# Patient Record
Sex: MALE | Race: WHITE | NOT HISPANIC OR LATINO | URBAN - METROPOLITAN AREA
[De-identification: names, ages, dates, MRNs, and addresses within clinical notes are randomized per-mention and may not be internally consistent; named-entity substitution may affect disease eponyms.]

---

## 2017-03-09 ENCOUNTER — OFFICE (AMBULATORY)
Dept: URBAN - METROPOLITAN AREA CLINIC 64 | Facility: CLINIC | Age: 62
End: 2017-03-09

## 2017-03-09 ENCOUNTER — ON CAMPUS - OUTPATIENT (AMBULATORY)
Dept: URBAN - METROPOLITAN AREA HOSPITAL 2 | Facility: HOSPITAL | Age: 62
End: 2017-03-09

## 2017-03-09 VITALS
OXYGEN SATURATION: 94 % | SYSTOLIC BLOOD PRESSURE: 108 MMHG | SYSTOLIC BLOOD PRESSURE: 115 MMHG | SYSTOLIC BLOOD PRESSURE: 131 MMHG | SYSTOLIC BLOOD PRESSURE: 113 MMHG | HEART RATE: 58 BPM | HEART RATE: 56 BPM | RESPIRATION RATE: 15 BRPM | HEART RATE: 64 BPM | DIASTOLIC BLOOD PRESSURE: 80 MMHG | TEMPERATURE: 97.2 F | SYSTOLIC BLOOD PRESSURE: 110 MMHG | DIASTOLIC BLOOD PRESSURE: 89 MMHG | DIASTOLIC BLOOD PRESSURE: 72 MMHG | DIASTOLIC BLOOD PRESSURE: 68 MMHG | RESPIRATION RATE: 16 BRPM | WEIGHT: 190 LBS | HEART RATE: 52 BPM | HEART RATE: 51 BPM | OXYGEN SATURATION: 96 % | HEART RATE: 63 BPM | OXYGEN SATURATION: 98 % | SYSTOLIC BLOOD PRESSURE: 150 MMHG | DIASTOLIC BLOOD PRESSURE: 88 MMHG | DIASTOLIC BLOOD PRESSURE: 84 MMHG | DIASTOLIC BLOOD PRESSURE: 53 MMHG | SYSTOLIC BLOOD PRESSURE: 140 MMHG | RESPIRATION RATE: 18 BRPM | OXYGEN SATURATION: 99 %

## 2017-03-09 DIAGNOSIS — Z86.010 PERSONAL HISTORY OF COLONIC POLYPS: ICD-10-CM

## 2017-03-09 DIAGNOSIS — K62.1 RECTAL POLYP: ICD-10-CM

## 2017-03-09 DIAGNOSIS — K57.30 DIVERTICULOSIS OF LARGE INTESTINE WITHOUT PERFORATION OR ABS: ICD-10-CM

## 2017-03-09 DIAGNOSIS — D12.3 BENIGN NEOPLASM OF TRANSVERSE COLON: ICD-10-CM

## 2017-03-09 DIAGNOSIS — K62.89 OTHER SPECIFIED DISEASES OF ANUS AND RECTUM: ICD-10-CM

## 2017-03-09 DIAGNOSIS — K64.8 OTHER HEMORRHOIDS: ICD-10-CM

## 2017-03-09 LAB
GI HISTOLOGY: A. UNSPECIFIED: (no result)
GI HISTOLOGY: B. UNSPECIFIED: (no result)
GI HISTOLOGY: PDF REPORT: (no result)

## 2017-03-09 PROCEDURE — 88305 TISSUE EXAM BY PATHOLOGIST: CPT | Performed by: INTERNAL MEDICINE

## 2017-03-09 PROCEDURE — 45385 COLONOSCOPY W/LESION REMOVAL: CPT | Mod: 33 | Performed by: INTERNAL MEDICINE

## 2017-03-09 RX ADMIN — PROPOFOL: 10 INJECTION, EMULSION INTRAVENOUS at 09:15

## 2017-11-02 ENCOUNTER — HOSPITAL ENCOUNTER (OUTPATIENT)
Dept: FAMILY MEDICINE CLINIC | Facility: CLINIC | Age: 62
Setting detail: SPECIMEN
Discharge: HOME | End: 2017-11-02
Attending: HOSPITALIST | Admitting: HOSPITALIST

## 2017-11-02 LAB
ALBUMIN SERPL-MCNC: 3.9 G/DL (ref 3.5–4.8)
ALBUMIN/GLOB SERPL: 1.4 {RATIO} (ref 1–1.7)
ALP SERPL-CCNC: 58 IU/L (ref 32–91)
ALT SERPL-CCNC: 27 IU/L (ref 17–63)
ANION GAP SERPL CALC-SCNC: 13.1 MMOL/L (ref 10–20)
AST SERPL-CCNC: 26 IU/L (ref 15–41)
BASOPHILS # BLD AUTO: 0 10*3/UL (ref 0–0.2)
BASOPHILS NFR BLD AUTO: 1 % (ref 0–2)
BILIRUB SERPL-MCNC: 0.8 MG/DL (ref 0.3–1.2)
BILIRUB UR QL STRIP: NEGATIVE MG/DL
BUN SERPL-MCNC: 13 MG/DL (ref 8–20)
BUN/CREAT SERPL: 10 (ref 6.2–20.3)
CALCIUM SERPL-MCNC: 9.2 MG/DL (ref 8.9–10.3)
CASTS URNS QL MICRO: NORMAL /[LPF]
CHLORIDE SERPL-SCNC: 102 MMOL/L (ref 101–111)
CHOLEST SERPL-MCNC: 201 MG/DL
CHOLEST/HDLC SERPL: 4.1 {RATIO}
COLOR UR: YELLOW
CONV BACTERIA IN URINE MICRO: NEGATIVE
CONV CLARITY OF URINE: CLEAR
CONV CO2: 26 MMOL/L (ref 22–32)
CONV HYALINE CASTS IN URINE MICRO: 0 /[LPF] (ref 0–5)
CONV LDL CHOLESTEROL DIRECT: 129 MG/DL (ref 0–100)
CONV PROTEIN IN URINE BY AUTOMATED TEST STRIP: NEGATIVE MG/DL
CONV SMALL ROUND CELLS: NORMAL /[HPF]
CONV TOTAL PROTEIN: 6.6 G/DL (ref 6.1–7.9)
CONV UROBILINOGEN IN URINE BY AUTOMATED TEST STRIP: 0.2 MG/DL
CREAT UR-MCNC: 1.3 MG/DL (ref 0.7–1.2)
CULTURE INDICATED?: NORMAL
DIFFERENTIAL METHOD BLD: (no result)
EOSINOPHIL # BLD AUTO: 0.1 10*3/UL (ref 0–0.3)
EOSINOPHIL # BLD AUTO: 2 % (ref 0–3)
ERYTHROCYTE [DISTWIDTH] IN BLOOD BY AUTOMATED COUNT: 13.5 % (ref 11.5–14.5)
FOLATE SERPL-MCNC: 12.9 NG/ML (ref 5.9–24.8)
GLOBULIN UR ELPH-MCNC: 2.7 G/DL (ref 2.5–3.8)
GLUCOSE SERPL-MCNC: 99 MG/DL (ref 65–99)
GLUCOSE UR QL: NEGATIVE MG/DL
HCT VFR BLD AUTO: 45.9 % (ref 40–54)
HDLC SERPL-MCNC: 49 MG/DL
HGB BLD-MCNC: 15.6 G/DL (ref 14–18)
HGB UR QL STRIP: NEGATIVE
KETONES UR QL STRIP: NEGATIVE MG/DL
LDLC/HDLC SERPL: 2.6 {RATIO}
LEUKOCYTE ESTERASE UR QL STRIP: NEGATIVE
LIPID INTERPRETATION: ABNORMAL
LYMPHOCYTES # BLD AUTO: 2 10*3/UL (ref 0.8–4.8)
LYMPHOCYTES NFR BLD AUTO: 37 % (ref 18–42)
MCH RBC QN AUTO: 30.2 PG (ref 26–32)
MCHC RBC AUTO-ENTMCNC: 34 G/DL (ref 32–36)
MCV RBC AUTO: 88.6 FL (ref 80–94)
MONOCYTES # BLD AUTO: 0.4 10*3/UL (ref 0.1–1.3)
MONOCYTES NFR BLD AUTO: 8 % (ref 2–11)
NEUTROPHILS # BLD AUTO: 2.9 10*3/UL (ref 2.3–8.6)
NEUTROPHILS NFR BLD AUTO: 52 % (ref 50–75)
NITRITE UR QL STRIP: NEGATIVE
NRBC BLD AUTO-RTO: 0 /100{WBCS}
NRBC/RBC NFR BLD MANUAL: 0 10*3/UL
PH UR STRIP.AUTO: 6.5 [PH] (ref 4.5–8)
PLATELET # BLD AUTO: 180 10*3/UL (ref 150–450)
PMV BLD AUTO: 8.1 FL (ref 7.4–10.4)
POTASSIUM SERPL-SCNC: 4.1 MMOL/L (ref 3.6–5.1)
PSA SERPL-MCNC: 3.38 NG/ML (ref 0–4)
RBC # BLD AUTO: 5.18 10*6/UL (ref 4.6–6)
RBC #/AREA URNS HPF: 0 /[HPF] (ref 0–3)
SODIUM SERPL-SCNC: 137 MMOL/L (ref 136–144)
SP GR UR: 1.02 (ref 1–1.03)
SPERM URNS QL MICRO: NORMAL /[HPF]
SQUAMOUS SPT QL MICRO: 0 /[HPF] (ref 0–5)
TRIGL SERPL-MCNC: 98 MG/DL
TSH SERPL-ACNC: 0.61 UIU/ML (ref 0.34–5.6)
UNIDENT CRYS URNS QL MICRO: NORMAL /[HPF]
VIT B12 SERPL-MCNC: 444 PG/ML (ref 180–914)
VLDLC SERPL CALC-MCNC: 22.6 MG/DL
WBC # BLD AUTO: 5.6 10*3/UL (ref 4.5–11.5)
WBC #/AREA URNS HPF: 1 /[HPF] (ref 0–5)
YEAST SPEC QL WET PREP: NORMAL /[HPF]

## 2017-11-11 ENCOUNTER — HOSPITAL ENCOUNTER (OUTPATIENT)
Dept: URGENT CARE | Facility: CLINIC | Age: 62
Discharge: HOME | End: 2017-11-11
Attending: FAMILY MEDICINE | Admitting: FAMILY MEDICINE

## 2018-01-05 ENCOUNTER — HOSPITAL ENCOUNTER (OUTPATIENT)
Dept: URGENT CARE | Facility: CLINIC | Age: 63
Setting detail: SPECIMEN
Discharge: HOME | End: 2018-01-05
Attending: FAMILY MEDICINE | Admitting: FAMILY MEDICINE

## 2018-01-05 LAB
C TRACH RRNA SPEC QL PROBE: NORMAL
N GONORRHOEA RRNA SPEC QL PROBE: NORMAL
SPECIMEN SOURCE: NORMAL

## 2018-01-22 ENCOUNTER — HOSPITAL ENCOUNTER (OUTPATIENT)
Dept: LAB | Facility: HOSPITAL | Age: 63
Discharge: HOME | End: 2018-01-22
Attending: OTOLARYNGOLOGY | Admitting: OTOLARYNGOLOGY

## 2018-01-22 LAB
IMMUNOCAP RESULT: NORMAL
IMMUNOCAP SPEC TYPE: NORMAL
IMMUNOCAP SPEC TYPE: NORMAL
Lab: NORMAL
Lab: NORMAL

## 2018-01-25 ENCOUNTER — HOSPITAL ENCOUNTER (OUTPATIENT)
Dept: FAMILY MEDICINE CLINIC | Facility: CLINIC | Age: 63
Setting detail: SPECIMEN
Discharge: HOME | End: 2018-01-25
Attending: HOSPITALIST | Admitting: HOSPITALIST

## 2018-02-02 ENCOUNTER — HOSPITAL ENCOUNTER (OUTPATIENT)
Dept: PREADMISSION TESTING | Facility: HOSPITAL | Age: 63
Discharge: HOME | End: 2018-02-02
Attending: UROLOGY | Admitting: UROLOGY

## 2018-02-02 LAB
ANION GAP SERPL CALC-SCNC: 11.3 MMOL/L (ref 10–20)
BASOPHILS # BLD AUTO: 0 10*3/UL (ref 0–0.2)
BASOPHILS NFR BLD AUTO: 0 % (ref 0–2)
BUN SERPL-MCNC: 14 MG/DL (ref 8–20)
BUN/CREAT SERPL: 11.7 (ref 6.2–20.3)
CALCIUM SERPL-MCNC: 9.2 MG/DL (ref 8.9–10.3)
CHLORIDE SERPL-SCNC: 102 MMOL/L (ref 101–111)
CONV CO2: 29 MMOL/L (ref 22–32)
CREAT UR-MCNC: 1.2 MG/DL (ref 0.7–1.2)
DIFFERENTIAL METHOD BLD: (no result)
EOSINOPHIL # BLD AUTO: 0.1 10*3/UL (ref 0–0.3)
EOSINOPHIL # BLD AUTO: 2 % (ref 0–3)
ERYTHROCYTE [DISTWIDTH] IN BLOOD BY AUTOMATED COUNT: 14.2 % (ref 11.5–14.5)
GLUCOSE SERPL-MCNC: 101 MG/DL (ref 65–99)
HCT VFR BLD AUTO: 47.7 % (ref 40–54)
HGB BLD-MCNC: 16 G/DL (ref 14–18)
LYMPHOCYTES # BLD AUTO: 1.6 10*3/UL (ref 0.8–4.8)
LYMPHOCYTES NFR BLD AUTO: 31 % (ref 18–42)
MCH RBC QN AUTO: 29.7 PG (ref 26–32)
MCHC RBC AUTO-ENTMCNC: 33.6 G/DL (ref 32–36)
MCV RBC AUTO: 88.4 FL (ref 80–94)
MONOCYTES # BLD AUTO: 0.3 10*3/UL (ref 0.1–1.3)
MONOCYTES NFR BLD AUTO: 7 % (ref 2–11)
NEUTROPHILS # BLD AUTO: 3 10*3/UL (ref 2.3–8.6)
NEUTROPHILS NFR BLD AUTO: 60 % (ref 50–75)
NRBC BLD AUTO-RTO: 0 /100{WBCS}
NRBC/RBC NFR BLD MANUAL: 0 10*3/UL
PLATELET # BLD AUTO: 206 10*3/UL (ref 150–450)
PMV BLD AUTO: 7.5 FL (ref 7.4–10.4)
POTASSIUM SERPL-SCNC: ABNORMAL MMOL/L (ref 3.6–5.1)
RBC # BLD AUTO: 5.4 10*6/UL (ref 4.6–6)
SODIUM SERPL-SCNC: 138 MMOL/L (ref 136–144)
WBC # BLD AUTO: 5 10*3/UL (ref 4.5–11.5)

## 2018-02-26 ENCOUNTER — HOSPITAL ENCOUNTER (OUTPATIENT)
Dept: GENERAL RADIOLOGY | Facility: HOSPITAL | Age: 63
Discharge: HOME | End: 2018-02-26
Attending: UROLOGY | Admitting: UROLOGY

## 2018-03-12 ENCOUNTER — HOSPITAL ENCOUNTER (OUTPATIENT)
Dept: ULTRASOUND IMAGING | Facility: HOSPITAL | Age: 63
Discharge: HOME | End: 2018-03-12
Attending: UROLOGY | Admitting: UROLOGY

## 2018-05-24 ENCOUNTER — HOSPITAL ENCOUNTER (OUTPATIENT)
Dept: URGENT CARE | Facility: CLINIC | Age: 63
Setting detail: SPECIMEN
Discharge: HOME | End: 2018-05-24
Attending: EMERGENCY MEDICINE | Admitting: EMERGENCY MEDICINE

## 2018-05-24 LAB
C TRACH RRNA SPEC QL PROBE: NORMAL
N GONORRHOEA RRNA SPEC QL PROBE: NORMAL
SPECIMEN SOURCE: NORMAL

## 2018-05-25 LAB
HSV1 DNA SPEC QL NAA+PROBE: NEGATIVE
SPECIMEN SOURCE: NORMAL

## 2018-07-19 ENCOUNTER — CONVERSION ENCOUNTER (OUTPATIENT)
Dept: FAMILY MEDICINE CLINIC | Facility: CLINIC | Age: 63
End: 2018-07-19

## 2018-10-08 ENCOUNTER — CONVERSION ENCOUNTER (OUTPATIENT)
Dept: FAMILY MEDICINE CLINIC | Facility: CLINIC | Age: 63
End: 2018-10-08

## 2018-10-11 ENCOUNTER — HOSPITAL ENCOUNTER (OUTPATIENT)
Dept: FAMILY MEDICINE CLINIC | Facility: CLINIC | Age: 63
Setting detail: SPECIMEN
Discharge: HOME | End: 2018-10-11
Attending: INTERNAL MEDICINE | Admitting: INTERNAL MEDICINE

## 2018-10-12 LAB — D DIMER PPP FEU-MCNC: 0.48 MG/L FEU (ref 0.17–0.59)

## 2018-12-10 ENCOUNTER — CONVERSION ENCOUNTER (OUTPATIENT)
Dept: FAMILY MEDICINE CLINIC | Facility: CLINIC | Age: 63
End: 2018-12-10

## 2019-06-03 VITALS
DIASTOLIC BLOOD PRESSURE: 80 MMHG | SYSTOLIC BLOOD PRESSURE: 146 MMHG | DIASTOLIC BLOOD PRESSURE: 88 MMHG | SYSTOLIC BLOOD PRESSURE: 128 MMHG

## 2019-06-03 VITALS — SYSTOLIC BLOOD PRESSURE: 116 MMHG | DIASTOLIC BLOOD PRESSURE: 82 MMHG

## 2019-06-10 ENCOUNTER — CONVERSION ENCOUNTER (OUTPATIENT)
Dept: FAMILY MEDICINE CLINIC | Facility: CLINIC | Age: 64
End: 2019-06-10

## 2019-06-13 VITALS — DIASTOLIC BLOOD PRESSURE: 82 MMHG | SYSTOLIC BLOOD PRESSURE: 144 MMHG

## 2019-06-16 ENCOUNTER — LAB REQUISITION (OUTPATIENT)
Dept: LAB | Facility: HOSPITAL | Age: 64
End: 2019-06-16

## 2019-06-16 DIAGNOSIS — Z86.19 PERSONAL HISTORY OF OTHER INFECTIOUS AND PARASITIC DISEASES: ICD-10-CM

## 2019-06-16 LAB — MACROSCOPIC EXAM: NORMAL

## 2019-06-16 PROCEDURE — 87169 MACROSCOPIC EXAM PARASITE: CPT | Performed by: FAMILY MEDICINE

## 2019-06-19 ENCOUNTER — TELEPHONE (OUTPATIENT)
Dept: URGENT CARE | Facility: CLINIC | Age: 64
End: 2019-06-19

## 2019-06-19 NOTE — TELEPHONE ENCOUNTER
Please let the patient know that the specimen submitted was NOT identified as a parasite.  No treatment for now.  If symptoms persist, develop, worsen, etc then additional evaluation needed.

## 2019-08-16 PROBLEM — K21.9 GASTROESOPHAGEAL REFLUX DISEASE: Status: ACTIVE | Noted: 2019-01-14

## 2019-08-16 PROBLEM — G43.909 MIGRAINE HEADACHE: Status: ACTIVE | Noted: 2019-08-16

## 2019-08-16 PROBLEM — R00.1 BRADYCARDIA: Status: ACTIVE | Noted: 2019-08-16

## 2019-08-16 PROBLEM — N40.0 ENLARGED PROSTATE: Status: ACTIVE | Noted: 2018-01-02

## 2019-08-16 PROBLEM — M54.50 LOW BACK PAIN: Status: ACTIVE | Noted: 2017-10-17

## 2019-08-16 PROBLEM — I49.9 CARDIAC ARRHYTHMIA: Status: ACTIVE | Noted: 2019-08-16

## 2019-08-16 PROBLEM — E55.9 VITAMIN D DEFICIENCY: Status: ACTIVE | Noted: 2017-11-03

## 2019-08-16 PROBLEM — N20.0 KIDNEY STONES: Status: ACTIVE | Noted: 2019-08-16

## 2019-08-16 PROBLEM — E78.5 HYPERLIPIDEMIA: Status: ACTIVE | Noted: 2019-08-16

## 2019-08-29 ENCOUNTER — OFFICE VISIT (OUTPATIENT)
Dept: FAMILY MEDICINE CLINIC | Facility: CLINIC | Age: 64
End: 2019-08-29

## 2019-08-29 VITALS
HEART RATE: 57 BPM | BODY MASS INDEX: 26.93 KG/M2 | TEMPERATURE: 97.7 F | SYSTOLIC BLOOD PRESSURE: 126 MMHG | HEIGHT: 73 IN | DIASTOLIC BLOOD PRESSURE: 72 MMHG | OXYGEN SATURATION: 97 % | RESPIRATION RATE: 16 BRPM | WEIGHT: 203.2 LBS

## 2019-08-29 DIAGNOSIS — E55.9 VITAMIN D DEFICIENCY: Primary | ICD-10-CM

## 2019-08-29 DIAGNOSIS — J40 BRONCHITIS: ICD-10-CM

## 2019-08-29 PROBLEM — J18.9 LEFT LOWER LOBE PNEUMONIA: Status: ACTIVE | Noted: 2019-08-29

## 2019-08-29 PROCEDURE — 99213 OFFICE O/P EST LOW 20 MIN: CPT | Performed by: INTERNAL MEDICINE

## 2019-08-29 RX ORDER — LEVOFLOXACIN 500 MG/1
500 TABLET, FILM COATED ORAL DAILY
Qty: 10 TABLET | Refills: 0 | Status: SHIPPED | OUTPATIENT
Start: 2019-08-29 | End: 2019-11-14

## 2019-08-29 RX ORDER — SODIUM PHOSPHATE,MONO-DIBASIC 19G-7G/118
1 ENEMA (ML) RECTAL
COMMUNITY
End: 2022-04-25

## 2019-09-05 ENCOUNTER — TELEPHONE (OUTPATIENT)
Dept: FAMILY MEDICINE CLINIC | Facility: CLINIC | Age: 64
End: 2019-09-05

## 2019-09-05 DIAGNOSIS — J18.9 PNEUMONIA OF LEFT LOWER LOBE DUE TO INFECTIOUS ORGANISM: Primary | ICD-10-CM

## 2019-09-05 NOTE — TELEPHONE ENCOUNTER
Please let him know to go to [facility] and get an chest xray- I sent to order down already

## 2019-09-05 NOTE — TELEPHONE ENCOUNTER
Patient saw you on 8/29 and you diagnosed him with walking pneumonia.  He said he is not much better and it is still hanging on.  What do you want him to do?

## 2019-09-06 ENCOUNTER — HOSPITAL ENCOUNTER (OUTPATIENT)
Dept: GENERAL RADIOLOGY | Facility: HOSPITAL | Age: 64
Discharge: HOME | End: 2019-09-06
Admitting: INTERNAL MEDICINE

## 2019-09-06 DIAGNOSIS — J18.9 PNEUMONIA OF LEFT LOWER LOBE DUE TO INFECTIOUS ORGANISM: ICD-10-CM

## 2019-09-06 PROCEDURE — 71046 X-RAY EXAM CHEST 2 VIEWS: CPT

## 2019-09-11 ENCOUNTER — TELEPHONE (OUTPATIENT)
Dept: FAMILY MEDICINE CLINIC | Facility: CLINIC | Age: 64
End: 2019-09-11

## 2019-09-12 ENCOUNTER — TELEPHONE (OUTPATIENT)
Dept: FAMILY MEDICINE CLINIC | Facility: CLINIC | Age: 64
End: 2019-09-12

## 2019-09-12 NOTE — TELEPHONE ENCOUNTER
ROGELIO CARSON, PATIENT CALLED TO THANK YOU FOR GIVING HIM RESULTS. HE APPRECIATES ALL YOUR HELP.

## 2019-12-03 ENCOUNTER — OFFICE VISIT (OUTPATIENT)
Dept: FAMILY MEDICINE CLINIC | Facility: CLINIC | Age: 64
End: 2019-12-03

## 2019-12-03 VITALS
SYSTOLIC BLOOD PRESSURE: 137 MMHG | HEIGHT: 73 IN | WEIGHT: 208 LBS | TEMPERATURE: 98.2 F | RESPIRATION RATE: 16 BRPM | OXYGEN SATURATION: 98 % | DIASTOLIC BLOOD PRESSURE: 75 MMHG | BODY MASS INDEX: 27.57 KG/M2 | HEART RATE: 53 BPM

## 2019-12-03 DIAGNOSIS — Z02.89 ENCOUNTER FOR PHYSICAL EXAMINATION RELATED TO EMPLOYMENT: Primary | ICD-10-CM

## 2019-12-03 PROCEDURE — FAA1PHY: Performed by: FAMILY MEDICINE

## 2019-12-03 NOTE — PROGRESS NOTES
Subjective   Nehemias Strong Jr. is a 64 y.o. male.     Patient is here for a 1st class flight examination.  He is employed by [employer].  Working primarly with simulators. Son flys an airbus         The following portions of the patient's history were reviewed and updated as appropriate: current medications, past family history, past medical history, past social history, past surgical history and problem list.    History reviewed. No pertinent family history.    Social History     Tobacco Use   • Smoking status: Never Smoker   • Smokeless tobacco: Never Used   Substance Use Topics   • Alcohol use: No     Frequency: Never   • Drug use: Not on file       Past Surgical History:   Procedure Laterality Date   • COLONOSCOPY     • DENTAL PROCEDURE     • HERNIA REPAIR         Patient Active Problem List   Diagnosis   • Sinus bradycardia   • Vitamin D deficiency   • Migraine headache   • Low back pain   • Kidney stones   • Idiopathic cyst of iris, ciliary body, or anterior chamber   • Hyperlipidemia   • Gastroesophageal reflux disease   • Enlarged prostate   • Allergic state   • Benign lipomatous neoplasm of skin and subcutaneous tissue   • Bradycardia   • Cardiac arrhythmia   • Left lower lobe pneumonia (CMS/HCC)   • Encounter for physical examination related to employment       Current Outpatient Medications on File Prior to Visit   Medication Sig Dispense Refill   • aspirin (aspirin EC) 81 MG EC tablet Take 81 mg by mouth Daily.     • aspirin 81 MG chewable tablet Chew.     • azithromycin (ZITHROMAX) 250 MG tablet Take 2 tablets the first day, then 1 tablet daily for 4 days. 6 tablet 0   • benzonatate (TESSALON) 200 MG capsule Take 1 capsule by mouth 3 (Three) Times a Day As Needed for Cough. 30 capsule 0   • ENGERIX-B 20 MCG/ML injection ADM 1 ML IM UTD  0   • glucosamine-chondroitin 500-400 MG capsule capsule Take 1 capsule by mouth 3 (Three) Times a Day With Meals.     • HYDROcodone-homatropine (HYCODAN) 5-1.5 MG/5ML  "syrup Take 5 mL by mouth Every 8 (Eight) Hours As Needed for Cough. 210 mL 0   • Multiple Vitamin (MULTIVITAMIN) capsule MULTIVITAMINS CAPS     • Multiple Vitamin (MULTIVITAMIN) capsule Take 1 capsule by mouth Daily.     • pantoprazole (PROTONIX) 40 MG EC tablet take 1 tablet by mouth once daily     • pantoprazole (PROTONIX) 40 MG EC tablet   0   • Triamcinolone Acetonide (NASACORT) 55 MCG/ACT nasal inhaler   0     No current facility-administered medications on file prior to visit.        No Known Allergies    Review of Systems   All other systems reviewed and are negative.      Objective   Visit Vitals  /75 (BP Location: Left arm, Patient Position: Sitting, Cuff Size: Large Adult)   Pulse 53   Temp 98.2 °F (36.8 °C)   Resp 16   Ht 185.4 cm (73\")   Wt 94.3 kg (208 lb)   SpO2 98%   BMI 27.44 kg/m²     Physical Exam   Constitutional: He is oriented to person, place, and time. He appears well-developed and well-nourished. No distress.   HENT:   Head: Normocephalic and atraumatic.   Right Ear: External ear normal.   Left Ear: External ear normal.   Mouth/Throat: Oropharynx is clear and moist. No oropharyngeal exudate.   Eyes: Conjunctivae and EOM are normal. Pupils are equal, round, and reactive to light. Right eye exhibits no discharge. Left eye exhibits no discharge.   Neck: Normal range of motion. Neck supple. No thyromegaly present.   Cardiovascular: Normal rate, regular rhythm, normal heart sounds and intact distal pulses. Exam reveals no gallop and no friction rub.   No murmur heard.  Pulmonary/Chest: Effort normal and breath sounds normal. No respiratory distress. He has no wheezes. He exhibits no tenderness.   Abdominal: Soft. Bowel sounds are normal. He exhibits no distension. There is no tenderness. There is no rebound and no guarding. No hernia.   Musculoskeletal: Normal range of motion. He exhibits no edema, tenderness or deformity.   Lymphadenopathy:     He has no cervical adenopathy.        Right " cervical: No superficial cervical adenopathy present.       Left cervical: No superficial cervical adenopathy present.   Neurological: He is alert and oriented to person, place, and time. He displays normal reflexes. No cranial nerve deficit or sensory deficit. He exhibits normal muscle tone. Coordination normal.   Skin: Skin is warm and dry. No rash noted. He is not diaphoretic. No erythema.   Psychiatric: He has a normal mood and affect. His behavior is normal. Judgment and thought content normal.         Assessment/Plan .  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 UA done today was normal.  Vision 20/20 bilaterally-corrected.

## 2019-12-03 NOTE — ASSESSMENT & PLAN NOTE
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 UA done today was normal.  Vision 20/20 bilaterally-corrected.

## 2020-01-21 ENCOUNTER — OFFICE VISIT (OUTPATIENT)
Dept: FAMILY MEDICINE CLINIC | Facility: CLINIC | Age: 65
End: 2020-01-21

## 2020-01-21 VITALS
TEMPERATURE: 98 F | WEIGHT: 211 LBS | RESPIRATION RATE: 16 BRPM | HEART RATE: 56 BPM | DIASTOLIC BLOOD PRESSURE: 76 MMHG | BODY MASS INDEX: 27.96 KG/M2 | SYSTOLIC BLOOD PRESSURE: 118 MMHG | HEIGHT: 73 IN

## 2020-01-21 DIAGNOSIS — J18.9 PNEUMONIA OF LEFT LOWER LOBE DUE TO INFECTIOUS ORGANISM: Primary | ICD-10-CM

## 2020-01-21 PROBLEM — Z11.8 SPECIAL SCREENING EXAMINATION FOR OTHER SPECIFIED CHLAMYDIAL DISEASES: Status: ACTIVE | Noted: 2018-01-05

## 2020-01-21 PROBLEM — E66.3 OVERWEIGHT (BMI 25.0-29.9): Status: ACTIVE | Noted: 2020-01-21

## 2020-01-21 PROBLEM — R31.9 HEMATURIA: Status: ACTIVE | Noted: 2018-01-02

## 2020-01-21 PROBLEM — B37.0 THRUSH, ORAL: Status: ACTIVE | Noted: 2017-10-17

## 2020-01-21 PROBLEM — R05.9 COUGH: Status: ACTIVE | Noted: 2018-01-05

## 2020-01-21 PROBLEM — K62.3 RECTAL PROLAPSE: Status: ACTIVE | Noted: 2018-05-01

## 2020-01-21 PROBLEM — S81.809A UNSPECIFIED OPEN WOUND, UNSPECIFIED LOWER LEG, INITIAL ENCOUNTER: Status: ACTIVE | Noted: 2018-07-15

## 2020-01-21 PROBLEM — R63.6 UNDERWEIGHT: Status: ACTIVE | Noted: 2020-01-21

## 2020-01-21 PROBLEM — N48.9 PENILE LESION: Status: ACTIVE | Noted: 2018-05-24

## 2020-01-21 PROBLEM — M72.2 PLANTAR FASCIITIS: Status: ACTIVE | Noted: 2020-01-21

## 2020-01-21 PROBLEM — J02.9 ACUTE PHARYNGITIS: Status: ACTIVE | Noted: 2017-06-08

## 2020-01-21 PROBLEM — R11.2 NAUSEA AND VOMITING: Status: ACTIVE | Noted: 2017-10-21

## 2020-01-21 PROBLEM — M79.609 PAIN IN EXTREMITY: Status: ACTIVE | Noted: 2018-10-11

## 2020-01-21 PROBLEM — Z12.5 SCREENING FOR PROSTATE CANCER: Status: ACTIVE | Noted: 2017-09-29

## 2020-01-21 PROBLEM — S61.032A: Status: ACTIVE | Noted: 2018-05-08

## 2020-01-21 PROBLEM — L56.8 PHOTODERMATITIS: Status: ACTIVE | Noted: 2018-08-12

## 2020-01-21 PROBLEM — R13.10 ODYNOPHAGIA: Status: ACTIVE | Noted: 2018-05-24

## 2020-01-21 PROBLEM — Z23 NEED FOR OTHER PROPHYLACTIC VACCINATION AND INOCULATION AGAINST SINGLE DISEASES: Status: ACTIVE | Noted: 2018-05-01

## 2020-01-21 PROBLEM — Z72.51 HIGH RISK SEXUAL BEHAVIOR: Status: ACTIVE | Noted: 2018-05-24

## 2020-01-21 PROBLEM — Z80.1 FAMILY HISTORY OF LUNG CANCER: Status: ACTIVE | Noted: 2020-01-21

## 2020-01-21 PROCEDURE — 99213 OFFICE O/P EST LOW 20 MIN: CPT | Performed by: FAMILY MEDICINE

## 2020-01-21 RX ORDER — FAMOTIDINE 40 MG/1
40 TABLET, FILM COATED ORAL
COMMUNITY
Start: 2020-01-13 | End: 2021-10-11

## 2020-01-21 RX ORDER — DIPHENHYDRAMINE HCL 25 MG/1
2 TABLET, CHEWABLE ORAL DAILY
COMMUNITY
Start: 2020-01-13 | End: 2022-09-30 | Stop reason: HOSPADM

## 2020-01-21 RX ORDER — AZITHROMYCIN 500 MG/1
500 TABLET, FILM COATED ORAL DAILY
Qty: 3 TABLET | Refills: 0 | Status: SHIPPED | OUTPATIENT
Start: 2020-01-21 | End: 2020-03-20

## 2020-01-21 RX ORDER — IBUPROFEN 800 MG/1
800 TABLET ORAL EVERY 8 HOURS PRN
Qty: 30 TABLET | Refills: 1 | Status: SHIPPED | OUTPATIENT
Start: 2020-01-21 | End: 2020-03-20

## 2020-01-21 RX ORDER — GUAIFENESIN, PSEUDOEPHEDRINE HYDROCHLORIDE 600; 60 MG/1; MG/1
1 TABLET, EXTENDED RELEASE ORAL EVERY 12 HOURS
Qty: 20 TABLET | Refills: 0 | Status: SHIPPED | OUTPATIENT
Start: 2020-01-21 | End: 2020-03-20

## 2020-01-21 RX ORDER — BENZONATATE 100 MG/1
200 CAPSULE ORAL 3 TIMES DAILY PRN
Qty: 40 CAPSULE | Refills: 1 | Status: SHIPPED | OUTPATIENT
Start: 2020-01-21 | End: 2020-03-20

## 2020-01-21 NOTE — PROGRESS NOTES
Chief Complaint   Patient presents with   • Cough     HPI  Nehemias Strong Jr. is a 64 y.o. male that presents for cough.    Cough and congestion began 2 weeks ago. Was seen at urgent care on 1/9/20 for sinus pain, congestion, SOB. He was and given abx (Augmentin), steroid, and Mucinex DM. Medications are helping. No fever. Just concerned about lingering cough.     Review of Systems   Constitutional: Negative for chills and fever.   HENT: Positive for congestion. Negative for ear pain and sore throat.    Eyes: Negative for visual disturbance.   Respiratory: Positive for cough. Negative for shortness of breath.    Cardiovascular: Negative for chest pain.   Gastrointestinal: Negative for abdominal pain.   Skin: Negative for rash.     The following portions of the patient's history were reviewed and updated as appropriate: problem list, past medical history, past surgical history, allergies, current medications    Problem List Tab  Patient History Tab  Immunizations Tab  Medications Tab  Chart Review Tab  Care Everywhere Tab  Synopsis Tab    PE  Vitals:    01/21/20 1023   BP: 118/76   Pulse: 56   Resp: 16   Temp: 98 °F (36.7 °C)     Body mass index is 27.84 kg/m².  General: Well nourished, NAD  Head: AT/NC  Eyes: EOMI, anicteric sclera  ENT: MMM w/o erythema  Neck: Supple, no thyromegaly or LAD  Resp: LLL crackles on exam today. SCR, BS equal  CV: RRR w/o m/r/g; 2+ pulses  GI: Soft, NT/ND, +BS  MSK: FROM, no deformity, no edema  Skin: Warm, dry, intact  Neuro: Alert and oriented. No focal deficits  Psych: Appropriate mood and affect    Imaging  No Images in the past 120 days found..    Assessment/Plan   Nehemias Strong Jr. is a 64 y.o. male that presents for   Chief Complaint   Patient presents with   • Cough     Nehemias was seen today for cough.    Diagnoses and all orders for this visit:    Pneumonia of left lower lobe due to infectious organism (CMS/HCC): I am concerned that patient's illness may represent  left lower lobe pneumonia.  He was given Augmentin at urgent care and just finished 10-day course a day or 2 ago.  Given symptoms of persistent congestion and cough with left lower lobes on exam, I will complete course of community-acquired pneumonia with azithromycin as below.  We will also give symptomatic treatment with medications below.  -     pseudoephedrine-guaifenesin (MUCINEX D)  MG per 12 hr tablet; Take 1 tablet by mouth Every 12 (Twelve) Hours.  -     ibuprofen (ADVIL,MOTRIN) 800 MG tablet; Take 1 tablet by mouth Every 8 (Eight) Hours As Needed for Mild Pain  or Moderate Pain .  -     azithromycin (ZITHROMAX) 500 MG tablet; Take 1 tablet by mouth Daily.  -     benzonatate (TESSALON) 100 MG capsule; Take 2 capsules by mouth 3 (Three) Times a Day As Needed for Cough.  -Counseled regarding complete resolution of cough over the next few weeks.

## 2020-02-24 ENCOUNTER — OFFICE VISIT (OUTPATIENT)
Dept: FAMILY MEDICINE CLINIC | Facility: CLINIC | Age: 65
End: 2020-02-24

## 2020-02-24 ENCOUNTER — HOSPITAL ENCOUNTER (OUTPATIENT)
Dept: CT IMAGING | Facility: HOSPITAL | Age: 65
Discharge: HOME | End: 2020-02-24
Admitting: NURSE PRACTITIONER

## 2020-02-24 VITALS
WEIGHT: 213 LBS | TEMPERATURE: 97.8 F | HEIGHT: 73 IN | HEART RATE: 59 BPM | RESPIRATION RATE: 8 BRPM | OXYGEN SATURATION: 98 % | DIASTOLIC BLOOD PRESSURE: 80 MMHG | BODY MASS INDEX: 28.23 KG/M2 | SYSTOLIC BLOOD PRESSURE: 108 MMHG

## 2020-02-24 DIAGNOSIS — R04.2 HEMOPTYSIS: Primary | ICD-10-CM

## 2020-02-24 DIAGNOSIS — R04.2 HEMOPTYSIS: ICD-10-CM

## 2020-02-24 DIAGNOSIS — J18.9 PNEUMONIA OF LEFT LOWER LOBE DUE TO INFECTIOUS ORGANISM: ICD-10-CM

## 2020-02-24 DIAGNOSIS — R09.82 PND (POST-NASAL DRIP): ICD-10-CM

## 2020-02-24 PROBLEM — B37.0 THRUSH, ORAL: Status: RESOLVED | Noted: 2017-10-17 | Resolved: 2020-02-24

## 2020-02-24 PROBLEM — Z11.8 SPECIAL SCREENING EXAMINATION FOR OTHER SPECIFIED CHLAMYDIAL DISEASES: Status: RESOLVED | Noted: 2018-01-05 | Resolved: 2020-02-24

## 2020-02-24 PROBLEM — N48.9 PENILE LESION: Status: RESOLVED | Noted: 2018-05-24 | Resolved: 2020-02-24

## 2020-02-24 PROBLEM — Z02.89 ENCOUNTER FOR PHYSICAL EXAMINATION RELATED TO EMPLOYMENT: Status: RESOLVED | Noted: 2019-12-03 | Resolved: 2020-02-24

## 2020-02-24 PROBLEM — R63.6 UNDERWEIGHT: Status: RESOLVED | Noted: 2020-01-21 | Resolved: 2020-02-24

## 2020-02-24 PROBLEM — M79.609 PAIN IN EXTREMITY: Status: RESOLVED | Noted: 2018-10-11 | Resolved: 2020-02-24

## 2020-02-24 PROBLEM — Z72.51 HIGH RISK SEXUAL BEHAVIOR: Status: RESOLVED | Noted: 2018-05-24 | Resolved: 2020-02-24

## 2020-02-24 PROBLEM — M54.50 LOW BACK PAIN: Status: RESOLVED | Noted: 2017-10-17 | Resolved: 2020-02-24

## 2020-02-24 PROBLEM — R05.9 COUGH: Status: RESOLVED | Noted: 2018-01-05 | Resolved: 2020-02-24

## 2020-02-24 PROBLEM — J02.9 ACUTE PHARYNGITIS: Status: RESOLVED | Noted: 2017-06-08 | Resolved: 2020-02-24

## 2020-02-24 PROBLEM — S61.032A: Status: RESOLVED | Noted: 2018-05-08 | Resolved: 2020-02-24

## 2020-02-24 PROBLEM — Z12.5 SCREENING FOR PROSTATE CANCER: Status: RESOLVED | Noted: 2017-09-29 | Resolved: 2020-02-24

## 2020-02-24 PROBLEM — R31.9 HEMATURIA: Status: RESOLVED | Noted: 2018-01-02 | Resolved: 2020-02-24

## 2020-02-24 PROBLEM — L56.8 PHOTODERMATITIS: Status: RESOLVED | Noted: 2018-08-12 | Resolved: 2020-02-24

## 2020-02-24 PROBLEM — R11.2 NAUSEA AND VOMITING: Status: RESOLVED | Noted: 2017-10-21 | Resolved: 2020-02-24

## 2020-02-24 PROBLEM — E66.3 OVERWEIGHT (BMI 25.0-29.9): Status: RESOLVED | Noted: 2020-01-21 | Resolved: 2020-02-24

## 2020-02-24 LAB — CREAT BLDA-MCNC: 1.3 MG/DL (ref 0.6–1.3)

## 2020-02-24 PROCEDURE — 99214 OFFICE O/P EST MOD 30 MIN: CPT | Performed by: NURSE PRACTITIONER

## 2020-02-24 PROCEDURE — 0 IOPAMIDOL PER 1 ML: Performed by: NURSE PRACTITIONER

## 2020-02-24 PROCEDURE — 82565 ASSAY OF CREATININE: CPT

## 2020-02-24 PROCEDURE — 71260 CT THORAX DX C+: CPT

## 2020-02-24 RX ADMIN — IOPAMIDOL 100 ML: 755 INJECTION, SOLUTION INTRAVENOUS at 10:00

## 2020-02-24 NOTE — PROGRESS NOTES
"Subjective   Nehemias Strong Jr. is a 64 y.o. male.     Chief Complaint   Patient presents with   • Follow-up     Pneumonia       /80 (BP Location: Left arm, Patient Position: Sitting, Cuff Size: Adult)   Pulse 59   Temp 97.8 °F (36.6 °C) (Oral)   Resp 8   Ht 185.4 cm (73\")   Wt 96.6 kg (213 lb)   SpO2 98%   BMI 28.10 kg/m²     BP Readings from Last 3 Encounters:   02/24/20 108/80   01/21/20 118/76   01/09/20 131/82       Wt Readings from Last 3 Encounters:   02/24/20 96.6 kg (213 lb)   01/21/20 95.7 kg (211 lb)   01/09/20 97.1 kg (214 lb)       Pt comes in today with c/o raspy cough, sore throat, hemoptysis. Bloody sputum one time on 2/18. Hasn't happened since.   Was seen around 1/9 and given augmentin for URI. Was seen on 1/21 and was given z-pack for poss LLL PNA.   Cough is improved, but still feels like he is constantly clearing throat and has a raspy cough.   Has taken mucinex in the past.     Had 2nd shingrix injection on Feb 15.    Pt also mentions that he was in [place] in Nov. Pt is a  for [employer].        The following portions of the patient's history were reviewed and updated as appropriate: allergies, current medications, past family history, past medical history, past social history, past surgical history and problem list.    Review of Systems   Constitutional: Negative for chills and fever.   HENT: Positive for congestion, postnasal drip and sore throat. Negative for ear pain, rhinorrhea, sinus pressure, sneezing and swollen glands.    Respiratory: Positive for cough and shortness of breath. Negative for chest tightness and wheezing.    Gastrointestinal: Negative for nausea and vomiting.   Neurological: Negative for headache.       Objective   Physical Exam   Constitutional: He is oriented to person, place, and time. He appears well-developed and well-nourished.   HENT:   Head: Normocephalic.   Right Ear: External ear normal.   Left Ear: External ear normal.   +PND    Eyes: Pupils are " equal, round, and reactive to light.   Cardiovascular: Normal rate and regular rhythm.   Pulmonary/Chest: Effort normal and breath sounds normal. No respiratory distress. He has no wheezes. He has no rhonchi.   Neurological: He is alert and oriented to person, place, and time.         Diagnoses and all orders for this visit:    1. Hemoptysis (Primary)  -     Cancel: CT Chest With & Without Contrast; Future  -     CT Chest With & Without Contrast; Future    2. Pneumonia of left lower lobe due to infectious organism (CMS/HCC)    3. PND (post-nasal drip)    Will get STAT CT chest  Start otc meds for PND  During this office visit, we discussed the pertinent aspects of the visit and treatment recommendations. Pt verbalizes understanding. Follow up was discussed. Patient was given the opportunity to ask questions and discuss other concerns.       Return if symptoms worsen or fail to improve.  Answers for HPI/ROS submitted by the patient on 2/22/2020   What is the primary reason for your visit?: Other  Please describe your symptoms.: Tuesday morning I coughed up some blood, some fresh looking and some dark and clotted in nature.  I did get my second Shingrix booster on Saturday February 15th.  I didn't know if there may have been a connection but thought it prudent to be seen by a medical professional to rule out any pulmonary issues.  Have you had these symptoms before?: Yes  How long have you been having these symptoms?: past few days  Please list any medications you are currently taking for this condition.: none

## 2020-03-13 ENCOUNTER — OFFICE (AMBULATORY)
Dept: URBAN - METROPOLITAN AREA PATHOLOGY 4 | Facility: PATHOLOGY | Age: 65
End: 2020-03-13

## 2020-03-13 ENCOUNTER — ON CAMPUS - OUTPATIENT (AMBULATORY)
Dept: URBAN - METROPOLITAN AREA HOSPITAL 2 | Facility: HOSPITAL | Age: 65
End: 2020-03-13

## 2020-03-13 VITALS
RESPIRATION RATE: 15 BRPM | RESPIRATION RATE: 17 BRPM | HEART RATE: 69 BPM | HEART RATE: 73 BPM | DIASTOLIC BLOOD PRESSURE: 97 MMHG | TEMPERATURE: 98.5 F | DIASTOLIC BLOOD PRESSURE: 83 MMHG | HEART RATE: 71 BPM | HEART RATE: 60 BPM | SYSTOLIC BLOOD PRESSURE: 123 MMHG | HEART RATE: 62 BPM | SYSTOLIC BLOOD PRESSURE: 121 MMHG | OXYGEN SATURATION: 94 % | OXYGEN SATURATION: 98 % | SYSTOLIC BLOOD PRESSURE: 141 MMHG | SYSTOLIC BLOOD PRESSURE: 131 MMHG | SYSTOLIC BLOOD PRESSURE: 116 MMHG | OXYGEN SATURATION: 97 % | DIASTOLIC BLOOD PRESSURE: 82 MMHG | SYSTOLIC BLOOD PRESSURE: 143 MMHG | RESPIRATION RATE: 18 BRPM | HEART RATE: 65 BPM | DIASTOLIC BLOOD PRESSURE: 77 MMHG | WEIGHT: 211 LBS | DIASTOLIC BLOOD PRESSURE: 76 MMHG | OXYGEN SATURATION: 96 % | HEART RATE: 70 BPM | DIASTOLIC BLOOD PRESSURE: 73 MMHG | RESPIRATION RATE: 16 BRPM

## 2020-03-13 DIAGNOSIS — K57.30 DIVERTICULOSIS OF LARGE INTESTINE WITHOUT PERFORATION OR ABS: ICD-10-CM

## 2020-03-13 DIAGNOSIS — Z86.010 PERSONAL HISTORY OF COLONIC POLYPS: ICD-10-CM

## 2020-03-13 DIAGNOSIS — K64.8 OTHER HEMORRHOIDS: ICD-10-CM

## 2020-03-13 DIAGNOSIS — D12.2 BENIGN NEOPLASM OF ASCENDING COLON: ICD-10-CM

## 2020-03-13 DIAGNOSIS — K62.1 RECTAL POLYP: ICD-10-CM

## 2020-03-13 PROBLEM — K63.5 POLYP OF COLON: Status: ACTIVE | Noted: 2020-03-13

## 2020-03-13 PROCEDURE — 45380 COLONOSCOPY AND BIOPSY: CPT | Mod: 33 | Performed by: INTERNAL MEDICINE

## 2020-03-13 PROCEDURE — 88305 TISSUE EXAM BY PATHOLOGIST: CPT | Mod: 33 | Performed by: INTERNAL MEDICINE

## 2020-03-20 RX ORDER — BENZONATATE 100 MG/1
200 CAPSULE ORAL 3 TIMES DAILY PRN
Qty: 40 CAPSULE | Refills: 1 | Status: SHIPPED | OUTPATIENT
Start: 2020-03-20 | End: 2020-10-23

## 2020-03-20 RX ORDER — GUAIFENESIN, PSEUDOEPHEDRINE HYDROCHLORIDE 600; 60 MG/1; MG/1
1 TABLET, EXTENDED RELEASE ORAL EVERY 12 HOURS
Qty: 20 TABLET | Refills: 0 | Status: SHIPPED | OUTPATIENT
Start: 2020-03-20 | End: 2020-04-21

## 2020-03-20 RX ORDER — IBUPROFEN 800 MG/1
800 TABLET ORAL EVERY 8 HOURS PRN
Qty: 30 TABLET | Refills: 1 | Status: SHIPPED | OUTPATIENT
Start: 2020-03-20 | End: 2020-04-21

## 2020-03-20 RX ORDER — AZITHROMYCIN 500 MG/1
500 TABLET, FILM COATED ORAL DAILY
Qty: 3 TABLET | Refills: 0 | Status: SHIPPED | OUTPATIENT
Start: 2020-03-20 | End: 2020-04-21

## 2020-04-21 ENCOUNTER — OFFICE VISIT (OUTPATIENT)
Dept: FAMILY MEDICINE CLINIC | Facility: CLINIC | Age: 65
End: 2020-04-21

## 2020-04-21 VITALS
TEMPERATURE: 97.7 F | WEIGHT: 210 LBS | HEIGHT: 73 IN | RESPIRATION RATE: 16 BRPM | BODY MASS INDEX: 27.83 KG/M2 | DIASTOLIC BLOOD PRESSURE: 98 MMHG | SYSTOLIC BLOOD PRESSURE: 144 MMHG | HEART RATE: 63 BPM | OXYGEN SATURATION: 98 %

## 2020-04-21 DIAGNOSIS — E78.5 HYPERLIPIDEMIA, UNSPECIFIED HYPERLIPIDEMIA TYPE: ICD-10-CM

## 2020-04-21 DIAGNOSIS — Z00.00 ANNUAL VISIT FOR GENERAL ADULT MEDICAL EXAMINATION WITHOUT ABNORMAL FINDINGS: Primary | ICD-10-CM

## 2020-04-21 DIAGNOSIS — E04.1 THYROID NODULE: ICD-10-CM

## 2020-04-21 DIAGNOSIS — R00.1 BRADYCARDIA: ICD-10-CM

## 2020-04-21 DIAGNOSIS — K21.9 GASTROESOPHAGEAL REFLUX DISEASE, ESOPHAGITIS PRESENCE NOT SPECIFIED: ICD-10-CM

## 2020-04-21 DIAGNOSIS — I10 ESSENTIAL HYPERTENSION: ICD-10-CM

## 2020-04-21 DIAGNOSIS — Z12.5 SCREENING PSA (PROSTATE SPECIFIC ANTIGEN): ICD-10-CM

## 2020-04-21 LAB
25(OH)D3 SERPL-MCNC: 26.6 NG/ML (ref 30–100)
ALBUMIN SERPL-MCNC: 4.5 G/DL (ref 3.5–5.2)
ALBUMIN/GLOB SERPL: 1.7 G/DL
ALP SERPL-CCNC: 65 U/L (ref 39–117)
ALT SERPL W P-5'-P-CCNC: 32 U/L (ref 1–41)
ANION GAP SERPL CALCULATED.3IONS-SCNC: 9.8 MMOL/L (ref 5–15)
AST SERPL-CCNC: 25 U/L (ref 1–40)
BASOPHILS # BLD AUTO: 0.04 10*3/MM3 (ref 0–0.2)
BASOPHILS NFR BLD AUTO: 0.8 % (ref 0–1.5)
BILIRUB SERPL-MCNC: 0.7 MG/DL (ref 0.2–1.2)
BUN BLD-MCNC: 16 MG/DL (ref 8–23)
BUN/CREAT SERPL: 12.6 (ref 7–25)
CALCIUM SPEC-SCNC: 9.5 MG/DL (ref 8.6–10.5)
CHLORIDE SERPL-SCNC: 99 MMOL/L (ref 98–107)
CHOLEST SERPL-MCNC: 197 MG/DL (ref 0–200)
CO2 SERPL-SCNC: 28.2 MMOL/L (ref 22–29)
CREAT BLD-MCNC: 1.27 MG/DL (ref 0.76–1.27)
DEPRECATED RDW RBC AUTO: 40.1 FL (ref 37–54)
EOSINOPHIL # BLD AUTO: 0.08 10*3/MM3 (ref 0–0.4)
EOSINOPHIL NFR BLD AUTO: 1.6 % (ref 0.3–6.2)
ERYTHROCYTE [DISTWIDTH] IN BLOOD BY AUTOMATED COUNT: 12.6 % (ref 12.3–15.4)
GFR SERPL CREATININE-BSD FRML MDRD: 57 ML/MIN/1.73
GLOBULIN UR ELPH-MCNC: 2.7 GM/DL
GLUCOSE BLD-MCNC: 105 MG/DL (ref 65–99)
HCT VFR BLD AUTO: 47.5 % (ref 37.5–51)
HDLC SERPL-MCNC: 47 MG/DL (ref 40–60)
HGB BLD-MCNC: 16.1 G/DL (ref 13–17.7)
IMM GRANULOCYTES # BLD AUTO: 0.01 10*3/MM3 (ref 0–0.05)
IMM GRANULOCYTES NFR BLD AUTO: 0.2 % (ref 0–0.5)
LDLC SERPL CALC-MCNC: 128 MG/DL (ref 0–100)
LDLC/HDLC SERPL: 2.72 {RATIO}
LYMPHOCYTES # BLD AUTO: 2.03 10*3/MM3 (ref 0.7–3.1)
LYMPHOCYTES NFR BLD AUTO: 40.2 % (ref 19.6–45.3)
MCH RBC QN AUTO: 29.8 PG (ref 26.6–33)
MCHC RBC AUTO-ENTMCNC: 33.9 G/DL (ref 31.5–35.7)
MCV RBC AUTO: 88 FL (ref 79–97)
MONOCYTES # BLD AUTO: 0.42 10*3/MM3 (ref 0.1–0.9)
MONOCYTES NFR BLD AUTO: 8.3 % (ref 5–12)
NEUTROPHILS # BLD AUTO: 2.47 10*3/MM3 (ref 1.7–7)
NEUTROPHILS NFR BLD AUTO: 48.9 % (ref 42.7–76)
NRBC BLD AUTO-RTO: 0 /100 WBC (ref 0–0.2)
PLATELET # BLD AUTO: 185 10*3/MM3 (ref 140–450)
PMV BLD AUTO: 9.7 FL (ref 6–12)
POTASSIUM BLD-SCNC: 4.2 MMOL/L (ref 3.5–5.2)
PROT SERPL-MCNC: 7.2 G/DL (ref 6–8.5)
PSA SERPL-MCNC: 0.78 NG/ML (ref 0–4)
RBC # BLD AUTO: 5.4 10*6/MM3 (ref 4.14–5.8)
SODIUM BLD-SCNC: 137 MMOL/L (ref 136–145)
T4 FREE SERPL-MCNC: 1 NG/DL (ref 0.93–1.7)
TRIGL SERPL-MCNC: 111 MG/DL (ref 0–150)
TSH SERPL DL<=0.05 MIU/L-ACNC: 1.17 UIU/ML (ref 0.27–4.2)
VIT B12 BLD-MCNC: 634 PG/ML (ref 211–946)
VLDLC SERPL-MCNC: 22.2 MG/DL (ref 5–40)
WBC NRBC COR # BLD: 5.05 10*3/MM3 (ref 3.4–10.8)

## 2020-04-21 PROCEDURE — 82607 VITAMIN B-12: CPT | Performed by: FAMILY MEDICINE

## 2020-04-21 PROCEDURE — 85025 COMPLETE CBC W/AUTO DIFF WBC: CPT | Performed by: FAMILY MEDICINE

## 2020-04-21 PROCEDURE — 99396 PREV VISIT EST AGE 40-64: CPT | Performed by: FAMILY MEDICINE

## 2020-04-21 PROCEDURE — 80053 COMPREHEN METABOLIC PANEL: CPT | Performed by: FAMILY MEDICINE

## 2020-04-21 PROCEDURE — 80061 LIPID PANEL: CPT | Performed by: FAMILY MEDICINE

## 2020-04-21 PROCEDURE — 84443 ASSAY THYROID STIM HORMONE: CPT | Performed by: FAMILY MEDICINE

## 2020-04-21 PROCEDURE — 84439 ASSAY OF FREE THYROXINE: CPT | Performed by: FAMILY MEDICINE

## 2020-04-21 PROCEDURE — 36415 COLL VENOUS BLD VENIPUNCTURE: CPT | Performed by: FAMILY MEDICINE

## 2020-04-21 PROCEDURE — 83036 HEMOGLOBIN GLYCOSYLATED A1C: CPT | Performed by: FAMILY MEDICINE

## 2020-04-21 PROCEDURE — 99213 OFFICE O/P EST LOW 20 MIN: CPT | Performed by: FAMILY MEDICINE

## 2020-04-21 PROCEDURE — G0103 PSA SCREENING: HCPCS | Performed by: FAMILY MEDICINE

## 2020-04-21 PROCEDURE — 82306 VITAMIN D 25 HYDROXY: CPT | Performed by: FAMILY MEDICINE

## 2020-04-21 RX ORDER — LISINOPRIL 10 MG/1
10 TABLET ORAL DAILY
Qty: 90 TABLET | Refills: 1 | Status: SHIPPED | OUTPATIENT
Start: 2020-04-21 | End: 2020-09-28

## 2020-04-21 NOTE — PROGRESS NOTES
Chief Complaint   Patient presents with   • Annual Exam     HPI  Nehemias Strong Jr. is a 64 y.o. male that presents for annual exam.    GERD: taking Pepcid 40mg daily. NO symptoms of reflux/heartburn, chronic cough when taking medicine. No dysphagia    Thyroid nodule: Incidental finding and work-up of hemoptysis-as seen on 2/24 CT chest. R-sided, 1.7cm in size.  Asymptomatic, no complaints    HLD: not maintained on any medication    BP: no hx HTN but /98 today. Not taking any medications    Arrhythmia: hx of sinus bradycardia. Follows w/ TANJA Smith at [place]. 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    Review of Systems   Constitutional: Negative for chills, fever and unexpected weight loss.   HENT: Negative for congestion, rhinorrhea and sore throat.    Eyes: Negative for visual disturbance.   Respiratory: Negative for cough and shortness of breath.    Cardiovascular: Negative for chest pain and palpitations.   Gastrointestinal: Negative for abdominal pain, constipation, diarrhea, nausea and vomiting.   Genitourinary: Negative for difficulty urinating and dysuria.   Musculoskeletal: Negative for arthralgias and joint swelling.   Skin: Negative for rash and skin lesions.   Neurological: Negative for weakness and headache.   Psychiatric/Behavioral: Negative for depressed mood. The patient is not nervous/anxious.      The following portions of the patient's history were reviewed and updated as appropriate: problem list, past medical history, past surgical history, allergies, current medications, past social history and past family history.    Problem List Tab  Patient History Tab  Immunizations Tab  Medications Tab  Chart Review Tab  Care Everywhere Tab  Synopsis Tab    PE  Vitals:    04/21/20 0901   BP: 144/98   Pulse: 63   Resp: 16   Temp: 97.7 °F (36.5 °C)   SpO2: 98%     Body mass index is 27.71 kg/m².  General: Well nourished, NAD  Head: AT/NC  Eyes: EOMI, anicteric sclera  ENT: MMM w/o  erythema  Neck: Supple, no thyromegaly or LAD.  No thyroid nodule appreciated.  No carotid bruit  Resp: CTAB, SCR, BS equal  CV: RRR w/o m/r/g; 2+ pulses  GI: Soft, NT/ND, +BS  MSK: FROM, no deformity, no edema  Skin: Warm, dry, intact  Neuro: Alert and oriented. No focal deficits  Psych: Appropriate mood and affect    Imaging  Ct Chest With Contrast    Result Date: 2/24/2020   1. Probable bibasilar subsegmental atelectasis. 2. Additional findings as given above.    Electronically Signed By-Beau Jacobo On:2/24/2020 10:12 AM This report was finalized on [id number] by  Beau Jacobo, .      Assessment/Plan   Nehemias Strong Jr. is a 64 y.o. male that presents for   Chief Complaint   Patient presents with   • Annual Exam     Nehemias was seen today for annual exam.    Diagnoses and all orders for this visit:    Annual visit for general adult medical examination without abnormal findings  -     CBC & Differential  -     Comprehensive Metabolic Panel  -     Hemoglobin A1c  -     Lipid Panel  -     PSA Screen  -     T4, Free  -     TSH  -     Vitamin D 25 Hydroxy  -     Vitamin B12  -     CBC Auto Differential  - Counseled regarding diet, exercise, weight loss, and preventative health maintenance items/immunizations below    Gastroesophageal reflux disease, esophagitis presence not specified   -Continue home Pepcid 40 mg daily    Thyroid nodule: Incidental finding on 2/24/2020 CT chest.  Poorly defined on that study and will obtain thyroid ultrasound to better characterize.  -     T4, Free  -     TSH  -     US Thyroid; Future    Hyperlipidemia, unspecified hyperlipidemia type  -     Lipid Panel    Bradycardia: Benign, no lightheadedness or dizziness.  Patient required to follow for FAA certification   -Monitor   -Continue EP [id number]-[id number]. Schwartz    Essential hypertension: 144/98 today.  152/98 on repeat.  He does have some intermittently elevated blood pressures on past visits.  We will go ahead and initiate  lisinopril today.  -     lisinopril (PRINIVIL,ZESTRIL) 10 MG tablet; Take 1 tablet by mouth Daily.    Screening PSA (prostate specific antigen)  -     PSA Screen      Preventative:  Colonoscopy: 3/2020, due 3/3025  PSA: Ordered today  Shingles: Completed- 12/2019 and 2/2020  Tdap: 2016  Influenza: 2019    Follow-up in 6 months for hypertension and thyroid nodule

## 2020-04-22 LAB — HBA1C MFR BLD: 5.1 % (ref 3.5–5.6)

## 2020-05-15 ENCOUNTER — HOSPITAL ENCOUNTER (OUTPATIENT)
Dept: ULTRASOUND IMAGING | Facility: HOSPITAL | Age: 65
Discharge: HOME | End: 2020-05-15
Admitting: FAMILY MEDICINE

## 2020-05-15 DIAGNOSIS — E04.1 THYROID NODULE: ICD-10-CM

## 2020-05-15 DIAGNOSIS — E04.1 THYROID NODULE: Primary | ICD-10-CM

## 2020-05-15 PROCEDURE — 76536 US EXAM OF HEAD AND NECK: CPT

## 2020-06-09 ENCOUNTER — OFFICE VISIT (OUTPATIENT)
Dept: FAMILY MEDICINE CLINIC | Facility: CLINIC | Age: 65
End: 2020-06-09

## 2020-06-09 VITALS
SYSTOLIC BLOOD PRESSURE: 140 MMHG | TEMPERATURE: 97.4 F | DIASTOLIC BLOOD PRESSURE: 78 MMHG | BODY MASS INDEX: 27.83 KG/M2 | WEIGHT: 210 LBS | RESPIRATION RATE: 15 BRPM | HEART RATE: 64 BPM | OXYGEN SATURATION: 98 % | HEIGHT: 73 IN

## 2020-06-09 DIAGNOSIS — E04.1 THYROID NODULE: ICD-10-CM

## 2020-06-09 DIAGNOSIS — Z02.89 ENCOUNTER FOR FEDERAL AVIATION ADMINISTRATION (FAA) EXAMINATION: Primary | ICD-10-CM

## 2020-06-09 PROCEDURE — FAA1PHY: Performed by: FAMILY MEDICINE

## 2020-06-09 NOTE — ASSESSMENT & PLAN NOTE
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 UA done today was normal, EKG done today and read by myself shows sinus christiano with nonspecific changes. Vision 20/20 r/l corrected.

## 2020-06-09 NOTE — PROGRESS NOTES
Subjective   Nehemias Strong Jr. is a 64 y.o. male.     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 Answers for HPI/ROS submitted by the patient on 6/2/2020   What is the primary reason for your visit?: Physical      Family History   Problem Relation Age of Onset   • Lung cancer Mother         large cell   • No Known Problems Father    • Birth defects Brother         heart defect   • No Known Problems Maternal Grandmother    • No Known Problems Maternal Grandfather    • No Known Problems Paternal Grandmother    • No Known Problems Paternal Grandfather        Social History     Tobacco Use   • Smoking status: Never Smoker   • Smokeless tobacco: Never Used   Substance Use Topics   • Alcohol use: No     Frequency: Never   • Drug use: Never       Past Surgical History:   Procedure Laterality Date   • COLONOSCOPY     • DENTAL PROCEDURE     • HERNIA REPAIR Right     inguinal w/ Lilia       Patient Active Problem List   Diagnosis   • Vitamin D deficiency   • Migraine headache   • Kidney stones   • Idiopathic cyst of iris, ciliary body, or anterior chamber   • Hyperlipidemia   • Gastroesophageal reflux disease   • Enlarged prostate   • Benign lipomatous neoplasm of skin and subcutaneous tissue   • Bradycardia   • Cardiac arrhythmia   • Left lower lobe pneumonia   • Benign neoplasm of skin of eyelid   • Family history of lung cancer   • Need for other prophylactic vaccination and inoculation against single diseases   • Odynophagia   • Plantar fasciitis   • Rectal prolapse   • Thyroid nodule   • Encounter for Federal [employer] Administration (FAA) examination       Current Outpatient Medications on File Prior to Visit   Medication  "Sig Dispense Refill   • aspirin 81 MG chewable tablet Chew.     • famotidine (PEPCID) 40 MG tablet Take 40 mg by mouth every night at bedtime.     • lisinopril (PRINIVIL,ZESTRIL) 10 MG tablet Take 1 tablet by mouth Daily. 90 tablet 1   • Multiple Vitamin (MULTIVITAMIN) capsule Take 1 capsule by mouth Daily.     • benzonatate (TESSALON) 100 MG capsule Take 2 capsules by mouth 3 (Three) Times a Day As Needed for Cough. 40 capsule 1   • Fexofenadine HCl (ALLEGRA PO) Take  by mouth.     • glucosamine-chondroitin 500-400 MG capsule capsule Take 1 capsule by mouth 3 (Three) Times a Day With Meals.     • RA NASAL ALLERGY 55 MCG/ACT nasal inhaler 2 sprays Daily.       No current facility-administered medications on file prior to visit.        No Known Allergies    Review of Systems   All other systems reviewed and are negative.      Objective   Visit Vitals  /78 (BP Location: Right arm, Patient Position: Sitting, Cuff Size: Large Adult)   Pulse 64   Temp 97.4 °F (36.3 °C)   Resp 15   Ht 185.4 cm (73\")   Wt 95.3 kg (210 lb)   SpO2 98%   BMI 27.71 kg/m²     Physical Exam   Constitutional: He is oriented to person, place, and time. He appears well-developed and well-nourished. No distress.   HENT:   Head: Normocephalic and atraumatic.   Right Ear: External ear normal.   Left Ear: External ear normal.   Mouth/Throat: Oropharynx is clear and moist. No oropharyngeal exudate.   Eyes: Pupils are equal, round, and reactive to light. Conjunctivae, EOM and lids are normal. Lids are everted and swept, no foreign bodies found. Right eye exhibits no discharge and no exudate. Left eye exhibits no discharge and no exudate. Right conjunctiva is not injected. Left conjunctiva is not injected.   Fundoscopic exam:       The right eye shows no AV nicking, no exudate, no hemorrhage and no papilledema.        The left eye shows no AV nicking, no exudate, no hemorrhage and no papilledema.   Neck: Normal range of motion. Neck supple. No " thyromegaly present.   Cardiovascular: Normal rate, regular rhythm, normal heart sounds and intact distal pulses. Exam reveals no gallop and no friction rub.   No murmur heard.  Pulmonary/Chest: Effort normal and breath sounds normal. No respiratory distress. He has no wheezes. He exhibits no tenderness.   Abdominal: Soft. Bowel sounds are normal. He exhibits no distension. There is no tenderness. There is no rebound and no guarding. No hernia.   Musculoskeletal: Normal range of motion. He exhibits no edema, tenderness or deformity.   Lymphadenopathy:     He has no cervical adenopathy.        Right cervical: No superficial cervical adenopathy present.       Left cervical: No superficial cervical adenopathy present.   Neurological: He is alert and oriented to person, place, and time. He displays normal reflexes. No cranial nerve deficit or sensory deficit. He exhibits normal muscle tone. Coordination normal.   Skin: Skin is warm and dry. No rash noted. He is not diaphoretic. No erythema.   Psychiatric: He has a normal mood and affect. His behavior is normal. Judgment and thought content normal.         Assessment/Plan .  Problem List Items Addressed This Visit        Medium    Thyroid nodule    Current Assessment & Plan     Thyroid biopsy done and was benign.             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 UA done today was normal, EKG done today and read by myself shows sinus christiano with nonspecific changes. Vision 20/20 r/l corrected.

## 2020-06-24 ENCOUNTER — OFFICE VISIT (OUTPATIENT)
Dept: FAMILY MEDICINE CLINIC | Facility: CLINIC | Age: 65
End: 2020-06-24

## 2020-06-24 VITALS
DIASTOLIC BLOOD PRESSURE: 82 MMHG | BODY MASS INDEX: 27.84 KG/M2 | WEIGHT: 211 LBS | HEART RATE: 52 BPM | RESPIRATION RATE: 18 BRPM | SYSTOLIC BLOOD PRESSURE: 126 MMHG | OXYGEN SATURATION: 98 % | TEMPERATURE: 97.1 F

## 2020-06-24 DIAGNOSIS — R20.2 PARESTHESIA OF LEFT UPPER EXTREMITY: Primary | ICD-10-CM

## 2020-06-24 PROCEDURE — 99213 OFFICE O/P EST LOW 20 MIN: CPT | Performed by: NURSE PRACTITIONER

## 2020-06-24 NOTE — PROGRESS NOTES
Subjective   Nehemias Strong Jr. is a 64 y.o. male.     Chief Complaint   Patient presents with   • Numbness       /82 (BP Location: Right arm, Patient Position: Sitting, Cuff Size: Adult)   Pulse 52   Temp 97.1 °F (36.2 °C) (Temporal)   Resp 18   Wt 95.7 kg (211 lb)   SpO2 98%   BMI 27.84 kg/m²     BP Readings from Last 3 Encounters:   06/24/20 126/82   06/09/20 140/78   04/21/20 144/98       Wt Readings from Last 3 Encounters:   06/24/20 95.7 kg (211 lb)   06/09/20 95.3 kg (210 lb)   04/21/20 95.3 kg (210 lb)       Pt comes in today with c/o numbness and tingling in left fingers that has been going on for about 3 weeks. No injuries.   No neck pain or injuries.  Does have some lipomas in left arm and was wondering if those are compressing a nerve.   Numbness and tingling seems to wax and wane.   Nothing seems to make worse or better.     Sees Dr. Mcdermott routinely and saw him last week and didn't see anything abnormal.        The following portions of the patient's history were reviewed and updated as appropriate: allergies, current medications, past family history, past medical history, past social history, past surgical history and problem list.    Review of Systems   Musculoskeletal: Negative for arthralgias and joint swelling.   Neurological: Positive for numbness. Negative for weakness.       Objective   Physical Exam   Constitutional: He is oriented to person, place, and time. He appears well-developed and well-nourished.   Eyes: Pupils are equal, round, and reactive to light.   Cardiovascular: Normal rate and regular rhythm.   Pulmonary/Chest: Effort normal and breath sounds normal.   Musculoskeletal:        Left hand: Normal. Normal sensation noted. Normal strength noted.   Neurological: He is alert and oriented to person, place, and time.         Diagnoses and all orders for this visit:    1. Paresthesia of left upper extremity (Primary)  -     EMG 1 Limb; Future    EMG  Restart IBU rx  During  this office visit, we discussed the pertinent aspects of the visit and treatment recommendations. Pt verbalizes understanding. Follow up was discussed. Patient was given the opportunity to ask questions and discuss other concerns.       Return if symptoms worsen or fail to improve.

## 2020-07-15 ENCOUNTER — PROCEDURE VISIT (OUTPATIENT)
Dept: NEUROLOGY | Facility: CLINIC | Age: 65
End: 2020-07-15

## 2020-07-15 VITALS — TEMPERATURE: 97.1 F

## 2020-07-15 DIAGNOSIS — R20.2 PARESTHESIA OF LEFT UPPER EXTREMITY: ICD-10-CM

## 2020-07-15 PROCEDURE — 95908 NRV CNDJ TST 3-4 STUDIES: CPT | Performed by: PSYCHIATRY & NEUROLOGY

## 2020-07-15 PROCEDURE — 95886 MUSC TEST DONE W/N TEST COMP: CPT | Performed by: PSYCHIATRY & NEUROLOGY

## 2020-07-15 NOTE — PROGRESS NOTES
EMG and Nerve Conduction Studies    The complete report includes the data sheets.     Referring Doctor: Luna Dasilva APRN    History: LUE/Paresthesia of left upper extremity    Results:    1.  The left median sensory and motor nerve conduction studies were normal.    2.  The left ulnar sensory and motor nerve conduction studies were normal    3.  EMG of the muscles examined in the left C5-T1 myotomes were normal    Impression:    This is a normal study of the left upper extremity.  There is no evidence of focal median or ulnar neuropathy or neurogenic changes in the muscles examined in the C5-T1 myotomes.    Joseph Seipel, M.D.

## 2020-07-27 ENCOUNTER — TELEPHONE (OUTPATIENT)
Dept: FAMILY MEDICINE CLINIC | Facility: CLINIC | Age: 65
End: 2020-07-27

## 2020-07-27 NOTE — TELEPHONE ENCOUNTER
PATIENT STATES THAT HE GOT A NOTIFICATION TO GET HIS HEP C CHECKED. PATIENT REQUESTED TO GET A PHONE CALL ABOUT THIS MATTER.    BEST CALL BACK   [phone]

## 2020-08-31 ENCOUNTER — PREP FOR SURGERY (OUTPATIENT)
Dept: OTHER | Facility: HOSPITAL | Age: 65
End: 2020-08-31

## 2020-08-31 ENCOUNTER — OFFICE VISIT (OUTPATIENT)
Dept: SURGERY | Facility: CLINIC | Age: 65
End: 2020-08-31

## 2020-08-31 VITALS
HEIGHT: 73 IN | SYSTOLIC BLOOD PRESSURE: 132 MMHG | HEART RATE: 75 BPM | OXYGEN SATURATION: 98 % | TEMPERATURE: 97.7 F | WEIGHT: 212 LBS | DIASTOLIC BLOOD PRESSURE: 82 MMHG | BODY MASS INDEX: 28.1 KG/M2

## 2020-08-31 DIAGNOSIS — D17.30 BENIGN LIPOMATOUS NEOPLASM OF SKIN AND SUBCUTANEOUS TISSUE: Primary | ICD-10-CM

## 2020-08-31 PROCEDURE — 99203 OFFICE O/P NEW LOW 30 MIN: CPT | Performed by: SURGERY

## 2020-08-31 NOTE — PROGRESS NOTES
"Subjective   Nehemias Strong Jr. is a 64 y.o. male.   Chief Complaint   Patient presents with   • Mass     multiple lipomas     /82   Pulse 75   Temp 97.7 °F (36.5 °C) (Temporal)   Ht 185.4 cm (73\")   Wt 96.2 kg (212 lb)   SpO2 98%   BMI 27.97 kg/m²     HISTORY OF PRESENT ILLNESS:  Patient is a very pleasant 64-year-old gentleman who is known to me from a remote repair of a right inguinal hernia in about 2005.  He is done well from this.  We will pull.  He has had them removed when they become symptomatic.  Currently he has 5 specific lipomas that are painful one on the extensor surface of his right upper arm, 2 on his abdomen one in the periumbilical region and one in the left upper quadrant, and one each on bilateral upper outer thighs.  These have all gotten bigger and become more symptomatic and he desires excision.      The following portions of the patient's history were reviewed and updated as appropriate: allergies, current medications, past family history, past medical history, past social history, past surgical history and problem list.    Review of Systems   Constitutional: Negative for activity change and chills.   HENT: Negative for sore throat and voice change.    Eyes: Negative for blurred vision.   Respiratory: Negative for chest tightness.    Cardiovascular: Negative for chest pain.   Gastrointestinal: Negative for abdominal pain.   Endocrine: Negative for cold intolerance.   Genitourinary: Negative for urgency.   Musculoskeletal: Negative for arthralgias.   Skin: Negative for color change.   Neurological: Negative for syncope and confusion.   Hematological: Does not bruise/bleed easily.   Psychiatric/Behavioral: Negative for hallucinations.       Objective   Physical Exam   Constitutional: He is oriented to person, place, and time. He appears well-developed and well-nourished.   HENT:   Head: Normocephalic and atraumatic.   Eyes: EOM are normal.   Neck: Normal range of motion. "   Cardiovascular: Normal rate.   Pulmonary/Chest: Effort normal.   Abdominal: Soft.   Genitourinary:   Genitourinary Comments: Deferred   Musculoskeletal: Normal range of motion.   8 cm lipoma right upper arm, extensor surface  5 cm periumbilical lipoma  5 similar left upper quadrant lipoma  6 cm lipoma right lateral thigh  5 cm lipoma left lateral thigh     Neurological: He is alert and oriented to person, place, and time.   Skin: Skin is warm and dry.   Psychiatric: He has a normal mood and affect.         Assessment/Plan   Nehemias was seen today for mass.    Diagnoses and all orders for this visit:    Benign lipomatous neoplasm of skin and subcutaneous tissue    Patient has multiple lipomas only the 5 biggest are symptomatic and tender.  Therefore we will proceed with excision of these under general anesthetic.    Enriqueta Rodrigues MD  8/31/2020  11:15 AM

## 2020-08-31 NOTE — H&P
Subjective   Nehemias Strong Jr. is a 64 y.o. male.   No chief complaint on file.    There were no vitals taken for this visit.    HISTORY OF PRESENT ILLNESS:  Patient is a very pleasant 64-year-old gentleman who is known to me from a remote repair of a right inguinal hernia in about 2005.  He is done well from this.  We will pull.  He has had them removed when they become symptomatic.  Currently he has 5 specific lipomas that are painful one on the extensor surface of his right upper arm, 2 on his abdomen one in the periumbilical region and one in the left upper quadrant, and one each on bilateral upper outer thighs.  These have all gotten bigger and become more symptomatic and he desires excision.      The following portions of the patient's history were reviewed and updated as appropriate: allergies, current medications, past family history, past medical history, past social history, past surgical history and problem list.    Review of Systems   Constitutional: Negative for activity change and chills.   HENT: Negative for sore throat and voice change.    Eyes: Negative for blurred vision.   Respiratory: Negative for chest tightness.    Cardiovascular: Negative for chest pain.   Gastrointestinal: Negative for abdominal pain.   Endocrine: Negative for cold intolerance.   Genitourinary: Negative for urgency.   Musculoskeletal: Negative for arthralgias.   Skin: Negative for color change.   Neurological: Negative for syncope and confusion.   Hematological: Does not bruise/bleed easily.   Psychiatric/Behavioral: Negative for hallucinations.       Objective   Physical Exam   Constitutional: He is oriented to person, place, and time. He appears well-developed and well-nourished.   HENT:   Head: Normocephalic and atraumatic.   Eyes: EOM are normal.   Neck: Normal range of motion.   Cardiovascular: Normal rate.   Pulmonary/Chest: Effort normal.   Abdominal: Soft.   Genitourinary:   Genitourinary Comments: Deferred    Musculoskeletal: Normal range of motion.   8 cm lipoma right upper arm, extensor surface  5 cm periumbilical lipoma  5 similar left upper quadrant lipoma  6 cm lipoma right lateral thigh  5 cm lipoma left lateral thigh     Neurological: He is alert and oriented to person, place, and time.   Skin: Skin is warm and dry.   Psychiatric: He has a normal mood and affect.         Assessment/Plan   There are no diagnoses linked to this encounter.Patient has multiple lipomas only the 5 biggest are symptomatic and tender.  Therefore we will proceed with excision of these under general anesthetic.    Enriqueta Rodrigues MD  8/31/2020  11:15 AM

## 2020-09-09 ENCOUNTER — HOSPITAL ENCOUNTER (OUTPATIENT)
Dept: CARDIOLOGY | Facility: HOSPITAL | Age: 65
Discharge: HOME | End: 2020-09-09

## 2020-09-09 ENCOUNTER — LAB (OUTPATIENT)
Dept: LAB | Facility: HOSPITAL | Age: 65
End: 2020-09-09

## 2020-09-09 DIAGNOSIS — D17.30 BENIGN LIPOMATOUS NEOPLASM OF SKIN AND SUBCUTANEOUS TISSUE: ICD-10-CM

## 2020-09-09 LAB
ANION GAP SERPL CALCULATED.3IONS-SCNC: 10.5 MMOL/L (ref 5–15)
BUN SERPL-MCNC: 18 MG/DL (ref 8–23)
BUN/CREAT SERPL: 14.1 (ref 7–25)
CALCIUM SPEC-SCNC: 9.8 MG/DL (ref 8.6–10.5)
CHLORIDE SERPL-SCNC: 101 MMOL/L (ref 98–107)
CO2 SERPL-SCNC: 24.5 MMOL/L (ref 22–29)
CREAT SERPL-MCNC: 1.28 MG/DL (ref 0.76–1.27)
DEPRECATED RDW RBC AUTO: 40.9 FL (ref 37–54)
ERYTHROCYTE [DISTWIDTH] IN BLOOD BY AUTOMATED COUNT: 12.7 % (ref 12.3–15.4)
GFR SERPL CREATININE-BSD FRML MDRD: 57 ML/MIN/1.73
GLUCOSE SERPL-MCNC: 87 MG/DL (ref 65–99)
HCT VFR BLD AUTO: 50.3 % (ref 37.5–51)
HGB BLD-MCNC: 16.9 G/DL (ref 13–17.7)
MCH RBC QN AUTO: 29.5 PG (ref 26.6–33)
MCHC RBC AUTO-ENTMCNC: 33.6 G/DL (ref 31.5–35.7)
MCV RBC AUTO: 87.8 FL (ref 79–97)
PLATELET # BLD AUTO: 209 10*3/MM3 (ref 140–450)
PMV BLD AUTO: 9.7 FL (ref 6–12)
POTASSIUM SERPL-SCNC: 4.6 MMOL/L (ref 3.5–5.2)
RBC # BLD AUTO: 5.73 10*6/MM3 (ref 4.14–5.8)
SODIUM SERPL-SCNC: 136 MMOL/L (ref 136–145)
WBC # BLD AUTO: 5.36 10*3/MM3 (ref 3.4–10.8)

## 2020-09-09 PROCEDURE — 85027 COMPLETE CBC AUTOMATED: CPT

## 2020-09-09 PROCEDURE — 80048 BASIC METABOLIC PNL TOTAL CA: CPT

## 2020-09-09 PROCEDURE — 93005 ELECTROCARDIOGRAM TRACING: CPT | Performed by: SURGERY

## 2020-09-11 PROCEDURE — 93010 ELECTROCARDIOGRAM REPORT: CPT | Performed by: INTERNAL MEDICINE

## 2020-09-12 ENCOUNTER — LAB (OUTPATIENT)
Dept: LAB | Facility: HOSPITAL | Age: 65
End: 2020-09-12

## 2020-09-12 PROCEDURE — U0004 COV-19 TEST NON-CDC HGH THRU: HCPCS

## 2020-09-12 PROCEDURE — C9803 HOPD COVID-19 SPEC COLLECT: HCPCS

## 2020-09-13 LAB — SARS-COV-2 RNA NOSE QL NAA+PROBE: NOT DETECTED

## 2020-09-27 DIAGNOSIS — I10 ESSENTIAL HYPERTENSION: ICD-10-CM

## 2020-09-28 RX ORDER — LISINOPRIL 10 MG/1
10 TABLET ORAL DAILY
Qty: 90 TABLET | Refills: 1 | Status: SHIPPED | OUTPATIENT
Start: 2020-09-28 | End: 2021-03-29

## 2020-10-23 ENCOUNTER — OFFICE VISIT (OUTPATIENT)
Dept: FAMILY MEDICINE CLINIC | Facility: CLINIC | Age: 65
End: 2020-10-23

## 2020-10-23 VITALS
WEIGHT: 214 LBS | BODY MASS INDEX: 28.36 KG/M2 | HEIGHT: 73 IN | TEMPERATURE: 97.5 F | SYSTOLIC BLOOD PRESSURE: 130 MMHG | HEART RATE: 160 BPM | RESPIRATION RATE: 16 BRPM | OXYGEN SATURATION: 97 % | DIASTOLIC BLOOD PRESSURE: 82 MMHG

## 2020-10-23 DIAGNOSIS — J06.9 VIRAL URI: ICD-10-CM

## 2020-10-23 DIAGNOSIS — R00.1 BRADYCARDIA: ICD-10-CM

## 2020-10-23 DIAGNOSIS — E04.1 THYROID NODULE: Primary | ICD-10-CM

## 2020-10-23 DIAGNOSIS — R20.0 NUMBNESS: ICD-10-CM

## 2020-10-23 DIAGNOSIS — I10 ESSENTIAL HYPERTENSION: ICD-10-CM

## 2020-10-23 PROCEDURE — 99214 OFFICE O/P EST MOD 30 MIN: CPT | Performed by: FAMILY MEDICINE

## 2020-10-23 RX ORDER — AZITHROMYCIN 250 MG/1
TABLET, FILM COATED ORAL
Qty: 6 TABLET | Refills: 0 | Status: SHIPPED | OUTPATIENT
Start: 2020-10-23 | End: 2021-01-25

## 2020-10-23 NOTE — PROGRESS NOTES
Chief Complaint   Patient presents with   • Hypertension     HPI  Nehemias Strong Jr. is a 64 y.o. male that presents for f/u of multiple medical problems    HTN: 130/82 today. Taking BP at home- generally 110-120 systolic. Maintained on lisinopril 10. No LH/dizziness, CP or SOB    Thyroid nodule: last US 5/2020 w/ 2.4cm nodule. He is following w/ ENT- Dr PRATHER. Plan for repeat US next     LUE numbness: saw Brown in June 2020. EMG normal w/ Seipel. Has performed PT w/ good results. Now essentially back to normal- no further issues.    Bradycardia: follows w/ CHRISTIANO- Luis. No LH/dizziness, fatigue, or other symptoms. Has to get regular holters for FAA. 5/2020 Holter reviewed    Review of Systems   Constitutional: Negative for chills, fever and unexpected weight loss.   HENT: Negative for congestion, rhinorrhea and sore throat.    Eyes: Negative for visual disturbance.   Respiratory: Negative for cough and shortness of breath.    Cardiovascular: Negative for chest pain and palpitations.   Gastrointestinal: Negative for abdominal pain, constipation, diarrhea, nausea and vomiting.   Genitourinary: Negative for difficulty urinating and dysuria.   Musculoskeletal: Negative for arthralgias and joint swelling.   Skin: Negative for rash and skin lesions.   Neurological: Negative for weakness and headache.   Psychiatric/Behavioral: Negative for depressed mood. The patient is not nervous/anxious.      The following portions of the patient's history were reviewed and updated as appropriate: problem list, past medical history, past surgical history, allergies, current medications, past social history and past family history.    Problem List Tab  Patient History Tab  Immunizations Tab  Medications Tab  Chart Review Tab  Care Everywhere Tab  Synopsis Tab    PE  Vitals:    10/23/20 0937   BP: 130/82   Pulse: (!) 160   Resp: 16   Temp: 97.5 °F (36.4 °C)   SpO2: 97%   **HR 60- entered in error  Body mass index is 28.23  kg/m².  General: Well nourished, NAD  Head: AT/NC  Eyes: EOMI, anicteric sclera  Resp: CTAB, SCR, BS equal  CV: RRR w/o m/r/g; 2+ pulses  GI: Soft, NT/ND, +BS  MSK: FROM, no deformity, no edema  Skin: Warm, dry, intact  Neuro: Alert and oriented. No focal deficits  Psych: Appropriate mood and affect    Imaging  No Images in the past 120 days found..    Assessment/Plan   Nehemias Strong Jr. is a 64 y.o. male that presents for   Chief Complaint   Patient presents with   • Hypertension     Diagnoses and all orders for this visit:    1. Thyroid nodule (Primary): May 2020 ultrasound reviewed   -Continue ENT bidxic-eh-Ur. K   -Plan for repeat ultrasound next month per ENT    2. Essential hypertension: 130/82 today   -Continue home lisinopril 10 mg daily   -FAA form completed today    3. Numbness: Now essentially resolved following PT   -Monitor    4. Bradycardia: Heart rate 60 today.  May 2020 Holter reviewed   -Continue cards [id number]   -Monitor    5. Viral URI  -     azithromycin (Zithromax Z-Derrick) 250 MG tablet; Take 2 tablets on first day, then 1 tablet for 4 days  Dispense: 6 tablet; Refill: 0    Follow-up in 6 months for annual physical

## 2020-12-07 ENCOUNTER — OFFICE VISIT (OUTPATIENT)
Dept: FAMILY MEDICINE CLINIC | Facility: CLINIC | Age: 65
End: 2020-12-07

## 2020-12-07 VITALS
BODY MASS INDEX: 28.81 KG/M2 | SYSTOLIC BLOOD PRESSURE: 124 MMHG | WEIGHT: 217.4 LBS | OXYGEN SATURATION: 99 % | TEMPERATURE: 96.6 F | HEART RATE: 72 BPM | HEIGHT: 73 IN | RESPIRATION RATE: 18 BRPM | DIASTOLIC BLOOD PRESSURE: 80 MMHG

## 2020-12-07 DIAGNOSIS — Z02.89 ENCOUNTER FOR FEDERAL AVIATION ADMINISTRATION (FAA) EXAMINATION: Primary | ICD-10-CM

## 2020-12-07 PROCEDURE — FAA1PHY: Performed by: FAMILY MEDICINE

## 2020-12-07 NOTE — PROGRESS NOTES
Subjective   Nehemias Strong Jr. is a 65 y.o. male.     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    Allergies:  No Known Allergies    Social History:  Social History     Socioeconomic History   • Marital status:      Spouse name: Not on file   • Number of children: Not on file   • Years of education: Not on file   • Highest education level: Not on file   Tobacco Use   • Smoking status: Never Smoker   • Smokeless tobacco: Never Used   Substance and Sexual Activity   • Alcohol use: No     Frequency: Never   • Drug use: Never   • Sexual activity: Defer   Social History Narrative    Lives w/ wife. Flies for [employer]       Family History:  Family History   Problem Relation Age of Onset   • Lung cancer Mother         large cell   • No Known Problems Father    • Birth defects Brother         heart defect   • No Known Problems Maternal Grandmother    • No Known Problems Maternal Grandfather    • No Known Problems Paternal Grandmother    • No Known Problems Paternal Grandfather        Past Medical History :  Patient Active Problem List   Diagnosis   • Vitamin D deficiency   • Migraine headache   • Kidney stones   • Idiopathic cyst of iris, ciliary body, or anterior chamber   • Hyperlipidemia   • Gastroesophageal reflux disease   • Enlarged prostate   • Benign lipomatous neoplasm of skin and subcutaneous tissue   • Bradycardia   • Cardiac arrhythmia   • Left lower lobe pneumonia   • Benign neoplasm of skin of eyelid   • Family history of lung cancer   • Need for other prophylactic vaccination and inoculation against single diseases   • Odynophagia   • Plantar fasciitis   • Rectal prolapse   • Thyroid nodule   • Encounter for Federal Aviation Administration (FAA)  "examination   • Hypertension       Medication List:  Outpatient Encounter Medications as of 12/7/2020   Medication Sig Dispense Refill   • aspirin 81 MG chewable tablet Chew 81 mg Daily. LD 8/27     • azithromycin (Zithromax Z-Derrick) 250 MG tablet Take 2 tablets on first day, then 1 tablet for 4 days 6 tablet 0   • famotidine (PEPCID) 40 MG tablet Take 40 mg by mouth every night at bedtime.     • glucosamine-chondroitin 500-400 MG capsule capsule Take 1 capsule by mouth 3 (Three) Times a Day With Meals.     • lisinopril (PRINIVIL,ZESTRIL) 10 MG tablet TAKE 1 TABLET BY MOUTH DAILY 90 tablet 1   • Multiple Vitamin (MULTIVITAMIN) capsule Take 1 capsule by mouth Daily. LD 8/26     • RA NASAL ALLERGY 55 MCG/ACT nasal inhaler 2 sprays Daily.       No facility-administered encounter medications on file as of 12/7/2020.        Past Surgical History:  Past Surgical History:   Procedure Laterality Date   • COLONOSCOPY     • DENTAL PROCEDURE     • HERNIA REPAIR Right     inguinal shaina/ Lilia       Review of Systems:  Review of Systems   Constitutional: Negative.    HENT: Negative.    Eyes: Negative.    Respiratory: Negative.    Cardiovascular: Negative.    Gastrointestinal: Negative.    Endocrine: Negative.    Genitourinary: Negative.    Skin: Negative.    Allergic/Immunologic: Negative.    Neurological: Negative.    Hematological: Negative.    Psychiatric/Behavioral: Negative.        I have reviewed and confirmed the accuracy of the HPI and ROS as documented by the MA/LPN/RN Adeel Sweet MD    Vital Signs:  Visit Vitals  /80 (BP Location: Right arm, Patient Position: Sitting, Cuff Size: Adult)   Pulse 72   Temp 96.6 °F (35.9 °C)   Resp 18   Ht 185.4 cm (73\")   Wt 98.6 kg (217 lb 6.4 oz)   SpO2 99%   BMI 28.68 kg/m²       Physical Exam  Vitals signs and nursing note reviewed.   Constitutional:       General: He is not in acute distress.     Appearance: He is well-developed. He is not diaphoretic.   HENT:      Head: " Normocephalic and atraumatic.      Right Ear: External ear normal.      Left Ear: External ear normal.      Mouth/Throat:      Pharynx: No oropharyngeal exudate.   Eyes:      General: Lids are normal. Lids are everted, no foreign bodies appreciated.         Right eye: No discharge.         Left eye: No discharge.      Conjunctiva/sclera: Conjunctivae normal.      Right eye: Right conjunctiva is not injected. No exudate.     Left eye: Left conjunctiva is not injected. No exudate.     Pupils: Pupils are equal, round, and reactive to light.      Funduscopic exam:     Right eye: No hemorrhage, exudate, AV nicking or papilledema.         Left eye: No hemorrhage, exudate, AV nicking or papilledema.      Comments: Visual field normal to 90 degrees bilat   Neck:      Musculoskeletal: Normal range of motion and neck supple.      Thyroid: No thyromegaly.   Cardiovascular:      Rate and Rhythm: Normal rate and regular rhythm.      Heart sounds: Normal heart sounds. No murmur. No friction rub. No gallop.    Pulmonary:      Effort: Pulmonary effort is normal. No respiratory distress.      Breath sounds: Normal breath sounds. No wheezing.   Chest:      Chest wall: No tenderness.   Abdominal:      General: Bowel sounds are normal. There is no distension.      Palpations: Abdomen is soft.      Tenderness: There is no abdominal tenderness. There is no guarding or rebound.      Hernia: No hernia is present.   Genitourinary:     Comments: Penis normal  testicle descended bilataral without abnorm  Musculoskeletal: Normal range of motion.         General: No tenderness or deformity.   Lymphadenopathy:      Cervical: No cervical adenopathy.      Right cervical: No superficial cervical adenopathy.     Left cervical: No superficial cervical adenopathy.   Skin:     General: Skin is warm and dry.      Findings: No erythema or rash.   Neurological:      Mental Status: He is alert and oriented to person, place, and time.      Cranial Nerves:  No cranial nerve deficit.      Sensory: No sensory deficit.      Motor: No abnormal muscle tone.      Coordination: Coordination normal.      Gait: Gait normal.      Deep Tendon Reflexes: Reflexes normal.   Psychiatric:         Behavior: Behavior normal.         Thought Content: Thought content normal.         Judgment: Judgment normal.         Assessment and Plan:  Problem List Items Addressed This Visit        Unprioritized    Encounter for Federal Aviation Administration (FAA) examination - Primary    Current Assessment & Plan     [place] flight of flight exam was benign.  He does have hyper tension tachycardia worksheet was completed by Dr. Jona Cherry               An After Visit Summary and PPPS were given to the patient.       I wore protective equipment throughout this patient encounter to include mask, eye protection and face shield. Hand hygiene was performed before donning protective equipment and after removal when leaving the room.  Answers for HPI/ROS submitted by the patient on 12/5/2020   What is the primary reason for your visit?: Other  Please describe your symptoms.: None.  Required FAA Flight Physical for work.  Have you had these symptoms before?: No  How long have you been having these symptoms?: 1-4 days  Please list any medications you are currently taking for this condition.: None.  Please describe any probable cause for these symptoms. : None.

## 2020-12-13 NOTE — ASSESSMENT & PLAN NOTE
[place] flight of flight exam was benign.  He does have hyper tension tachycardia worksheet was completed by Dr. Jona Cherry

## 2020-12-21 ENCOUNTER — PREP FOR SURGERY (OUTPATIENT)
Dept: OTHER | Facility: HOSPITAL | Age: 65
End: 2020-12-21

## 2020-12-21 ENCOUNTER — OFFICE VISIT (OUTPATIENT)
Dept: SURGERY | Facility: CLINIC | Age: 65
End: 2020-12-21

## 2020-12-21 VITALS
DIASTOLIC BLOOD PRESSURE: 95 MMHG | WEIGHT: 218 LBS | BODY MASS INDEX: 28.89 KG/M2 | OXYGEN SATURATION: 98 % | TEMPERATURE: 97.5 F | HEIGHT: 73 IN | HEART RATE: 60 BPM | SYSTOLIC BLOOD PRESSURE: 145 MMHG

## 2020-12-21 DIAGNOSIS — D17.1 LIPOMA OF TORSO: Primary | ICD-10-CM

## 2020-12-21 DIAGNOSIS — D17.9 LIPOMA: Primary | ICD-10-CM

## 2020-12-21 PROCEDURE — 99213 OFFICE O/P EST LOW 20 MIN: CPT | Performed by: SURGERY

## 2020-12-21 RX ORDER — SODIUM CHLORIDE 9 MG/ML
100 INJECTION, SOLUTION INTRAVENOUS CONTINUOUS
Status: CANCELLED | OUTPATIENT
Start: 2020-12-21

## 2020-12-21 NOTE — PROGRESS NOTES
Subjective   Nehemias Strong Jr. is a 65 y.o. male.     History of present illness  Mr. Buchanan is a pleasant 65-year-old seen in the office today at Dr. Ponce's exit for rescheduling of lipoma excision.  He has innumerable lipomas.  He has 2 on the anterior abdominal wall that measure 5 cm plus that are tender.  One is above the umbilicus 1 is in the right lower quadrant.  He has 2 on the left forearm extensor surface that bother him he has 1 on each hip up high that bother him and one in the right upper arm lateral aspect that again is quite large about 5 cm.  We will get him an operating room time to have these removed under general anesthesia.    Past Medical History:   Diagnosis Date   • Bradycardia 8/16/2019    Followed by cardiologist with holter monitor every 6 months.   • Cardiac arrhythmia 8/16/2019   • Gastroesophageal reflux disease 1/14/2019   • Hyperlipidemia 8/16/2019   • Kidney stone     follows w/ Thaddeus   • Onychomycosis     follow w/ podiatry- Dr Stock   • Rectal prolapse 5/1/2018   • Sleep apnea     Cpap       Past Surgical History:   Procedure Laterality Date   • COLONOSCOPY     • DENTAL PROCEDURE     • HERNIA REPAIR Right     inguinal w/ Lilia       Outpatient Encounter Medications as of 12/21/2020   Medication Sig Dispense Refill   • aspirin 81 MG chewable tablet Chew 81 mg Daily. LD 8/27     • famotidine (PEPCID) 40 MG tablet Take 40 mg by mouth every night at bedtime.     • glucosamine-chondroitin 500-400 MG capsule capsule Take 1 capsule by mouth 3 (Three) Times a Day With Meals.     • lisinopril (PRINIVIL,ZESTRIL) 10 MG tablet TAKE 1 TABLET BY MOUTH DAILY 90 tablet 1   • Multiple Vitamin (MULTIVITAMIN) capsule Take 1 capsule by mouth Daily. LD 8/26     • RA NASAL ALLERGY 55 MCG/ACT nasal inhaler 2 sprays Daily.     • azithromycin (Zithromax Z-Derrick) 250 MG tablet Take 2 tablets on first day, then 1 tablet for 4 days 6 tablet 0     No facility-administered encounter medications on  file as of 12/21/2020.        No Known Allergies    Family History   Problem Relation Age of Onset   • Lung cancer Mother         large cell   • No Known Problems Father    • Birth defects Brother         heart defect   • No Known Problems Maternal Grandmother    • No Known Problems Maternal Grandfather    • No Known Problems Paternal Grandmother    • No Known Problems Paternal Grandfather        Social History     Socioeconomic History   • Marital status:      Spouse name: Not on file   • Number of children: Not on file   • Years of education: Not on file   • Highest education level: Not on file   Tobacco Use   • Smoking status: Never Smoker   • Smokeless tobacco: Never Used   Substance and Sexual Activity   • Alcohol use: No     Frequency: Never   • Drug use: Never   • Sexual activity: Defer   Social History Narrative    Lives w/ wife. Flies for [employer]       The following portions of the patient's history were reviewed and updated as appropriate: allergies, current medications, past family history, past medical history, past social history, past surgical history and problem list.    Objective     Complete review of systems is done and unremarkable with exception the chief complaint    Physical exam shows pleasant 65-year-old male.  HEENT is negative.  Heart regular.  Lungs clear.  Abdomen soft nontender with the exception of the 2 areas of lipoma.  No other palpable masses.  Extremities show equal range of motion in the upper and lower extremities.  He has symmetrical strength and usage.  Neuro shows no obvious focal deficit.  For more information on the location of the lipoma see above.    Impression: Multiple symptomatic lipomas    Plan: Surgical excision in the operating room under general anesthetic.  Assessment/Plan   There are no diagnoses linked to this encounter.               Williams Stafford,   12/21/2020  14:16 EST

## 2020-12-29 ENCOUNTER — TELEPHONE (OUTPATIENT)
Dept: FAMILY MEDICINE CLINIC | Facility: CLINIC | Age: 65
End: 2020-12-29

## 2020-12-29 DIAGNOSIS — Z20.822 CLOSE EXPOSURE TO COVID-19 VIRUS: Primary | ICD-10-CM

## 2020-12-29 NOTE — TELEPHONE ENCOUNTER
Patient was exposed to Covid at work on Sunday 12/27.  His daughter is getting  this coming Saturday.  He has no symptoms besides his normal allergy symptoms of runny nose and headache.  Patient is wanting an order for a Covid test be put in at [facility] on Thursday, which would be day #5, so he can get results back on Friday before the wedding plans.  Notify patient after order has been put in.

## 2020-12-31 ENCOUNTER — LAB (OUTPATIENT)
Dept: LAB | Facility: HOSPITAL | Age: 65
End: 2020-12-31

## 2020-12-31 DIAGNOSIS — D17.9 LIPOMA: ICD-10-CM

## 2020-12-31 DIAGNOSIS — Z20.822 CLOSE EXPOSURE TO COVID-19 VIRUS: ICD-10-CM

## 2020-12-31 LAB
ALBUMIN SERPL-MCNC: 4.3 G/DL (ref 3.5–5.2)
ALBUMIN/GLOB SERPL: 1.5 G/DL
ALP SERPL-CCNC: 68 U/L (ref 39–117)
ALT SERPL W P-5'-P-CCNC: 44 U/L (ref 1–41)
ANION GAP SERPL CALCULATED.3IONS-SCNC: 8.6 MMOL/L (ref 5–15)
AST SERPL-CCNC: 28 U/L (ref 1–40)
BASOPHILS # BLD AUTO: 0.04 10*3/MM3 (ref 0–0.2)
BASOPHILS NFR BLD AUTO: 0.7 % (ref 0–1.5)
BILIRUB SERPL-MCNC: 0.6 MG/DL (ref 0–1.2)
BUN SERPL-MCNC: 20 MG/DL (ref 8–23)
BUN/CREAT SERPL: 15.2 (ref 7–25)
CALCIUM SPEC-SCNC: 9.2 MG/DL (ref 8.6–10.5)
CHLORIDE SERPL-SCNC: 102 MMOL/L (ref 98–107)
CO2 SERPL-SCNC: 29.4 MMOL/L (ref 22–29)
CREAT SERPL-MCNC: 1.32 MG/DL (ref 0.76–1.27)
DEPRECATED RDW RBC AUTO: 41.4 FL (ref 37–54)
EOSINOPHIL # BLD AUTO: 0.09 10*3/MM3 (ref 0–0.4)
EOSINOPHIL NFR BLD AUTO: 1.5 % (ref 0.3–6.2)
ERYTHROCYTE [DISTWIDTH] IN BLOOD BY AUTOMATED COUNT: 12.8 % (ref 12.3–15.4)
GFR SERPL CREATININE-BSD FRML MDRD: 55 ML/MIN/1.73
GLOBULIN UR ELPH-MCNC: 2.8 GM/DL
GLUCOSE SERPL-MCNC: 99 MG/DL (ref 65–99)
HCT VFR BLD AUTO: 46.9 % (ref 37.5–51)
HGB BLD-MCNC: 16 G/DL (ref 13–17.7)
IMM GRANULOCYTES # BLD AUTO: 0.01 10*3/MM3 (ref 0–0.05)
IMM GRANULOCYTES NFR BLD AUTO: 0.2 % (ref 0–0.5)
LYMPHOCYTES # BLD AUTO: 2.48 10*3/MM3 (ref 0.7–3.1)
LYMPHOCYTES NFR BLD AUTO: 40.7 % (ref 19.6–45.3)
MCH RBC QN AUTO: 30.1 PG (ref 26.6–33)
MCHC RBC AUTO-ENTMCNC: 34.1 G/DL (ref 31.5–35.7)
MCV RBC AUTO: 88.2 FL (ref 79–97)
MONOCYTES # BLD AUTO: 0.5 10*3/MM3 (ref 0.1–0.9)
MONOCYTES NFR BLD AUTO: 8.2 % (ref 5–12)
NEUTROPHILS NFR BLD AUTO: 2.97 10*3/MM3 (ref 1.7–7)
NEUTROPHILS NFR BLD AUTO: 48.7 % (ref 42.7–76)
NRBC BLD AUTO-RTO: 0 /100 WBC (ref 0–0.2)
PLATELET # BLD AUTO: 204 10*3/MM3 (ref 140–450)
PMV BLD AUTO: 9.5 FL (ref 6–12)
POTASSIUM SERPL-SCNC: 4 MMOL/L (ref 3.5–5.2)
PROT SERPL-MCNC: 7.1 G/DL (ref 6–8.5)
RBC # BLD AUTO: 5.32 10*6/MM3 (ref 4.14–5.8)
SARS-COV-2 ORF1AB RESP QL NAA+PROBE: NOT DETECTED
SODIUM SERPL-SCNC: 140 MMOL/L (ref 136–145)
WBC # BLD AUTO: 6.09 10*3/MM3 (ref 3.4–10.8)

## 2020-12-31 PROCEDURE — 36415 COLL VENOUS BLD VENIPUNCTURE: CPT

## 2020-12-31 PROCEDURE — U0004 COV-19 TEST NON-CDC HGH THRU: HCPCS

## 2020-12-31 PROCEDURE — 80053 COMPREHEN METABOLIC PANEL: CPT

## 2020-12-31 PROCEDURE — C9803 HOPD COVID-19 SPEC COLLECT: HCPCS

## 2020-12-31 PROCEDURE — 85025 COMPLETE CBC W/AUTO DIFF WBC: CPT

## 2021-01-05 DIAGNOSIS — J06.9 VIRAL URI: ICD-10-CM

## 2021-01-05 RX ORDER — AZITHROMYCIN 250 MG/1
TABLET, FILM COATED ORAL
Qty: 6 TABLET | Refills: 0 | OUTPATIENT
Start: 2021-01-05

## 2021-01-09 ENCOUNTER — HOSPITAL ENCOUNTER (OUTPATIENT)
Dept: CARDIOLOGY | Facility: HOSPITAL | Age: 66
Discharge: HOME | End: 2021-01-09

## 2021-01-09 ENCOUNTER — LAB (OUTPATIENT)
Dept: LAB | Facility: HOSPITAL | Age: 66
End: 2021-01-09

## 2021-01-09 LAB
ANION GAP SERPL CALCULATED.3IONS-SCNC: 10.8 MMOL/L (ref 5–15)
BUN SERPL-MCNC: 20 MG/DL (ref 8–23)
BUN/CREAT SERPL: 14.7 (ref 7–25)
CALCIUM SPEC-SCNC: 9.1 MG/DL (ref 8.6–10.5)
CHLORIDE SERPL-SCNC: 104 MMOL/L (ref 98–107)
CO2 SERPL-SCNC: 26.2 MMOL/L (ref 22–29)
CREAT SERPL-MCNC: 1.36 MG/DL (ref 0.76–1.27)
DEPRECATED RDW RBC AUTO: 39.8 FL (ref 37–54)
ERYTHROCYTE [DISTWIDTH] IN BLOOD BY AUTOMATED COUNT: 12.5 % (ref 12.3–15.4)
GFR SERPL CREATININE-BSD FRML MDRD: 53 ML/MIN/1.73
GLUCOSE SERPL-MCNC: 133 MG/DL (ref 65–99)
HCT VFR BLD AUTO: 46.3 % (ref 37.5–51)
HGB BLD-MCNC: 15.7 G/DL (ref 13–17.7)
MCH RBC QN AUTO: 29.8 PG (ref 26.6–33)
MCHC RBC AUTO-ENTMCNC: 33.9 G/DL (ref 31.5–35.7)
MCV RBC AUTO: 87.9 FL (ref 79–97)
PLATELET # BLD AUTO: 207 10*3/MM3 (ref 140–450)
PMV BLD AUTO: 9.7 FL (ref 6–12)
POTASSIUM SERPL-SCNC: 4 MMOL/L (ref 3.5–5.2)
RBC # BLD AUTO: 5.27 10*6/MM3 (ref 4.14–5.8)
SARS-COV-2 ORF1AB RESP QL NAA+PROBE: NOT DETECTED
SODIUM SERPL-SCNC: 141 MMOL/L (ref 136–145)
WBC # BLD AUTO: 5.29 10*3/MM3 (ref 3.4–10.8)

## 2021-01-09 PROCEDURE — 93010 ELECTROCARDIOGRAM REPORT: CPT | Performed by: INTERNAL MEDICINE

## 2021-01-09 PROCEDURE — 80048 BASIC METABOLIC PNL TOTAL CA: CPT

## 2021-01-09 PROCEDURE — 85027 COMPLETE CBC AUTOMATED: CPT

## 2021-01-09 PROCEDURE — C9803 HOPD COVID-19 SPEC COLLECT: HCPCS

## 2021-01-09 PROCEDURE — U0004 COV-19 TEST NON-CDC HGH THRU: HCPCS

## 2021-01-09 PROCEDURE — 93005 ELECTROCARDIOGRAM TRACING: CPT | Performed by: SURGERY

## 2021-01-10 LAB — QT INTERVAL: 418 MS

## 2021-01-12 ENCOUNTER — ANESTHESIA (OUTPATIENT)
Dept: PERIOP | Facility: HOSPITAL | Age: 66
End: 2021-01-12

## 2021-01-12 ENCOUNTER — ANESTHESIA EVENT (OUTPATIENT)
Dept: PERIOP | Facility: HOSPITAL | Age: 66
End: 2021-01-12

## 2021-01-12 ENCOUNTER — HOSPITAL ENCOUNTER (OUTPATIENT)
Facility: HOSPITAL | Age: 66
Setting detail: HOSPITAL OUTPATIENT SURGERY
Discharge: HOME | End: 2021-01-12
Attending: SURGERY | Admitting: SURGERY

## 2021-01-12 VITALS
DIASTOLIC BLOOD PRESSURE: 84 MMHG | OXYGEN SATURATION: 95 % | SYSTOLIC BLOOD PRESSURE: 168 MMHG | RESPIRATION RATE: 17 BRPM | BODY MASS INDEX: 28.84 KG/M2 | WEIGHT: 217.59 LBS | TEMPERATURE: 97.1 F | HEART RATE: 63 BPM | HEIGHT: 73 IN

## 2021-01-12 DIAGNOSIS — D17.1 LIPOMA OF TORSO: Primary | ICD-10-CM

## 2021-01-12 DIAGNOSIS — D17.9 LIPOMA: ICD-10-CM

## 2021-01-12 PROCEDURE — 25010000002 MIDAZOLAM PER 1 MG: Performed by: STUDENT IN AN ORGANIZED HEALTH CARE EDUCATION/TRAINING PROGRAM

## 2021-01-12 PROCEDURE — 88304 TISSUE EXAM BY PATHOLOGIST: CPT | Performed by: SURGERY

## 2021-01-12 PROCEDURE — 11401 EXC TR-EXT B9+MARG 0.6-1 CM: CPT | Performed by: SURGERY

## 2021-01-12 PROCEDURE — 11404 EXC TR-EXT B9+MARG 3.1-4 CM: CPT | Performed by: SURGERY

## 2021-01-12 PROCEDURE — 25010000002 ONDANSETRON PER 1 MG: Performed by: ANESTHESIOLOGY

## 2021-01-12 PROCEDURE — 25010000002 FENTANYL CITRATE (PF) 100 MCG/2ML SOLUTION: Performed by: STUDENT IN AN ORGANIZED HEALTH CARE EDUCATION/TRAINING PROGRAM

## 2021-01-12 PROCEDURE — 11403 EXC TR-EXT B9+MARG 2.1-3CM: CPT | Performed by: SURGERY

## 2021-01-12 PROCEDURE — 11402 EXC TR-EXT B9+MARG 1.1-2 CM: CPT | Performed by: SURGERY

## 2021-01-12 PROCEDURE — 25010000002 PROPOFOL 10 MG/ML EMULSION: Performed by: STUDENT IN AN ORGANIZED HEALTH CARE EDUCATION/TRAINING PROGRAM

## 2021-01-12 PROCEDURE — 11406 EXC TR-EXT B9+MARG >4.0 CM: CPT | Performed by: SURGERY

## 2021-01-12 PROCEDURE — 25010000002 MIDAZOLAM PER 1 MG: Performed by: ANESTHESIOLOGY

## 2021-01-12 RX ORDER — KETOROLAC TROMETHAMINE 15 MG/ML
15 INJECTION, SOLUTION INTRAMUSCULAR; INTRAVENOUS EVERY 6 HOURS PRN
Status: DISCONTINUED | OUTPATIENT
Start: 2021-01-12 | End: 2021-01-12 | Stop reason: HOSPADM

## 2021-01-12 RX ORDER — ACETAMINOPHEN 325 MG/1
650 TABLET ORAL ONCE AS NEEDED
Status: DISCONTINUED | OUTPATIENT
Start: 2021-01-12 | End: 2021-01-12 | Stop reason: HOSPADM

## 2021-01-12 RX ORDER — NALOXONE HCL 0.4 MG/ML
0.4 VIAL (ML) INJECTION AS NEEDED
Status: DISCONTINUED | OUTPATIENT
Start: 2021-01-12 | End: 2021-01-12 | Stop reason: HOSPADM

## 2021-01-12 RX ORDER — FENTANYL CITRATE 50 UG/ML
INJECTION, SOLUTION INTRAMUSCULAR; INTRAVENOUS AS NEEDED
Status: DISCONTINUED | OUTPATIENT
Start: 2021-01-12 | End: 2021-01-12 | Stop reason: SURG

## 2021-01-12 RX ORDER — LORAZEPAM 2 MG/ML
0.5 INJECTION INTRAMUSCULAR
Status: DISCONTINUED | OUTPATIENT
Start: 2021-01-12 | End: 2021-01-12 | Stop reason: HOSPADM

## 2021-01-12 RX ORDER — PROMETHAZINE HYDROCHLORIDE 25 MG/1
25 TABLET ORAL ONCE AS NEEDED
Status: DISCONTINUED | OUTPATIENT
Start: 2021-01-12 | End: 2021-01-12 | Stop reason: HOSPADM

## 2021-01-12 RX ORDER — MORPHINE SULFATE 4 MG/ML
2 INJECTION, SOLUTION INTRAMUSCULAR; INTRAVENOUS
Status: DISCONTINUED | OUTPATIENT
Start: 2021-01-12 | End: 2021-01-12 | Stop reason: HOSPADM

## 2021-01-12 RX ORDER — PROPOFOL 10 MG/ML
VIAL (ML) INTRAVENOUS AS NEEDED
Status: DISCONTINUED | OUTPATIENT
Start: 2021-01-12 | End: 2021-01-12 | Stop reason: SURG

## 2021-01-12 RX ORDER — ONDANSETRON 2 MG/ML
4 INJECTION INTRAMUSCULAR; INTRAVENOUS ONCE AS NEEDED
Status: COMPLETED | OUTPATIENT
Start: 2021-01-12 | End: 2021-01-12

## 2021-01-12 RX ORDER — LIDOCAINE HYDROCHLORIDE 20 MG/ML
INJECTION, SOLUTION EPIDURAL; INFILTRATION; INTRACAUDAL; PERINEURAL AS NEEDED
Status: DISCONTINUED | OUTPATIENT
Start: 2021-01-12 | End: 2021-01-12 | Stop reason: SURG

## 2021-01-12 RX ORDER — MIDAZOLAM HYDROCHLORIDE 1 MG/ML
INJECTION INTRAMUSCULAR; INTRAVENOUS AS NEEDED
Status: DISCONTINUED | OUTPATIENT
Start: 2021-01-12 | End: 2021-01-12 | Stop reason: SURG

## 2021-01-12 RX ORDER — MEPERIDINE HYDROCHLORIDE 25 MG/ML
12.5 INJECTION INTRAMUSCULAR; INTRAVENOUS; SUBCUTANEOUS
Status: DISCONTINUED | OUTPATIENT
Start: 2021-01-12 | End: 2021-01-12 | Stop reason: HOSPADM

## 2021-01-12 RX ORDER — SULFAMETHOXAZOLE AND TRIMETHOPRIM 800; 160 MG/1; MG/1
1 TABLET ORAL 2 TIMES DAILY
Qty: 14 TABLET | Refills: 0 | Status: SHIPPED | OUTPATIENT
Start: 2021-01-12 | End: 2021-01-25

## 2021-01-12 RX ORDER — HYDROCODONE BITARTRATE AND ACETAMINOPHEN 7.5; 325 MG/1; MG/1
1 TABLET ORAL EVERY 4 HOURS PRN
Qty: 30 TABLET | Refills: 0 | Status: SHIPPED | OUTPATIENT
Start: 2021-01-12 | End: 2021-01-25

## 2021-01-12 RX ORDER — PROMETHAZINE HYDROCHLORIDE 25 MG/1
25 SUPPOSITORY RECTAL ONCE AS NEEDED
Status: DISCONTINUED | OUTPATIENT
Start: 2021-01-12 | End: 2021-01-12 | Stop reason: HOSPADM

## 2021-01-12 RX ORDER — SODIUM CHLORIDE 9 MG/ML
100 INJECTION, SOLUTION INTRAVENOUS CONTINUOUS
Status: DISCONTINUED | OUTPATIENT
Start: 2021-01-12 | End: 2021-01-12 | Stop reason: HOSPADM

## 2021-01-12 RX ORDER — SODIUM CHLORIDE, SODIUM LACTATE, POTASSIUM CHLORIDE, AND CALCIUM CHLORIDE .6; .31; .03; .02 G/100ML; G/100ML; G/100ML; G/100ML
20 INJECTION, SOLUTION INTRAVENOUS CONTINUOUS
Status: DISCONTINUED | OUTPATIENT
Start: 2021-01-12 | End: 2021-01-12 | Stop reason: HOSPADM

## 2021-01-12 RX ORDER — FLUMAZENIL 0.1 MG/ML
0.5 INJECTION INTRAVENOUS AS NEEDED
Status: DISCONTINUED | OUTPATIENT
Start: 2021-01-12 | End: 2021-01-12 | Stop reason: HOSPADM

## 2021-01-12 RX ORDER — HYDROCODONE BITARTRATE AND ACETAMINOPHEN 5; 325 MG/1; MG/1
1 TABLET ORAL ONCE AS NEEDED
Status: DISCONTINUED | OUTPATIENT
Start: 2021-01-12 | End: 2021-01-12 | Stop reason: HOSPADM

## 2021-01-12 RX ORDER — ACETAMINOPHEN 650 MG/1
650 SUPPOSITORY RECTAL ONCE AS NEEDED
Status: DISCONTINUED | OUTPATIENT
Start: 2021-01-12 | End: 2021-01-12 | Stop reason: HOSPADM

## 2021-01-12 RX ADMIN — CEFAZOLIN SODIUM 2 G: 1 INJECTION, POWDER, FOR SOLUTION INTRAMUSCULAR; INTRAVENOUS at 16:07

## 2021-01-12 RX ADMIN — HYDROCODONE BITARTRATE AND ACETAMINOPHEN 1 TABLET: 5; 325 TABLET ORAL at 18:07

## 2021-01-12 RX ADMIN — ONDANSETRON 4 MG: 2 INJECTION, SOLUTION INTRAMUSCULAR; INTRAVENOUS at 17:47

## 2021-01-12 RX ADMIN — FENTANYL CITRATE 100 MCG: 50 INJECTION, SOLUTION INTRAMUSCULAR; INTRAVENOUS at 16:20

## 2021-01-12 RX ADMIN — PROPOFOL 200 MG: 10 INJECTION, EMULSION INTRAVENOUS at 16:06

## 2021-01-12 RX ADMIN — SODIUM CHLORIDE, SODIUM LACTATE, POTASSIUM CHLORIDE, AND CALCIUM CHLORIDE 20 ML/HR: .6; .31; .03; .02 INJECTION, SOLUTION INTRAVENOUS at 11:03

## 2021-01-12 RX ADMIN — MIDAZOLAM 2 MG: 1 INJECTION INTRAMUSCULAR; INTRAVENOUS at 16:20

## 2021-01-12 RX ADMIN — LIDOCAINE HYDROCHLORIDE 50 MG: 20 INJECTION, SOLUTION EPIDURAL; INFILTRATION; INTRACAUDAL; PERINEURAL at 16:04

## 2021-01-12 NOTE — ANESTHESIA PROCEDURE NOTES
Airway  Urgency: elective    Date/Time: 1/12/2021 4:07 PM  Airway not difficult    General Information and Staff    Patient location during procedure: OR  Anesthesiologist: Paco Elliott MD    Indications and Patient Condition  Indications for airway management: airway protection    Preoxygenated: yes  MILS maintained throughout  Mask difficulty assessment: 1 - vent by mask    Final Airway Details  Final airway type: supraglottic airway      Successful airway: unique  Size 4    Number of attempts at approach: 1  Assessment: lips, teeth, and gum same as pre-op and atraumatic intubation

## 2021-01-12 NOTE — ANESTHESIA PREPROCEDURE EVALUATION
Anesthesia Evaluation     Patient summary reviewed and Nursing notes reviewed   NPO Solid Status: > 8 hours  NPO Liquid Status: > 8 hours           Airway   Mallampati: I  TM distance: >3 FB  Neck ROM: full  No difficulty expected  Dental - normal exam     Pulmonary - normal exam   (+) sleep apnea,   Cardiovascular - normal exam    (+) hypertension, hyperlipidemia,       Neuro/Psych  GI/Hepatic/Renal/Endo    (+)  GERD,  renal disease stones,     Musculoskeletal     Abdominal  - normal exam    Bowel sounds: normal.   Substance History      OB/GYN          Other                        Anesthesia Plan    ASA 2     general     intravenous induction     Anesthetic plan, all risks, benefits, and alternatives have been provided, discussed and informed consent has been obtained with: patient.

## 2021-01-12 NOTE — OP NOTE
MASS SOFT TISSUE EXCISION  Procedure Report    Patient Name:  Nehemias Strong Jr.  YOB: 1955    Date of Surgery:  1/12/2021     Indications: Multiple lipomas about the trunk left leg right leg right arm left arm    Pre-op Diagnosis:   Lipoma [D17.9]       Post-Op Diagnosis Codes:     * Lipoma [D17.9]    Procedure/CPT® Codes:      Procedure(s):  LIPOMA EXCISION ABDOMEN  Excision of lipomas as follows left forearm 2.5 x 1.5 cm, left forearm 2.5 x 1.5 cm, umbilicus 2.5 x 1.5 cm umbilicus 4 x 4.5 cm, left of umbilicus 1.5 x 1.0 cm, left abdomen 3.75 x 3.0 cm, left upper quadrant 5.0 x 3.0 cm, left lateral upper abdomen 7.0 x 7.0 cm, left lower lateral abdominal wall 3.0 x 1.25 cm, left groin 2.5 x 1.5 cm, left trochanter 4.5 x 4.0 cm, left trochanter number two 1.0 x 2.0 cm, right umbilicus 3.0 x 2.25 cm, right lower quadrant 5.0 x 4.0 cm right lateral upper thigh 5.0 x 3.0 cm right posterior upper arm 5.75 x 4.75 cm    A total of 16 lipomas were removed  Staff:  Surgeon(s):  Williams Stafford DO    Assistant: Rosey Sorto APRN    Anesthesia: General    Anesthetist: Dr. Elliott    Estimated Blood Loss: minimal    Implants:    Nothing was implanted during the procedure    Specimen:          Specimens     ID Source Type Tests Collected By Collected At [facility]?      A Forearm, Left Tissue · TISSUE PATHOLOGY EXAM   Williams Stafford, DO 1/12/21 1629      Description: 2.5 x 1.5 lipoma    B Forearm, Left Tissue · TISSUE PATHOLOGY EXAM   Williams Stafford, DO 1/12/21 1630      Description: second left foerarm is 2.5x1.5    C Umbilicus Tissue · TISSUE PATHOLOGY EXAM   Williams Stafford, DO 1/12/21 1631      Description: lipoma above umbilicus=2.5x1.5    D Umbilicus Tissue · TISSUE PATHOLOGY EXAM   Williams Stafford, DO 1/12/21 1636      Description: lipoma t umbilicus=4x4.5    E Umbilicus Tissue · TISSUE PATHOLOGY EXAM   RiveraWilliams sargent FAIZAN, DO 1/12/21 6158      Description: left of umbilicus lipoma=1.5x1    F Abdomen,  Left Tissue · TISSUE PATHOLOGY EXAM   Williams Stafford, DO 1/12/21 1638      Description: left upper quad lipoma=3.75x3    G Abdomen, Left Tissue · TISSUE PATHOLOGY EXAM   Williams Stafford, DO 1/12/21 1640      Description: left upper quad #2 lipoma=5x3    H Abdomen, Left Tissue · TISSUE PATHOLOGY EXAM   Williams Stafford, DO 1/12/21 1641      Description: left lateral upper abdomen lipoma=7x7    I Abdomen, Left Tissue · TISSUE PATHOLOGY EXAM   Williams Stafford, DO 1/12/21 1642      Description: left lower lateral abdoemn lipoma=3x1.25    J Groin, left Tissue · TISSUE PATHOLOGY EXAM   Williams Stafford, DO 1/12/21 1646      Description: lipoma=2.5x1.5    K Leg, Left Tissue · TISSUE PATHOLOGY EXAM   Williams Stafford, DO 1/12/21 1647      Description: left trochanter lipoma=4x4.5    L Leg, Left Tissue · TISSUE PATHOLOGY EXAM   Williams Stafford, DO 1/12/21 1651      Description: left trochanter #2 lipoma=1x2    M Abdomen, Right Tissue · TISSUE PATHOLOGY EXAM   Williams Stafford, DO 1/12/21 1652      Description: right umbilicus lipoma=3x2.25    N Abdomen, Right Tissue · TISSUE PATHOLOGY EXAM   Williams Stafford, DO 1/12/21 1655      Description: right lower quad=5x4    O Leg, Right Tissue · TISSUE PATHOLOGY EXAM   Williams Stafford, DO 1/12/21 1719      Description: right lateral upper thigh lipoma=5x3    P Arm, Right Tissue · TISSUE PATHOLOGY EXAM   Williams Stafford, DO 1/12/21 1721      Description: posterior upper arm lipoma=5.75x4.75              Findings: Multiple subcutaneous lipomas as above    Complications: None    Description of Procedure: Mr. Buchanan is a pleasant 65-year-old seen in the office with complaint of multiple subcutaneous lipomas.  These involve the left forearm the trunk the left lower extremity right lower extremity right arm.  He is wanting them removed.  They are tender.  Some are quite sizable and loading rubs and irritates them.  We have marked the mall in the holding area preoperatively.  Because of the  size and number of these of the skin will be closed using a skin stapler.    Patient was taken operating room placed in the supine position.  General was done by Dr. Elliott.  Timeout done identity verified.  The trunk left thigh left arm/forearm were all prepped and draped after 3-minute dry time.  Overlying each lipoma an incision was made through the skin and then carried deeper with cautery to the lipoma proper and using a combination of blunt and hemostat dissection the lipomas were popped up and the base of them was excised using the cautery.  This was done in all 16 locations in similar fashion as outlined above.  After each lipoma was removed hemostasis was assured with pinpoint cautery and then the skin was reapproximated with a stapler and 1% lidocaine with epinephrine was used for topical pain control.  Total of 80 cc of 1% lidocaine with epi was used.  Each of the lipoma excision site was covered with a sterile dressing.  After all 16 were removed he was awakened and transferred to recovery in satisfactory condition.  The final sponge instrument and needle counts were correct.  He tolerated the procedure well.    Assistant: Rosey Sorto APRN  was responsible for performing the following activities: Retraction, Closing and Placing Dressing and their skilled assistance was necessary for the success of this case.    Williams Stafford,      Date: 1/12/2021  Time: 17:44 EST

## 2021-01-12 NOTE — ANESTHESIA POSTPROCEDURE EVALUATION
Patient: Nehemias Strong Jr.    Procedure Summary     Date: 01/12/21 Room / Location: [facility] OR 06 / [facility] MAIN OR    Anesthesia Start: 1603 Anesthesia Stop: 1738    Procedure: LIPOMA EXCISION ABDOMEN (N/A ) Diagnosis:       Lipoma      (Lipoma [D17.9])    Surgeon: Williams Stafford DO Provider: Paco Elliott MD    Anesthesia Type: general ASA Status: 2          Anesthesia Type: general    Vitals  Vitals Value Taken Time   /79 01/12/21 1739   Temp     Pulse 60 01/12/21 1741   Resp     SpO2 95 % 01/12/21 1741   Vitals shown include unvalidated device data.        Post Anesthesia Care and Evaluation    Patient location during evaluation: bedside  Patient participation: complete - patient participated  Level of consciousness: awake and alert  Pain score: 1  Pain management: adequate  Airway patency: patent  Anesthetic complications: No anesthetic complications  PONV Status: none  Cardiovascular status: acceptable  Respiratory status: acceptable  Hydration status: acceptable  Post Neuraxial Block status: Motor and sensory function returned to baseline

## 2021-01-14 LAB
LAB AP CASE REPORT: NORMAL
PATH REPORT.FINAL DX SPEC: NORMAL
PATH REPORT.GROSS SPEC: NORMAL

## 2021-01-24 RX ORDER — LEVOFLOXACIN 750 MG/1
750 TABLET ORAL DAILY
Qty: 7 TABLET | Refills: 0 | Status: SHIPPED | OUTPATIENT
Start: 2021-01-24 | End: 2021-02-01

## 2021-01-24 RX ORDER — SULFAMETHOXAZOLE AND TRIMETHOPRIM 400; 80 MG/1; MG/1
1 TABLET ORAL EVERY 12 HOURS SCHEDULED
Status: ACTIVE | OUTPATIENT
Start: 2021-01-24 | End: 2021-01-31

## 2021-01-25 ENCOUNTER — OFFICE VISIT (OUTPATIENT)
Dept: SURGERY | Facility: CLINIC | Age: 66
End: 2021-01-25

## 2021-01-25 VITALS
HEART RATE: 58 BPM | WEIGHT: 218 LBS | TEMPERATURE: 98 F | DIASTOLIC BLOOD PRESSURE: 90 MMHG | HEIGHT: 73 IN | BODY MASS INDEX: 28.89 KG/M2 | SYSTOLIC BLOOD PRESSURE: 145 MMHG | OXYGEN SATURATION: 98 %

## 2021-01-25 DIAGNOSIS — D17.1 LIPOMA OF TORSO: Primary | ICD-10-CM

## 2021-01-25 PROCEDURE — 99024 POSTOP FOLLOW-UP VISIT: CPT | Performed by: SURGERY

## 2021-01-25 NOTE — PROGRESS NOTES
SUBJECTIVE:    Mr. Buchanan is a pleasant 65-year-old seen in in follow-up from removal of the multiple lipomas from the arm legs and trunk.  Total of 16 lipomas were removed.    OBJECTIVE:    All incisions appear to be healing up.  He thought that the hip might be infected but it grossly has no drainage some mild erythema.  Do not think it is infected.    ASSESSMENT:    Satisfactory postop progress    PLAN:    We will hold off removing the staples for another week.  We will see him back in the office next Monday.

## 2021-02-01 ENCOUNTER — OFFICE VISIT (OUTPATIENT)
Dept: SURGERY | Facility: CLINIC | Age: 66
End: 2021-02-01

## 2021-02-01 VITALS
BODY MASS INDEX: 29.03 KG/M2 | OXYGEN SATURATION: 96 % | WEIGHT: 219 LBS | HEART RATE: 73 BPM | HEIGHT: 73 IN | SYSTOLIC BLOOD PRESSURE: 135 MMHG | DIASTOLIC BLOOD PRESSURE: 84 MMHG | TEMPERATURE: 97.1 F

## 2021-02-01 DIAGNOSIS — D17.1 LIPOMA OF TORSO: Primary | ICD-10-CM

## 2021-02-01 PROCEDURE — 99024 POSTOP FOLLOW-UP VISIT: CPT | Performed by: SURGERY

## 2021-02-01 NOTE — PROGRESS NOTES
SUBJECTIVE:    Nehemias is seen in the office today follow-up from removal of the multiple lipomas from both arms both hips and anterior abdominal wall.  Total of 16 lipomas were removed.    OBJECTIVE:    All areas were inspected.  Staples are removed.  The areas of the left hip have some separation of the wound edges.  They will heal by second intention.  None are infected.    ASSESSMENT:    Satisfactory postop progress    PLAN:    Recheck in the office as needed

## 2021-03-27 DIAGNOSIS — I10 ESSENTIAL HYPERTENSION: ICD-10-CM

## 2021-03-29 RX ORDER — LISINOPRIL 10 MG/1
10 TABLET ORAL DAILY
Qty: 90 TABLET | Refills: 1 | Status: SHIPPED | OUTPATIENT
Start: 2021-03-29 | End: 2021-09-07 | Stop reason: SDUPTHER

## 2021-04-21 ENCOUNTER — OFFICE VISIT (OUTPATIENT)
Dept: FAMILY MEDICINE CLINIC | Facility: CLINIC | Age: 66
End: 2021-04-21

## 2021-04-21 ENCOUNTER — LAB (OUTPATIENT)
Dept: FAMILY MEDICINE CLINIC | Facility: CLINIC | Age: 66
End: 2021-04-21

## 2021-04-21 VITALS
HEIGHT: 73 IN | TEMPERATURE: 96.9 F | BODY MASS INDEX: 28.92 KG/M2 | HEART RATE: 58 BPM | SYSTOLIC BLOOD PRESSURE: 120 MMHG | DIASTOLIC BLOOD PRESSURE: 80 MMHG | OXYGEN SATURATION: 98 % | WEIGHT: 218.2 LBS

## 2021-04-21 DIAGNOSIS — Z11.59 NEED FOR HEPATITIS C SCREENING TEST: ICD-10-CM

## 2021-04-21 DIAGNOSIS — M67.442 GANGLION CYST OF JOINT OF FINGER OF LEFT HAND: ICD-10-CM

## 2021-04-21 DIAGNOSIS — Z00.00 PREVENTATIVE HEALTH CARE: ICD-10-CM

## 2021-04-21 DIAGNOSIS — Z12.5 SCREENING FOR PROSTATE CANCER: ICD-10-CM

## 2021-04-21 DIAGNOSIS — Z00.00 PREVENTATIVE HEALTH CARE: Primary | ICD-10-CM

## 2021-04-21 LAB
ALBUMIN SERPL-MCNC: 4.3 G/DL (ref 3.5–5.2)
ALBUMIN/GLOB SERPL: 1.7 G/DL
ALP SERPL-CCNC: 72 U/L (ref 39–117)
ALT SERPL W P-5'-P-CCNC: 28 U/L (ref 1–41)
ANION GAP SERPL CALCULATED.3IONS-SCNC: 11.2 MMOL/L (ref 5–15)
AST SERPL-CCNC: 18 U/L (ref 1–40)
BASOPHILS # BLD AUTO: 0.02 10*3/MM3 (ref 0–0.2)
BASOPHILS NFR BLD AUTO: 0.4 % (ref 0–1.5)
BILIRUB SERPL-MCNC: 0.5 MG/DL (ref 0–1.2)
BUN SERPL-MCNC: 14 MG/DL (ref 8–23)
BUN/CREAT SERPL: 11.4 (ref 7–25)
CALCIUM SPEC-SCNC: 9 MG/DL (ref 8.6–10.5)
CHLORIDE SERPL-SCNC: 103 MMOL/L (ref 98–107)
CHOLEST SERPL-MCNC: 188 MG/DL (ref 0–200)
CO2 SERPL-SCNC: 26.8 MMOL/L (ref 22–29)
CREAT SERPL-MCNC: 1.23 MG/DL (ref 0.76–1.27)
DEPRECATED RDW RBC AUTO: 40.1 FL (ref 37–54)
EOSINOPHIL # BLD AUTO: 0.09 10*3/MM3 (ref 0–0.4)
EOSINOPHIL NFR BLD AUTO: 1.8 % (ref 0.3–6.2)
ERYTHROCYTE [DISTWIDTH] IN BLOOD BY AUTOMATED COUNT: 12.6 % (ref 12.3–15.4)
GFR SERPL CREATININE-BSD FRML MDRD: 59 ML/MIN/1.73
GLOBULIN UR ELPH-MCNC: 2.5 GM/DL
GLUCOSE SERPL-MCNC: 84 MG/DL (ref 65–99)
HCT VFR BLD AUTO: 46.4 % (ref 37.5–51)
HCV AB SER DONR QL: NORMAL
HDLC SERPL-MCNC: 44 MG/DL (ref 40–60)
HGB BLD-MCNC: 15.6 G/DL (ref 13–17.7)
IMM GRANULOCYTES # BLD AUTO: 0.01 10*3/MM3 (ref 0–0.05)
IMM GRANULOCYTES NFR BLD AUTO: 0.2 % (ref 0–0.5)
LDLC SERPL CALC-MCNC: 122 MG/DL (ref 0–100)
LDLC/HDLC SERPL: 2.71 {RATIO}
LYMPHOCYTES # BLD AUTO: 1.89 10*3/MM3 (ref 0.7–3.1)
LYMPHOCYTES NFR BLD AUTO: 37.1 % (ref 19.6–45.3)
MCH RBC QN AUTO: 29.7 PG (ref 26.6–33)
MCHC RBC AUTO-ENTMCNC: 33.6 G/DL (ref 31.5–35.7)
MCV RBC AUTO: 88.2 FL (ref 79–97)
MONOCYTES # BLD AUTO: 0.47 10*3/MM3 (ref 0.1–0.9)
MONOCYTES NFR BLD AUTO: 9.2 % (ref 5–12)
NEUTROPHILS NFR BLD AUTO: 2.61 10*3/MM3 (ref 1.7–7)
NEUTROPHILS NFR BLD AUTO: 51.3 % (ref 42.7–76)
NRBC BLD AUTO-RTO: 0.2 /100 WBC (ref 0–0.2)
PLATELET # BLD AUTO: 199 10*3/MM3 (ref 140–450)
PMV BLD AUTO: 9.9 FL (ref 6–12)
POTASSIUM SERPL-SCNC: 4.1 MMOL/L (ref 3.5–5.2)
PROT SERPL-MCNC: 6.8 G/DL (ref 6–8.5)
RBC # BLD AUTO: 5.26 10*6/MM3 (ref 4.14–5.8)
SODIUM SERPL-SCNC: 141 MMOL/L (ref 136–145)
TRIGL SERPL-MCNC: 124 MG/DL (ref 0–150)
TSH SERPL DL<=0.05 MIU/L-ACNC: 1.13 UIU/ML (ref 0.27–4.2)
VLDLC SERPL-MCNC: 22 MG/DL (ref 5–40)
WBC # BLD AUTO: 5.09 10*3/MM3 (ref 3.4–10.8)

## 2021-04-21 PROCEDURE — 80061 LIPID PANEL: CPT | Performed by: NURSE PRACTITIONER

## 2021-04-21 PROCEDURE — 36415 COLL VENOUS BLD VENIPUNCTURE: CPT

## 2021-04-21 PROCEDURE — 85025 COMPLETE CBC W/AUTO DIFF WBC: CPT | Performed by: NURSE PRACTITIONER

## 2021-04-21 PROCEDURE — 84443 ASSAY THYROID STIM HORMONE: CPT | Performed by: NURSE PRACTITIONER

## 2021-04-21 PROCEDURE — 86803 HEPATITIS C AB TEST: CPT | Performed by: NURSE PRACTITIONER

## 2021-04-21 PROCEDURE — 99396 PREV VISIT EST AGE 40-64: CPT | Performed by: NURSE PRACTITIONER

## 2021-04-21 PROCEDURE — 80053 COMPREHEN METABOLIC PANEL: CPT | Performed by: NURSE PRACTITIONER

## 2021-04-21 NOTE — PROGRESS NOTES
"Subjective   Nehemias Strong Jr. is a 65 y.o. male.     Chief Complaint   Patient presents with   • Annual Exam       /80 (BP Location: Left arm, Patient Position: Sitting, Cuff Size: Adult)   Pulse 58   Temp 96.9 °F (36.1 °C) (Skin)   Ht 185.4 cm (73\")   Wt 99 kg (218 lb 3.2 oz)   SpO2 98%   BMI 28.79 kg/m²     BP Readings from Last 3 Encounters:   04/21/21 120/80   02/01/21 135/84   01/25/21 145/90       Wt Readings from Last 3 Encounters:   04/21/21 99 kg (218 lb 3.2 oz)   02/01/21 99.3 kg (219 lb)   01/25/21 98.9 kg (218 lb)       Pt comes in today for routine physical.   Looking to retire from [employer] on July 31.   Left index finger with ganglion cyst at PIP. Starting to cause some discomfort.     Sees Dr. Travis for prostate checks and hx of kidney stones  Sees Dr. Smith (EP) for hx of bradycardia   Sees Dr. PRATHER (ENT) for thyroid nodule  UTD on colonoscopy. Due in 2025   Has received both covid vaccines          Past Surgical History:   Procedure Laterality Date   • COLONOSCOPY     • DENTAL PROCEDURE     • HERNIA REPAIR Right     inguinal w/ Lilia   • MASS EXCISION N/A 1/12/2021    Procedure: lipoma excisions to abdomen, bilateral upper lateral thighs and bialteral arms;  Surgeon: Williams Stafford DO;  Location: [facility] MAIN OR;  Service: General;  Laterality: N/A;       Social History     Socioeconomic History   • Marital status:      Spouse name: Not on file   • Number of children: 7   • Years of education: Not on file   • Highest education level: Not on file   Tobacco Use   • Smoking status: Never Smoker   • Smokeless tobacco: Never Used   Vaping Use   • Vaping Use: Never used   Substance and Sexual Activity   • Alcohol use: No   • Drug use: Never   • Sexual activity: Yes     Partners: Female         The following portions of the patient's history were reviewed and updated as appropriate: allergies, current medications, past family history, past medical history, past social history, " past surgical history and problem list.    Review of Systems   Constitutional: Negative for activity change, unexpected weight gain and unexpected weight loss.   Eyes: Negative for visual disturbance.   Respiratory: Negative for chest tightness and shortness of breath.    Cardiovascular: Negative for chest pain, palpitations and leg swelling.   Gastrointestinal: Negative for abdominal pain, blood in stool, constipation, diarrhea and indigestion.   Endocrine: Negative for polydipsia and polyuria.   Genitourinary: Negative for difficulty urinating.   Skin: Negative for skin lesions.   Neurological: Negative for headache.   Psychiatric/Behavioral: Negative for agitation, sleep disturbance and stress.       Objective   Physical Exam  Constitutional:       Appearance: He is well-developed.   HENT:      Head: Normocephalic.   Eyes:      Conjunctiva/sclera: Conjunctivae normal.      Pupils: Pupils are equal, round, and reactive to light.   Neck:      Thyroid: No thyromegaly.   Cardiovascular:      Rate and Rhythm: Normal rate and regular rhythm.      Heart sounds: No murmur heard.     Pulmonary:      Effort: Pulmonary effort is normal.      Breath sounds: Normal breath sounds.   Abdominal:      General: Bowel sounds are normal.      Palpations: Abdomen is soft.      Tenderness: There is no abdominal tenderness.   Musculoskeletal:         General: Normal range of motion.        Hands:       Cervical back: Normal range of motion and neck supple.   Skin:     General: Skin is warm and dry.      Findings: No lesion.   Neurological:      Mental Status: He is alert and oriented to person, place, and time.   Psychiatric:         Behavior: Behavior normal.         Diagnoses and all orders for this visit:    1. Preventative health care (Primary)  -     Hepatitis C Antibody; Future  -     Comprehensive Metabolic Panel; Future  -     CBC & Differential; Future  -     Lipid Panel; Future  -     TSH; Future  -     Cancel: PSA Screen;  Future    2. Need for hepatitis C screening test  -     Hepatitis C Antibody; Future    3. Screening for prostate cancer  -     Cancel: PSA Screen; Future    4. Ganglion cyst of joint of finger of left hand  -     Ambulatory Referral to Hand Surgery    check labs  [facility]  During this visit for their annual exam, we reviewed their personal history, social history and family history. We went over their medications and all the recommended health maintenance items for their age group. They were given the opportunity to ask questions and discuss other concerns.       Return in about 1 year (around 4/21/2022) for Annual physical.

## 2021-05-10 ENCOUNTER — TELEPHONE (OUTPATIENT)
Dept: FAMILY MEDICINE CLINIC | Facility: CLINIC | Age: 66
End: 2021-05-10

## 2021-05-10 NOTE — TELEPHONE ENCOUNTER
TO Nehemias Charlton Jr. ([facility]) [phone] (M)     PATIENT CALLED WANTING TO SCHEDULE A FAA MEDICAL- FLIGHT PHYSICAL    ADVISED TO SEND T/E

## 2021-05-12 RX ORDER — AZITHROMYCIN 500 MG/1
500 TABLET, FILM COATED ORAL DAILY
Qty: 3 TABLET | Refills: 0 | Status: SHIPPED | OUTPATIENT
Start: 2021-05-12 | End: 2021-06-01

## 2021-06-01 ENCOUNTER — OFFICE VISIT (OUTPATIENT)
Dept: FAMILY MEDICINE CLINIC | Facility: CLINIC | Age: 66
End: 2021-06-01

## 2021-06-01 VITALS
OXYGEN SATURATION: 99 % | BODY MASS INDEX: 28.23 KG/M2 | HEIGHT: 73 IN | DIASTOLIC BLOOD PRESSURE: 67 MMHG | TEMPERATURE: 97.3 F | WEIGHT: 213 LBS | HEART RATE: 63 BPM | RESPIRATION RATE: 20 BRPM | SYSTOLIC BLOOD PRESSURE: 104 MMHG

## 2021-06-01 DIAGNOSIS — Z02.89 ENCOUNTER FOR FEDERAL AVIATION ADMINISTRATION (FAA) EXAMINATION: Primary | ICD-10-CM

## 2021-06-01 PROCEDURE — FAA1PHY: Performed by: FAMILY MEDICINE

## 2021-06-01 PROCEDURE — 93000 ELECTROCARDIOGRAM COMPLETE: CPT | Performed by: FAMILY MEDICINE

## 2021-06-01 NOTE — ASSESSMENT & PLAN NOTE
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  Ua done today was normal. Vision 20/20 corrected-bilaterally. Forms electronically filed to the FAA.  EKG done today and read by myself shows normal sinus rhythm with mild conduction delay-stable from previous.,

## 2021-06-01 NOTE — PROGRESS NOTES
Subjective   Nehemias Strong Jr. is a 65 y.o. male.     Patient is here for a 1st class flight physical examination. He is retiring 7-31-21 from [employer]       The following portions of the patient's history were reviewed and updated as appropriate: current medications, past family history, past medical history, past social history, past surgical history and problem list.    Family History   Problem Relation Age of Onset   • Lung cancer Mother         large cell   • No Known Problems Father    • Birth defects Brother         heart defect at age 6   • No Known Problems Maternal Grandmother    • No Known Problems Maternal Grandfather    • No Known Problems Paternal Grandmother    • No Known Problems Paternal Grandfather    • No Known Problems Sister        Social History     Tobacco Use   • Smoking status: Never Smoker   • Smokeless tobacco: Never Used   Vaping Use   • Vaping Use: Never used   Substance Use Topics   • Alcohol use: No   • Drug use: Never       Past Surgical History:   Procedure Laterality Date   • COLONOSCOPY     • DENTAL PROCEDURE     • HERNIA REPAIR Right     inguinal w/ Lilia   • MASS EXCISION N/A 1/12/2021    Procedure: lipoma excisions to abdomen, bilateral upper lateral thighs and bialteral arms;  Surgeon: Williams Stafford DO;  Location: [facility] OR;  Service: General;  Laterality: N/A;       Patient Active Problem List   Diagnosis   • Vitamin D deficiency   • Migraine headache   • Kidney stones   • Idiopathic cyst of iris, ciliary body, or anterior chamber   • Hyperlipidemia   • Gastroesophageal reflux disease   • Enlarged prostate   • Benign lipomatous neoplasm of skin and subcutaneous tissue   • Bradycardia   • Cardiac arrhythmia   • Left lower lobe pneumonia   • Benign neoplasm of skin of eyelid   • Family history of lung cancer   • Need for other prophylactic vaccination and inoculation against single diseases   • Odynophagia   • Plantar fasciitis   • Rectal prolapse   • Thyroid nodule  "  • Encounter for Federal Aviation Administration (FAA) examination   • Hypertension   • Lipoma of torso   • Lipoma       Current Outpatient Medications on File Prior to Visit   Medication Sig Dispense Refill   • aspirin 81 MG chewable tablet Chew 81 mg Daily.     • famotidine (PEPCID) 40 MG tablet Take 40 mg by mouth every night at bedtime.     • glucosamine-chondroitin 500-400 MG capsule capsule Take 1 capsule by mouth 3 (Three) Times a Day With Meals.     • lisinopril (PRINIVIL,ZESTRIL) 10 MG tablet TAKE 1 TABLET BY MOUTH DAILY 90 tablet 1   • Multiple Vitamin (MULTIVITAMIN) capsule Take 1 capsule by mouth Daily.     • mupirocin (BACTROBAN) 2 % ointment APPLY TOPICALLY TO AFFECTED AREA TWICE DAILY     • RA NASAL ALLERGY 55 MCG/ACT nasal inhaler 2 sprays Daily.       No current facility-administered medications on file prior to visit.       No Known Allergies    Review of Systems    Objective   Visit Vitals  /67   Pulse 63   Temp 97.3 °F (36.3 °C)   Resp 20   Ht 185.4 cm (72.99\")   Wt 96.6 kg (213 lb)   SpO2 99%   BMI 28.11 kg/m²     Physical Exam  Vitals and nursing note reviewed.   Constitutional:       General: He is not in acute distress.     Appearance: He is well-developed. He is not diaphoretic.   HENT:      Head: Normocephalic and atraumatic.      Right Ear: External ear normal.      Left Ear: External ear normal.      Mouth/Throat:      Pharynx: No oropharyngeal exudate.   Eyes:      General: Lids are normal. Lids are everted, no foreign bodies appreciated.         Right eye: No discharge.         Left eye: No discharge.      Conjunctiva/sclera: Conjunctivae normal.      Right eye: Right conjunctiva is not injected. No exudate.     Left eye: Left conjunctiva is not injected. No exudate.     Pupils: Pupils are equal, round, and reactive to light.      Funduscopic exam:     Right eye: No hemorrhage, exudate, AV nicking or papilledema.         Left eye: No hemorrhage, exudate, AV nicking or " papilledema.      Comments: Visual field normal to 90 degrees bilat   Neck:      Thyroid: No thyromegaly.   Cardiovascular:      Rate and Rhythm: Normal rate and regular rhythm.      Heart sounds: Normal heart sounds. No murmur heard.   No friction rub. No gallop.    Pulmonary:      Effort: Pulmonary effort is normal. No respiratory distress.      Breath sounds: Normal breath sounds. No wheezing.   Chest:      Chest wall: No tenderness.   Abdominal:      General: Bowel sounds are normal. There is no distension.      Palpations: Abdomen is soft.      Tenderness: There is no abdominal tenderness. There is no guarding or rebound.      Hernia: No hernia is present.   Genitourinary:     Comments: Penis normal  testicle descended bilataral without abnorm  Musculoskeletal:         General: No tenderness or deformity. Normal range of motion.      Cervical back: Normal range of motion and neck supple.   Lymphadenopathy:      Cervical: No cervical adenopathy.      Right cervical: No superficial cervical adenopathy.     Left cervical: No superficial cervical adenopathy.   Skin:     General: Skin is warm and dry.      Findings: No erythema or rash.   Neurological:      Mental Status: He is alert and oriented to person, place, and time.      Cranial Nerves: No cranial nerve deficit.      Sensory: No sensory deficit.      Motor: No abnormal muscle tone.      Coordination: Coordination normal.      Gait: Gait normal.      Deep Tendon Reflexes: Reflexes normal.   Psychiatric:         Behavior: Behavior normal.         Thought Content: Thought content normal.         Judgment: Judgment normal.           Assessment/Plan .  Problem List Items Addressed This Visit        Unprioritized    Encounter for Federal Aviation Administration (FAA) examination - Primary    Current Assessment & Plan     --Patient passed the 1st class flight examination.  Ua done today was normal. Vision 20/20 corrected-bilaterally. Forms electronically filed to  the FAA.  EKG done today and read by myself shows normal sinus rhythm with mild conduction delay-stable from previous.,

## 2021-06-07 PROCEDURE — 87591 N.GONORRHOEAE DNA AMP PROB: CPT | Performed by: NURSE PRACTITIONER

## 2021-06-07 PROCEDURE — 87491 CHLMYD TRACH DNA AMP PROBE: CPT | Performed by: NURSE PRACTITIONER

## 2021-06-16 ENCOUNTER — OFFICE VISIT (OUTPATIENT)
Dept: FAMILY MEDICINE CLINIC | Facility: CLINIC | Age: 66
End: 2021-06-16

## 2021-06-16 VITALS
RESPIRATION RATE: 16 BRPM | OXYGEN SATURATION: 98 % | HEIGHT: 73 IN | HEART RATE: 66 BPM | SYSTOLIC BLOOD PRESSURE: 140 MMHG | TEMPERATURE: 97.7 F | DIASTOLIC BLOOD PRESSURE: 86 MMHG | WEIGHT: 217 LBS | BODY MASS INDEX: 28.76 KG/M2

## 2021-06-16 DIAGNOSIS — B37.9 YEAST INFECTION: Primary | ICD-10-CM

## 2021-06-16 PROCEDURE — 99213 OFFICE O/P EST LOW 20 MIN: CPT | Performed by: FAMILY MEDICINE

## 2021-08-18 NOTE — TELEPHONE ENCOUNTER
Please fax office notes from Aug regarding pneumonia and/or URI and any meds from then as well to Fax # [phone]   [facility] ETA 2030. Pt updated.

## 2021-08-23 ENCOUNTER — OFFICE VISIT (OUTPATIENT)
Dept: FAMILY MEDICINE CLINIC | Facility: CLINIC | Age: 66
End: 2021-08-23

## 2021-08-23 ENCOUNTER — LAB (OUTPATIENT)
Dept: FAMILY MEDICINE CLINIC | Facility: CLINIC | Age: 66
End: 2021-08-23

## 2021-08-23 VITALS
BODY MASS INDEX: 28.23 KG/M2 | OXYGEN SATURATION: 98 % | HEART RATE: 77 BPM | DIASTOLIC BLOOD PRESSURE: 80 MMHG | SYSTOLIC BLOOD PRESSURE: 138 MMHG | TEMPERATURE: 98.3 F | WEIGHT: 213 LBS | HEIGHT: 73 IN

## 2021-08-23 DIAGNOSIS — J06.9 ACUTE URI: Primary | ICD-10-CM

## 2021-08-23 DIAGNOSIS — J02.8 SORE THROAT (VIRAL): ICD-10-CM

## 2021-08-23 DIAGNOSIS — B97.89 SORE THROAT (VIRAL): ICD-10-CM

## 2021-08-23 LAB
B PARAPERT DNA SPEC QL NAA+PROBE: NOT DETECTED
B PERT DNA SPEC QL NAA+PROBE: NOT DETECTED
C PNEUM DNA NPH QL NAA+NON-PROBE: NOT DETECTED
EXPIRATION DATE: NORMAL
FLUAV SUBTYP SPEC NAA+PROBE: NOT DETECTED
FLUBV RNA ISLT QL NAA+PROBE: NOT DETECTED
HADV DNA SPEC NAA+PROBE: NOT DETECTED
HCOV 229E RNA SPEC QL NAA+PROBE: NOT DETECTED
HCOV HKU1 RNA SPEC QL NAA+PROBE: NOT DETECTED
HCOV NL63 RNA SPEC QL NAA+PROBE: NOT DETECTED
HCOV OC43 RNA SPEC QL NAA+PROBE: NOT DETECTED
HMPV RNA NPH QL NAA+NON-PROBE: NOT DETECTED
HPIV1 RNA SPEC QL NAA+PROBE: NOT DETECTED
HPIV2 RNA SPEC QL NAA+PROBE: NOT DETECTED
HPIV3 RNA NPH QL NAA+PROBE: NOT DETECTED
HPIV4 P GENE NPH QL NAA+PROBE: NOT DETECTED
INTERNAL CONTROL: NORMAL
Lab: NORMAL
M PNEUMO IGG SER IA-ACNC: NOT DETECTED
RHINOVIRUS RNA SPEC NAA+PROBE: NOT DETECTED
RSV RNA NPH QL NAA+NON-PROBE: NOT DETECTED
S PYO AG THROAT QL: NEGATIVE
SARS-COV-2 RNA NPH QL NAA+NON-PROBE: NOT DETECTED

## 2021-08-23 PROCEDURE — U0005 INFEC AGEN DETEC AMPLI PROBE: HCPCS

## 2021-08-23 PROCEDURE — 99213 OFFICE O/P EST LOW 20 MIN: CPT | Performed by: NURSE PRACTITIONER

## 2021-08-23 PROCEDURE — 87880 STREP A ASSAY W/OPTIC: CPT | Performed by: NURSE PRACTITIONER

## 2021-08-23 PROCEDURE — 0202U NFCT DS 22 TRGT SARS-COV-2: CPT | Performed by: NURSE PRACTITIONER

## 2021-08-23 PROCEDURE — C9803 HOPD COVID-19 SPEC COLLECT: HCPCS

## 2021-08-23 RX ORDER — BENZONATATE 200 MG/1
200 CAPSULE ORAL 3 TIMES DAILY PRN
Qty: 30 CAPSULE | Refills: 1 | Status: SHIPPED | OUTPATIENT
Start: 2021-08-23 | End: 2021-09-05

## 2021-08-23 NOTE — PROGRESS NOTES
"Chief Complaint  Cough, Sore Throat, and Fever  Subjective        Nehemias Strong Jr. presents to [facility]  Pt comes in today with c/o sore throat, fever, cough, chest congestion. No nasal congestion. No loss of taste or smell. No N/V. Has had some loose stools. +HA. + body aches.   Taking tylenol TID  Tmax 100.2.   Grandson tested positive for RSV.   [place] [facility] [place].  Symptoms started on Thursday 8/19       Objective     Vital Signs:   /80 (BP Location: Left arm, Patient Position: Sitting, Cuff Size: Adult)   Pulse 77   Temp 98.3 °F (36.8 °C) (Oral)   Ht 185.4 cm (73\")   Wt 96.6 kg (213 lb)   SpO2 98%   BMI 28.10 kg/m²       BP Readings from Last 3 Encounters:   08/23/21 138/80   08/20/21 139/87   06/16/21 140/86       Wt Readings from Last 3 Encounters:   08/23/21 96.6 kg (213 lb)   06/16/21 98.4 kg (217 lb)   06/13/21 96.6 kg (213 lb)     Physical Exam  Constitutional:       Appearance: He is well-developed.   HENT:      Right Ear: Tympanic membrane normal.      Nose: No congestion.      Mouth/Throat:      Mouth: Mucous membranes are moist.      Pharynx: No oropharyngeal exudate or posterior oropharyngeal erythema.   Eyes:      Pupils: Pupils are equal, round, and reactive to light.   Cardiovascular:      Rate and Rhythm: Normal rate and regular rhythm.   Pulmonary:      Effort: Pulmonary effort is normal. No respiratory distress.      Breath sounds: Normal breath sounds. No wheezing or rhonchi.   Neurological:      Mental Status: He is alert and oriented to person, place, and time.         Assessment and Plan    Diagnoses and all orders for this visit:    1. Acute URI (Primary)  -     benzonatate (TESSALON) 200 MG capsule; Take 1 capsule by mouth 3 (Three) Times a Day As Needed for Cough for up to 13 days.  Dispense: 30 capsule; Refill: 1  -     Respiratory Panel PCR w/COVID-19(SARS-CoV-2) LIZETH/SCOTT/JUAN/PAD/COR/MAD/IKER In-House, " NP Swab in UTM/VTM, 3-4 HR TAT - Swab, Nasopharynx; Future  -     Respiratory Panel PCR w/COVID-19(SARS-CoV-2) LIZETH/SCOTT/JUAN/PAD/COR/MAD/IKER In-House, NP Swab in UTM/VTM, 3-4 HR TAT - Swab, Nasopharynx    2. Sore throat (viral)  -     benzonatate (TESSALON) 200 MG capsule; Take 1 capsule by mouth 3 (Three) Times a Day As Needed for Cough for up to 13 days.  Dispense: 30 capsule; Refill: 1  -     Respiratory Panel PCR w/COVID-19(SARS-CoV-2) LIZETH/SCOTT/JUAN/PAD/COR/MAD/IKER In-House, NP Swab in UTM/VTM, 3-4 HR TAT - Swab, Nasopharynx; Future  -     Respiratory Panel PCR w/COVID-19(SARS-CoV-2) LIZETH/SCOTT/JUAN/PAD/COR/MAD/IKER In-House, NP Swab in UTM/VTM, 3-4 HR TAT - Swab, Nasopharynx  -     POCT rapid strep A    RSS neg  Will check resp panel  benzonate for cough  May consider anbx pending test results  Tylenol/IBU otc  Push fluids  During this office visit, we discussed the pertinent aspects of the visit and treatment recommendations. Pt verbalizes understanding. Follow up was discussed. Patient was given the opportunity to ask questions and discuss other concerns.         Follow Up   No follow-ups on file.  Patient was given instructions and counseling regarding his condition or for health maintenance advice. Please see specific information pulled into the AVS if appropriate.

## 2021-08-24 RX ORDER — AZITHROMYCIN 250 MG/1
TABLET, FILM COATED ORAL
Qty: 6 TABLET | Refills: 0 | Status: SHIPPED | OUTPATIENT
Start: 2021-08-24 | End: 2021-08-29

## 2021-08-24 NOTE — PROGRESS NOTES
Resp panel was negative for everything.   Will call in anbx to take if no improvement in the next couple of days. It will be ready at the pharmacy.

## 2021-09-07 DIAGNOSIS — I10 ESSENTIAL HYPERTENSION: ICD-10-CM

## 2021-09-07 RX ORDER — LISINOPRIL 10 MG/1
10 TABLET ORAL DAILY
Qty: 90 TABLET | Refills: 1 | Status: SHIPPED | OUTPATIENT
Start: 2021-09-07 | End: 2022-01-13 | Stop reason: SDUPTHER

## 2021-09-07 NOTE — TELEPHONE ENCOUNTER
Caller: Nehemias Strong Jr.    Relationship: Self    Best call back number: [phone]   Medication needed:   Requested Prescriptions     Pending Prescriptions Disp Refills   • lisinopril (PRINIVIL,ZESTRIL) 10 MG tablet 90 tablet 1     Sig: Take 1 tablet by mouth Daily.       When do you need the refill by: 9/8/21    What additional details did the patient provide when requesting the medication: WILL NEED THREE MONTH SUPPLY WITH REFILLS    Does the patient have less than a 3 day supply:  [] Yes  [x] No    What is the patient's preferred pharmacy: [facility] - [address] [phone] [facility] [phone]

## 2021-10-11 ENCOUNTER — OFFICE VISIT (OUTPATIENT)
Dept: FAMILY MEDICINE CLINIC | Facility: CLINIC | Age: 66
End: 2021-10-11

## 2021-10-11 ENCOUNTER — LAB (OUTPATIENT)
Dept: FAMILY MEDICINE CLINIC | Facility: CLINIC | Age: 66
End: 2021-10-11

## 2021-10-11 VITALS
HEIGHT: 73 IN | RESPIRATION RATE: 16 BRPM | HEART RATE: 57 BPM | TEMPERATURE: 98 F | DIASTOLIC BLOOD PRESSURE: 70 MMHG | OXYGEN SATURATION: 97 % | SYSTOLIC BLOOD PRESSURE: 110 MMHG | WEIGHT: 221 LBS | BODY MASS INDEX: 29.29 KG/M2

## 2021-10-11 DIAGNOSIS — Z23 NEED FOR HEPATITIS VACCINATION: ICD-10-CM

## 2021-10-11 DIAGNOSIS — R05.8 PRODUCTIVE COUGH: ICD-10-CM

## 2021-10-11 DIAGNOSIS — Z23 NEED FOR VACCINATION: ICD-10-CM

## 2021-10-11 DIAGNOSIS — R05.8 PRODUCTIVE COUGH: Primary | ICD-10-CM

## 2021-10-11 PROCEDURE — 90471 IMMUNIZATION ADMIN: CPT | Performed by: FAMILY MEDICINE

## 2021-10-11 PROCEDURE — 90686 IIV4 VACC NO PRSV 0.5 ML IM: CPT | Performed by: FAMILY MEDICINE

## 2021-10-11 PROCEDURE — 99213 OFFICE O/P EST LOW 20 MIN: CPT | Performed by: FAMILY MEDICINE

## 2021-10-11 RX ORDER — PANTOPRAZOLE SODIUM 40 MG/1
40 TABLET, DELAYED RELEASE ORAL DAILY
Qty: 30 TABLET | Refills: 2 | Status: SHIPPED | OUTPATIENT
Start: 2021-10-11 | End: 2021-10-12

## 2021-10-11 NOTE — PROGRESS NOTES
Chief Complaint   Patient presents with   • ENT follow up     HPI  Nehemias Strong Jr. is a 65 y.o. male that presents for   Chief Complaint   Patient presents with   • ENT follow up     Cough: patient reports persistent productive cough. He was seen 6 weeks ago by Brown and prescribed 5 days azithromycin. Denies fever. Rare night sweats that are stable over the last 5-6 years. Minimal SOB and wheezing. No smoking history.     Review of Systems   Constitutional: Negative for fever.   HENT: Positive for swollen glands. Negative for congestion, drooling, ear discharge, ear pain and trouble swallowing.    Respiratory: Positive for cough, shortness of breath and wheezing.    Gastrointestinal: Negative for abdominal pain, diarrhea and vomiting.   Musculoskeletal: Negative for neck pain.     The following portions of the patient's history were reviewed and updated as appropriate: problem list, past medical history, past surgical history, allergies, current medication    Problem List Tab  Patient History Tab  Immunizations Tab  Medications Tab  Chart Review Tab  Care Everywhere Tab  Synopsis Tab    PE  Vitals:    10/11/21 1457   BP: 110/70   Pulse: 57   Resp: 16   Temp: 98 °F (36.7 °C)   SpO2: 97%     Body mass index is 29.16 kg/m².  General: Well nourished, NAD  Head: AT/NC  Eyes: EOMI, anicteric sclera  Resp: CTAB, SCR, BS equal  CV: RRR w/o m/r/g; 2+ pulses  GI: Soft, NT/ND, +BS  MSK: FROM, no deformity, no edema  Skin: Warm, dry, intact  Neuro: Alert and oriented. No focal deficits  Psych: Appropriate mood and affect    Imaging  No Images in the past 120 days found..    Assessment/Plan   Nehemias Strong Jr. is a 65 y.o. male that presents for   Chief Complaint   Patient presents with   • ENT follow up     Diagnoses and all orders for this visit:    1. Productive cough (Primary): Patient with persistent bronchitis and productive cough despite treatment with azithromycin.  Will obtain chest x-ray and sputum culture  as well as pulmonary function test to further evaluate.  Anticipate course of doxycycline or other antibiotic pending sputum culture.  While I find it unlikely that productive cough would be secondary to reflux, will go ahead and improve reflux coverage to pantoprazole  -     Respiratory Culture - Sputum, Cough; Future  -     Pulmonary Function Test; Future  -     XR Chest PA & Lateral  -     Start pantoprazole (PROTONIX) 40 MG EC tablet; Take 1 tablet by mouth Daily.  Dispense: 30 tablet; Refill: 2  - Discontinue famotidine    2. Need for hepatitis vaccination    3. Need for vaccination  -     Cancel: Fluzone High-Dose 65+yrs  -     FluLaval/Fluarix (VFC) >6 Months     Return in about 4 weeks (around 11/8/2021) for Recheck- 15min.  Answers for HPI/ROS submitted by the patient on 10/4/2021  What is the primary reason for your visit?: Sore Throat  Chronicity: recurrent  Onset: more than 1 month ago  Progression since onset: waxing and waning  Pain worse on: neither  Fever: no fever  Fever duration: less than 1 day  Pain - numeric: 5/10  headaches: No  hoarse voice: Yes  plugged ear sensation: Yes  strep: No  mono: No      Answers for HPI/ROS submitted by the patient on 10/4/2021  What is the primary reason for your visit?: Sore Throat  Chronicity: recurrent  Onset: more than 1 month ago  Progression since onset: waxing and waning  Pain worse on: neither  Fever: no fever  Fever duration: less than 1 day  Pain - numeric: 5/10  headaches: No  hoarse voice: Yes  plugged ear sensation: Yes  strep: No  mono: No

## 2021-10-12 ENCOUNTER — HOSPITAL ENCOUNTER (OUTPATIENT)
Dept: GENERAL RADIOLOGY | Facility: HOSPITAL | Age: 66
Discharge: HOME | End: 2021-10-12

## 2021-10-12 ENCOUNTER — LAB (OUTPATIENT)
Dept: LAB | Facility: HOSPITAL | Age: 66
End: 2021-10-12

## 2021-10-12 DIAGNOSIS — R05.8 PRODUCTIVE COUGH: ICD-10-CM

## 2021-10-12 PROCEDURE — 87205 SMEAR GRAM STAIN: CPT

## 2021-10-12 PROCEDURE — 87070 CULTURE OTHR SPECIMN AEROBIC: CPT

## 2021-10-12 PROCEDURE — 71046 X-RAY EXAM CHEST 2 VIEWS: CPT

## 2021-10-12 RX ORDER — PANTOPRAZOLE SODIUM 40 MG/1
40 TABLET, DELAYED RELEASE ORAL DAILY
Qty: 90 TABLET | Refills: 1 | Status: SHIPPED | OUTPATIENT
Start: 2021-10-12 | End: 2021-11-10

## 2021-10-13 ENCOUNTER — TRANSCRIBE ORDERS (OUTPATIENT)
Dept: ADMINISTRATIVE | Facility: HOSPITAL | Age: 66
End: 2021-10-13

## 2021-10-13 DIAGNOSIS — Z01.818 OTHER SPECIFIED PRE-OPERATIVE EXAMINATION: Primary | ICD-10-CM

## 2021-10-13 RX ORDER — DOXYCYCLINE HYCLATE 100 MG/1
100 CAPSULE ORAL 2 TIMES DAILY
Qty: 14 CAPSULE | Refills: 0 | Status: SHIPPED | OUTPATIENT
Start: 2021-10-13 | End: 2021-10-20

## 2021-10-14 LAB
BACTERIA SPEC RESP CULT: NORMAL
GRAM STN SPEC: NORMAL

## 2021-10-19 ENCOUNTER — APPOINTMENT (OUTPATIENT)
Dept: RESPIRATORY THERAPY | Facility: HOSPITAL | Age: 66
End: 2021-10-19

## 2021-10-25 ENCOUNTER — LAB (OUTPATIENT)
Dept: LAB | Facility: HOSPITAL | Age: 66
End: 2021-10-25

## 2021-10-25 LAB — SARS-COV-2 ORF1AB RESP QL NAA+PROBE: NOT DETECTED

## 2021-10-25 PROCEDURE — U0004 COV-19 TEST NON-CDC HGH THRU: HCPCS | Performed by: INTERNAL MEDICINE

## 2021-10-25 PROCEDURE — C9803 HOPD COVID-19 SPEC COLLECT: HCPCS | Performed by: INTERNAL MEDICINE

## 2021-10-27 ENCOUNTER — HOSPITAL ENCOUNTER (OUTPATIENT)
Dept: RESPIRATORY THERAPY | Facility: HOSPITAL | Age: 66
Discharge: HOME | End: 2021-10-27
Admitting: FAMILY MEDICINE

## 2021-10-27 VITALS — OXYGEN SATURATION: 96 % | RESPIRATION RATE: 12 BRPM | HEART RATE: 60 BPM

## 2021-10-27 DIAGNOSIS — R05.8 PRODUCTIVE COUGH: ICD-10-CM

## 2021-10-27 PROCEDURE — 94060 EVALUATION OF WHEEZING: CPT

## 2021-10-27 RX ORDER — ALBUTEROL SULFATE 90 UG/1
2 AEROSOL, METERED RESPIRATORY (INHALATION) ONCE
Status: COMPLETED | OUTPATIENT
Start: 2021-10-27 | End: 2021-10-27

## 2021-10-27 RX ADMIN — ALBUTEROL SULFATE 2 PUFF: 108 INHALANT RESPIRATORY (INHALATION) at 07:05

## 2021-11-10 ENCOUNTER — OFFICE VISIT (OUTPATIENT)
Dept: FAMILY MEDICINE CLINIC | Facility: CLINIC | Age: 66
End: 2021-11-10

## 2021-11-10 VITALS
RESPIRATION RATE: 16 BRPM | SYSTOLIC BLOOD PRESSURE: 140 MMHG | HEIGHT: 73 IN | OXYGEN SATURATION: 96 % | DIASTOLIC BLOOD PRESSURE: 66 MMHG | WEIGHT: 222 LBS | TEMPERATURE: 97.7 F | HEART RATE: 68 BPM | BODY MASS INDEX: 29.42 KG/M2

## 2021-11-10 DIAGNOSIS — R09.81 CONGESTION OF NASAL SINUS: Primary | ICD-10-CM

## 2021-11-10 DIAGNOSIS — R09.81 CONGESTION OF NASAL SINUS: ICD-10-CM

## 2021-11-10 PROCEDURE — 99213 OFFICE O/P EST LOW 20 MIN: CPT | Performed by: FAMILY MEDICINE

## 2021-11-10 RX ORDER — GUAIFENESIN 600 MG/1
600 TABLET, EXTENDED RELEASE ORAL 2 TIMES DAILY
Qty: 60 TABLET | Refills: 1 | Status: SHIPPED | OUTPATIENT
Start: 2021-11-10 | End: 2022-04-25

## 2021-11-10 RX ORDER — FAMOTIDINE 40 MG/1
40 TABLET, FILM COATED ORAL DAILY
COMMUNITY
End: 2023-04-04 | Stop reason: CLARIF

## 2021-11-10 RX ORDER — AMOXICILLIN AND CLAVULANATE POTASSIUM 875; 125 MG/1; MG/1
1 TABLET, FILM COATED ORAL 2 TIMES DAILY
Qty: 20 TABLET | Refills: 0 | Status: SHIPPED | OUTPATIENT
Start: 2021-11-10 | End: 2021-11-20

## 2021-11-10 RX ORDER — FLUTICASONE PROPIONATE 50 MCG
2 SPRAY, SUSPENSION (ML) NASAL DAILY
Qty: 16 G | Refills: 3 | Status: SHIPPED | OUTPATIENT
Start: 2021-11-10 | End: 2021-11-10

## 2021-11-10 RX ORDER — FLUTICASONE PROPIONATE 50 MCG
SPRAY, SUSPENSION (ML) NASAL
Qty: 48 G | Refills: 3 | Status: SHIPPED | OUTPATIENT
Start: 2021-11-10 | End: 2021-11-12

## 2021-11-10 NOTE — PROGRESS NOTES
Chief Complaint   Patient presents with   • Cough     1mo fu     HPI  Nehemias Strong Jr. is a 65 y.o. male that presents for   Chief Complaint   Patient presents with   • Cough     1mo fu     Patient continues to have cough. He clarifies today that most of this is actually congestion that he is blowing out his nose. PFTs revealed small airway obstructive disease. Continues to report mild SOB and minimal productive cough. He reports having procedure for deviated septum w/ ENT- Dr PRATHER.     Review of Systems   Constitutional: Negative for unexpected weight loss.   HENT: Positive for congestion, postnasal drip and sore throat. Negative for ear pain and rhinorrhea.    Respiratory: Positive for cough, shortness of breath and wheezing.    Cardiovascular: Negative for chest pain.   Musculoskeletal: Negative for myalgias.   Skin: Negative for rash.     The following portions of the patient's history were reviewed and updated as appropriate: problem list, past medical history, past surgical history, allergies, current medication    Problem List Tab  Patient History Tab  Immunizations Tab  Medications Tab  Chart Review Tab  Care Everywhere Tab  Synopsis Tab    PE  Vitals:    11/10/21 1436   BP: 140/66   Pulse: 68   Resp: 16   Temp: 97.7 °F (36.5 °C)   SpO2: 96%     Body mass index is 29.29 kg/m².  General: Well nourished, NAD  Head: AT/NC  Eyes: EOMI, anicteric sclera  Resp: CTAB, SCR, BS equal  CV: RRR w/o m/r/g; 2+ pulses  GI: Soft, NT/ND, +BS  MSK: FROM, no deformity, no edema  Skin: Warm, dry, intact  Neuro: Alert and oriented. No focal deficits  Psych: Appropriate mood and affect    Imaging  XR Chest PA & Lateral    Result Date: 10/12/2021  IMPRESSION :  1. Stable mild cardiomegaly. 2. Mild left basilar scarring. 3. No active disease  Electronically Signed By-Williams Pena MD On:10/12/2021 9:06 AM This report was finalized on [id number] by  Williams Pena MD.      Assessment/Plan   Nehemias Strong Jr. is a 65 y.o.  male that presents for   Chief Complaint   Patient presents with   • Cough     1mo fu     Diagnoses and all orders for this visit:    1. Congestion of nasal sinus (Primary): After further discussion, mucus/sputum is actually being blown from his nose rather than coughing it up.  Patient does have history of a sinus surgery and has follow-up with Dr. PRATHER.  I am more concerned about sinusitis/nasal congestion and will treat as below.  May also consider LABA with steroid if shortness of breath continues given small airway obstructive disease on PFTs  -     Start amoxicillin-clavulanate (Augmentin) 875-125 MG per tablet; Take 1 tablet by mouth 2 (Two) Times a Day for 10 days.  Dispense: 20 tablet; Refill: 0  -     Continue fluticasone (FLONASE) 50 MCG/ACT nasal spray; 2 sprays into the nostril(s) as directed by provider Daily.  Dispense: 16 g; Refill: 3  -     Start guaiFENesin (Mucinex) 600 MG 12 hr tablet; Take 1 tablet by mouth 2 (Two) Times a Day.  Dispense: 60 tablet; Refill: 1  - Continue ENT follow-up (appointment with Dr. PRATHER on 12/9)     Return if symptoms worsen or fail to improve.

## 2021-11-23 ENCOUNTER — TELEPHONE (OUTPATIENT)
Dept: FAMILY MEDICINE CLINIC | Facility: CLINIC | Age: 66
End: 2021-11-23

## 2021-11-23 RX ORDER — MONTELUKAST SODIUM 10 MG/1
10 TABLET ORAL NIGHTLY
Qty: 90 TABLET | Refills: 1 | Status: SHIPPED | OUTPATIENT
Start: 2021-11-23 | End: 2022-04-25 | Stop reason: SDUPTHER

## 2021-11-23 NOTE — TELEPHONE ENCOUNTER
I have ordered Singulair for you to begin.  I would also recommend a daily antihistamine such as Zyrtec or Claritin if not taking already.  Beyond this, I would recommend seeing Dr. PRATHER in early December for further recommendations

## 2021-11-23 NOTE — TELEPHONE ENCOUNTER
Patient Update:  Still having sore throat issues. Mucus is clear but still constant. Voice can be raspy. SOB is less but still there. Constintantly clearing his throat. What is his next step? Please advise.

## 2021-11-23 NOTE — TELEPHONE ENCOUNTER
Caller: Nehemias Strong Jr.    Relationship: Self    Best call back number:[phone]    What is the best time to reach you: ANY    Who are you requesting to speak with (clinical staff, provider,  specific staff member): DR ROSARIO    Do you know the name of the person who called:     What was the call regarding: A CALL BACK FOR ORDERS    Do you require a callback: YES

## 2021-11-29 ENCOUNTER — TELEPHONE (OUTPATIENT)
Dept: FAMILY MEDICINE CLINIC | Facility: CLINIC | Age: 66
End: 2021-11-29

## 2021-11-29 NOTE — TELEPHONE ENCOUNTER
----- Message from Mary Anne Avalos sent at 11/29/2021  9:21 AM EST -----  Regarding: FW: MEDS CALLED IN      ----- Message -----  From: Nehemias Strong Jr.  Sent: 11/29/2021   9:08 AM EST  To: Mary Anne Avalos  Subject: MEDS CALLED IN                                   Since Wednesday, the discharge from my nose and mouth have returned to dark green in color.  Sleeping has been a challenge and the need to clear my throat doesn't seem to quit.  No fever.  Every morning it takes about 2 hours to clear the mucus from my throat and phlegm which includes large scabs from both sources.  I thought using the Mucinex with the anti-biotics earlier would relieve this problem.  It appears chronic.  Still using Mucinex.   I cough very regularly and have put a call into Dr. PRATHER's office three days ago with no follow-up from his team.  What is the probability that this has manifest into  full-up pneumonia?  If any relief can be attained I would appreciate the assistance.  [phone] cell

## 2021-12-22 ENCOUNTER — TELEPHONE (OUTPATIENT)
Dept: FAMILY MEDICINE CLINIC | Facility: CLINIC | Age: 66
End: 2021-12-22

## 2021-12-22 ENCOUNTER — TRANSCRIBE ORDERS (OUTPATIENT)
Dept: ADMINISTRATIVE | Facility: HOSPITAL | Age: 66
End: 2021-12-22

## 2021-12-22 ENCOUNTER — HOSPITAL ENCOUNTER (OUTPATIENT)
Dept: GENERAL RADIOLOGY | Facility: HOSPITAL | Age: 66
Discharge: HOME | End: 2021-12-22
Admitting: PHYSICIAN ASSISTANT

## 2021-12-22 DIAGNOSIS — J32.9 RECURRENT SINUS INFECTIONS: ICD-10-CM

## 2021-12-22 DIAGNOSIS — R05.8 PRODUCTIVE COUGH: ICD-10-CM

## 2021-12-22 DIAGNOSIS — J32.9 RECURRENT SINUS INFECTIONS: Primary | ICD-10-CM

## 2021-12-22 PROCEDURE — 71046 X-RAY EXAM CHEST 2 VIEWS: CPT

## 2021-12-22 NOTE — TELEPHONE ENCOUNTER
Caller: Nehemias Strong Jr.    Relationship: Self    Best call back number: [phone]    What form or medical record are you requesting: MEDICAL RECORD SHOWING HE HAS CHRONIC RHINITIS     Who is requesting this form or medical record from you: [facility]    How would you like to receive the form or medical records (pick-up, mail, fax): PICK-UP    Timeframe paperwork needed: NEXT WEEK    Additional notes: PATIENT CALLED AND SAID HE NEEDS TO GET THIS LISTED WITH [facility] AS A CONDITION HE HAS     HE IS WANTING TO SEE I HE CAN PICK THIS UP TO TAKE IT TO THEM

## 2021-12-23 ENCOUNTER — TELEPHONE (OUTPATIENT)
Dept: FAMILY MEDICINE CLINIC | Facility: CLINIC | Age: 66
End: 2021-12-23

## 2021-12-23 NOTE — TELEPHONE ENCOUNTER
Caller: Nehemias Strong Jr.    Relationship: Self    Best call back number: [phone]    What form or medical record are you requesting: PLEASE REFERENCE 12/23/21 ENCOUNTER. MISSING SIGNATURE    How would you like to receive the form or medical records (pick-up, mail, fax):    Additional notes: PLEASE SIGN AND CONTACT PATIENT WHEN READY FOR PICKUP

## 2021-12-28 ENCOUNTER — TELEPHONE (OUTPATIENT)
Dept: FAMILY MEDICINE CLINIC | Facility: CLINIC | Age: 66
End: 2021-12-28

## 2021-12-28 NOTE — TELEPHONE ENCOUNTER
Caller: Nehemias Strong Jr.    Relationship: Self    Best call back number: [phone]     What is the best time to reach you: ANYTIME     Who are you requesting to speak with (clinical staff, provider,  specific staff member): CLINICAL     Do you know the name of the person who called: NEHEMIAS    What was the call regarding:NEHEMIAS WANTS TO KNOW IF DR. ROSARIO WAS ABLE TO SIGN LETTER STATING HE HAS CHRONIC BRONITIS.HE IS NEEDING TO TURN LETTER IN BY  1/3/2021    Do you require a callback:YES

## 2022-01-13 ENCOUNTER — TELEPHONE (OUTPATIENT)
Dept: FAMILY MEDICINE CLINIC | Facility: CLINIC | Age: 67
End: 2022-01-13

## 2022-01-13 DIAGNOSIS — I10 ESSENTIAL HYPERTENSION: ICD-10-CM

## 2022-01-13 RX ORDER — LISINOPRIL 10 MG/1
10 TABLET ORAL DAILY
Qty: 90 TABLET | Refills: 1 | Status: SHIPPED | OUTPATIENT
Start: 2022-01-13 | End: 2022-04-25

## 2022-01-13 NOTE — TELEPHONE ENCOUNTER
Caller: Nehemias Strong Jr.    Relationship: Self    Best call back number: [phone]    Requested Prescriptions:   Requested Prescriptions     Pending Prescriptions Disp Refills   • lisinopril (PRINIVIL,ZESTRIL) 10 MG tablet 90 tablet 1     Sig: Take 1 tablet by mouth Daily.        Pharmacy where request should be sent: [id number] [address] AT [place] [phone] [place] [phone] FX         Does the patient have less than a 3 day supply:  [] Yes  [x] No    Cash Palacios   01/13/22 13:29 EST

## 2022-01-14 ENCOUNTER — TELEPHONE (OUTPATIENT)
Dept: FAMILY MEDICINE CLINIC | Facility: CLINIC | Age: 67
End: 2022-01-14

## 2022-01-14 NOTE — TELEPHONE ENCOUNTER
Caller: Nehemias Strong Jr.    Relationship to patient: Self    Best call back number: [phone]     Patient is needing:    NEHEMIAS WOULD LIKE TO KNOW IF DR. ROSARIO RECEIVED A MEDICAL RECORDS REQUEST [facility]. THEY BELIEVE THEY MAY HAVE SENT TO WRONG PROVIDER       PLEASE ADVISE

## 2022-04-01 PROCEDURE — U0004 COV-19 TEST NON-CDC HGH THRU: HCPCS | Performed by: FAMILY MEDICINE

## 2022-04-25 ENCOUNTER — LAB (OUTPATIENT)
Dept: FAMILY MEDICINE CLINIC | Facility: CLINIC | Age: 67
End: 2022-04-25

## 2022-04-25 ENCOUNTER — OFFICE VISIT (OUTPATIENT)
Dept: FAMILY MEDICINE CLINIC | Facility: CLINIC | Age: 67
End: 2022-04-25

## 2022-04-25 VITALS
TEMPERATURE: 98 F | HEIGHT: 73 IN | WEIGHT: 222 LBS | HEART RATE: 70 BPM | BODY MASS INDEX: 29.42 KG/M2 | RESPIRATION RATE: 16 BRPM | OXYGEN SATURATION: 96 % | SYSTOLIC BLOOD PRESSURE: 116 MMHG | DIASTOLIC BLOOD PRESSURE: 83 MMHG

## 2022-04-25 DIAGNOSIS — Z12.5 SCREENING PSA (PROSTATE SPECIFIC ANTIGEN): ICD-10-CM

## 2022-04-25 DIAGNOSIS — E04.1 THYROID NODULE: ICD-10-CM

## 2022-04-25 DIAGNOSIS — R00.1 BRADYCARDIA: ICD-10-CM

## 2022-04-25 DIAGNOSIS — Z00.00 ANNUAL PHYSICAL EXAM: Primary | ICD-10-CM

## 2022-04-25 DIAGNOSIS — K21.9 GASTROESOPHAGEAL REFLUX DISEASE, UNSPECIFIED WHETHER ESOPHAGITIS PRESENT: ICD-10-CM

## 2022-04-25 DIAGNOSIS — I10 PRIMARY HYPERTENSION: ICD-10-CM

## 2022-04-25 DIAGNOSIS — J30.9 ALLERGIC RHINITIS, UNSPECIFIED SEASONALITY, UNSPECIFIED TRIGGER: ICD-10-CM

## 2022-04-25 DIAGNOSIS — J44.9 OBSTRUCTIVE LUNG DISEASE (GENERALIZED): ICD-10-CM

## 2022-04-25 PROBLEM — J18.9 LEFT LOWER LOBE PNEUMONIA: Status: RESOLVED | Noted: 2019-08-29 | Resolved: 2022-04-25

## 2022-04-25 PROBLEM — D17.1 LIPOMA OF TORSO: Status: RESOLVED | Noted: 2020-12-21 | Resolved: 2022-04-25

## 2022-04-25 PROBLEM — Z02.89 ENCOUNTER FOR FEDERAL AVIATION ADMINISTRATION (FAA) EXAMINATION: Status: RESOLVED | Noted: 2020-06-09 | Resolved: 2022-04-25

## 2022-04-25 PROBLEM — Z23 NEED FOR OTHER PROPHYLACTIC VACCINATION AND INOCULATION AGAINST SINGLE DISEASES: Status: RESOLVED | Noted: 2018-05-01 | Resolved: 2022-04-25

## 2022-04-25 LAB
25(OH)D3 SERPL-MCNC: 26.9 NG/ML (ref 30–100)
ALBUMIN SERPL-MCNC: 4.3 G/DL (ref 3.5–5.2)
ALBUMIN/GLOB SERPL: 1.7 G/DL
ALP SERPL-CCNC: 74 U/L (ref 39–117)
ALT SERPL W P-5'-P-CCNC: 38 U/L (ref 1–41)
ANION GAP SERPL CALCULATED.3IONS-SCNC: 13 MMOL/L (ref 5–15)
AST SERPL-CCNC: 25 U/L (ref 1–40)
BASOPHILS # BLD AUTO: 0.02 10*3/MM3 (ref 0–0.2)
BASOPHILS NFR BLD AUTO: 0.4 % (ref 0–1.5)
BILIRUB SERPL-MCNC: 0.6 MG/DL (ref 0–1.2)
BUN SERPL-MCNC: 15 MG/DL (ref 8–23)
BUN/CREAT SERPL: 11.7 (ref 7–25)
CALCIUM SPEC-SCNC: 9.2 MG/DL (ref 8.6–10.5)
CHLORIDE SERPL-SCNC: 102 MMOL/L (ref 98–107)
CHOLEST SERPL-MCNC: 192 MG/DL (ref 0–200)
CO2 SERPL-SCNC: 25 MMOL/L (ref 22–29)
CREAT SERPL-MCNC: 1.28 MG/DL (ref 0.76–1.27)
DEPRECATED RDW RBC AUTO: 40.1 FL (ref 37–54)
EGFRCR SERPLBLD CKD-EPI 2021: 62.5 ML/MIN/1.73
EOSINOPHIL # BLD AUTO: 0.09 10*3/MM3 (ref 0–0.4)
EOSINOPHIL NFR BLD AUTO: 1.9 % (ref 0.3–6.2)
ERYTHROCYTE [DISTWIDTH] IN BLOOD BY AUTOMATED COUNT: 12.7 % (ref 12.3–15.4)
GLOBULIN UR ELPH-MCNC: 2.6 GM/DL
GLUCOSE SERPL-MCNC: 102 MG/DL (ref 65–99)
HBA1C MFR BLD: 5.4 % (ref 3.5–5.6)
HCT VFR BLD AUTO: 44.1 % (ref 37.5–51)
HDLC SERPL-MCNC: 41 MG/DL (ref 40–60)
HGB BLD-MCNC: 15 G/DL (ref 13–17.7)
IMM GRANULOCYTES # BLD AUTO: 0.01 10*3/MM3 (ref 0–0.05)
IMM GRANULOCYTES NFR BLD AUTO: 0.2 % (ref 0–0.5)
LDLC SERPL CALC-MCNC: 126 MG/DL (ref 0–100)
LDLC/HDLC SERPL: 3 {RATIO}
LYMPHOCYTES # BLD AUTO: 2.03 10*3/MM3 (ref 0.7–3.1)
LYMPHOCYTES NFR BLD AUTO: 41.9 % (ref 19.6–45.3)
MCH RBC QN AUTO: 29.5 PG (ref 26.6–33)
MCHC RBC AUTO-ENTMCNC: 34 G/DL (ref 31.5–35.7)
MCV RBC AUTO: 86.8 FL (ref 79–97)
MONOCYTES # BLD AUTO: 0.48 10*3/MM3 (ref 0.1–0.9)
MONOCYTES NFR BLD AUTO: 9.9 % (ref 5–12)
NEUTROPHILS NFR BLD AUTO: 2.22 10*3/MM3 (ref 1.7–7)
NEUTROPHILS NFR BLD AUTO: 45.7 % (ref 42.7–76)
NRBC BLD AUTO-RTO: 0 /100 WBC (ref 0–0.2)
PLATELET # BLD AUTO: 181 10*3/MM3 (ref 140–450)
PMV BLD AUTO: 9.8 FL (ref 6–12)
POTASSIUM SERPL-SCNC: 3.9 MMOL/L (ref 3.5–5.2)
PROT SERPL-MCNC: 6.9 G/DL (ref 6–8.5)
PSA SERPL-MCNC: 0.76 NG/ML (ref 0–4)
RBC # BLD AUTO: 5.08 10*6/MM3 (ref 4.14–5.8)
SODIUM SERPL-SCNC: 140 MMOL/L (ref 136–145)
T4 FREE SERPL-MCNC: 1.01 NG/DL (ref 0.93–1.7)
TRIGL SERPL-MCNC: 141 MG/DL (ref 0–150)
TSH SERPL DL<=0.05 MIU/L-ACNC: 0.87 UIU/ML (ref 0.27–4.2)
VIT B12 BLD-MCNC: 502 PG/ML (ref 211–946)
VLDLC SERPL-MCNC: 25 MG/DL (ref 5–40)
WBC NRBC COR # BLD: 4.85 10*3/MM3 (ref 3.4–10.8)

## 2022-04-25 PROCEDURE — 85025 COMPLETE CBC W/AUTO DIFF WBC: CPT | Performed by: FAMILY MEDICINE

## 2022-04-25 PROCEDURE — 36415 COLL VENOUS BLD VENIPUNCTURE: CPT | Performed by: FAMILY MEDICINE

## 2022-04-25 PROCEDURE — 82306 VITAMIN D 25 HYDROXY: CPT | Performed by: FAMILY MEDICINE

## 2022-04-25 PROCEDURE — 83036 HEMOGLOBIN GLYCOSYLATED A1C: CPT | Performed by: FAMILY MEDICINE

## 2022-04-25 PROCEDURE — 99396 PREV VISIT EST AGE 40-64: CPT | Performed by: FAMILY MEDICINE

## 2022-04-25 PROCEDURE — 84439 ASSAY OF FREE THYROXINE: CPT | Performed by: FAMILY MEDICINE

## 2022-04-25 PROCEDURE — 86769 SARS-COV-2 COVID-19 ANTIBODY: CPT | Performed by: FAMILY MEDICINE

## 2022-04-25 PROCEDURE — 80061 LIPID PANEL: CPT | Performed by: FAMILY MEDICINE

## 2022-04-25 PROCEDURE — 84443 ASSAY THYROID STIM HORMONE: CPT | Performed by: FAMILY MEDICINE

## 2022-04-25 PROCEDURE — 80053 COMPREHEN METABOLIC PANEL: CPT | Performed by: FAMILY MEDICINE

## 2022-04-25 PROCEDURE — G0103 PSA SCREENING: HCPCS | Performed by: FAMILY MEDICINE

## 2022-04-25 PROCEDURE — 82607 VITAMIN B-12: CPT | Performed by: FAMILY MEDICINE

## 2022-04-25 RX ORDER — MONTELUKAST SODIUM 10 MG/1
10 TABLET ORAL NIGHTLY
Qty: 90 TABLET | Refills: 1 | Status: SHIPPED | OUTPATIENT
Start: 2022-04-25 | End: 2022-11-20

## 2022-04-25 RX ORDER — LOSARTAN POTASSIUM 25 MG/1
25 TABLET ORAL DAILY
Qty: 90 TABLET | Refills: 1 | Status: SHIPPED | OUTPATIENT
Start: 2022-04-25 | End: 2022-07-20

## 2022-04-25 RX ORDER — FLUTICASONE PROPIONATE AND SALMETEROL XINAFOATE 115; 21 UG/1; UG/1
2 AEROSOL, METERED RESPIRATORY (INHALATION)
Qty: 12 G | Refills: 11 | Status: SHIPPED | OUTPATIENT
Start: 2022-04-25 | End: 2022-09-12

## 2022-04-25 NOTE — PROGRESS NOTES
Chief Complaint   Patient presents with   • Annual Exam     HPI  Nehemias Strong Jr. is a 66 y.o. male that presents for   Chief Complaint   Patient presents with   • Annual Exam     HTN: 116/83 today. Maintained on lisinopril 10 daily. No LH/dizziness, CP. He does report some SOB w/ activity and increased cough since starting the lisinopril.    Cough: patient has been seen a few times for chronic. PFTs from 10/2021 was concerning for small airway obstructive lung disease w/ response to bronchodilator. He is maintained on Singulair daily w/ hx of allergic rhinitis but continues to cough daily. He reports productive cough each morning. Also reports some SOB w/ activity    GERD: taking Pepcid 40mg daily. No symptoms of heartburn. Chronic cough despite taking this medicine. No dysphagia     Thyroid nodule: Incidental finding and work-up of hemoptysis-as seen on 2/2020 CT chest. R-sided, 1.7cm in size. 5/2020 US revealed 2.4cm mixed cystic and solid nodule. This is followed by ENT (Dr PRATHER) who has performed FNA and is following w/ serial US. Asymptomatic, no complaints     Arrhythmia: hx of sinus bradycardia. Follows w/ TANJA Smith at [place]. 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   HENT: Positive for congestion and rhinorrhea. Negative for sore throat and trouble swallowing.    Eyes: Negative for visual disturbance.   Respiratory: Positive for cough. Negative for shortness of breath.    Cardiovascular: Negative for chest pain and palpitations.   Gastrointestinal: Negative for abdominal pain, constipation, diarrhea, nausea, vomiting and GERD.   Genitourinary: Negative for difficulty urinating and dysuria.   Musculoskeletal: Negative for arthralgias and joint swelling.   Skin: Negative for rash and skin lesions.   Neurological: Negative for weakness and headache.   Psychiatric/Behavioral: Negative for  depressed mood. The patient is not nervous/anxious.      The following portions of the patient's history were reviewed and updated as appropriate: problem list, past medical history, past surgical history, allergies, current medications, past social history and past family history.    Problem List Tab  Patient History Tab  Immunizations Tab  Medications Tab  Chart Review Tab  Care Everywhere Tab  Synopsis Tab    PE  Vitals:    04/25/22 1005   BP: 116/83   Pulse: 70   Resp: 16   Temp: 98 °F (36.7 °C)   SpO2: 96%     Body mass index is 29.3 kg/m².  General: Well nourished, NAD  Head: AT/NC  Eyes: EOMI, anicteric sclera  ENT: MMM w/o erythema. TM clear bilaterally  Neck: Supple, no thyromegaly or LAD. No carotid bruit  Resp: CTAB, SCR, BS equal  CV: RRR w/o m/r/g; 2+ pulses  GI: Soft, NT/ND, +BS  MSK: FROM, no deformity, no edema  Skin: Warm, dry, intact  Neuro: Alert and oriented. No focal deficits  Psych: Appropriate mood and affect    Scant LLL crackles    Imaging  XR Chest 2 View    Result Date: 4/1/2022  No acute cardiopulmonary process.  Electronically Signed By-Beau Oneill MD On:4/1/2022 12:59 PM This report was finalized on [id number] by  Beau Oneill MD.      Assessment/Plan   Nehemias Strong Jr. is a 66 y.o. male that presents for   Chief Complaint   Patient presents with   • Annual Exam     Diagnoses and all orders for this visit:    1. Annual physical exam (Primary)  -     CBC & Differential  -     Comprehensive Metabolic Panel  -     Hemoglobin A1c  -     Lipid Panel  -     TSH  -     T4, Free  -     Vitamin D 25 Hydroxy  -     Vitamin B12  -     PSA Screen  -     SARS-CoV-2 Semi-Quant Total Ab  -     SARS-CoV-2 Antibodies (Roche)  -  Counseled regarding diet, exercise, weight loss, and preventative health maintenance items/immunizations below    2. Primary hypertension: 116/83 today.  Patient with chronic cough that is worsened since starting lisinopril  -     Start losartan (Cozaar) 25 MG tablet;  Take 1 tablet by mouth Daily.  Dispense: 90 tablet; Refill: 1  - Discontinue lisinopril 10 mg daily    3. Obstructive lung disease (generalized) (HCC): Previously evaluated in November with chest x-ray, which was unremarkable, and PFTs that revealed concern for small airway obstructive lung disease.  Inhaler was never tried.  Given positive response to bronchodilator, concern for asthma component although this does not fit well with productive cough.  Patient does not have smoking history to explain COPD.  BAL/sputum culture may be helpful.  MAC would be unusual without history of structural lung disease  -     Start fluticasone-salmeterol (Advair HFA) 115-21 MCG/ACT inhaler; Inhale 2 puffs 2 (Two) Times a Day.  Dispense: 12 g; Refill: 11  -     Continue montelukast (Singulair) 10 MG tablet; Take 1 tablet by mouth Every Night.  Dispense: 90 tablet; Refill: 1  - Consider pulmonology referral if not improving    4. Gastroesophageal reflux disease, unspecified whether esophagitis present   -Continue home famotidine 40 mg daily    5. Thyroid nodule: 2.4 cm right thyroid nodule noted on 2020 ultrasound.  Patient reports he has had FNA and serial ultrasound for the nodule with ENT   -Continue ENT [id number]-[id number]-Pi. K    6. Bradycardia   -Monitor    7. Allergic rhinitis, unspecified seasonality, unspecified trigger  -     Continue montelukast (Singulair) 10 MG tablet; Take 1 tablet by mouth Every Night.  Dispense: 90 tablet; Refill: 1  - Continue home antihistamine and nasal spray  - Continue ENT follow-up    8. Screening PSA (prostate specific antigen)  -     PSA Screen    Preventative:  Colonoscopy: 3/2020, due 3/3025  PSA: 0.783 (2020), Ordered today  Shingles: Completed- 12/2019 and 2/2020  Pneumonia: due 2023  Tdap: 9/2016  Influenza: 10/2021, recommended  COVID: Completed 3 shots Pfizer (10/2021)       Return in about 6 weeks (around 6/6/2022) for Recheck- 15min- cough.

## 2022-04-27 LAB
SARS-COV-2 AB SERPL IA-ACNC: 6829 U/ML
SARS-COV-2 AB SERPL IA-ACNC: NORMAL U/ML
SARS-COV-2 AB SERPL QL IA: NEGATIVE
SARS-COV-2 AB SERPL-IMP: POSITIVE

## 2022-04-29 ENCOUNTER — PATIENT ROUNDING (BHMG ONLY) (OUTPATIENT)
Dept: FAMILY MEDICINE CLINIC | Facility: CLINIC | Age: 67
End: 2022-04-29

## 2022-04-29 NOTE — PROGRESS NOTES
April 29, 2022    Pixel[employer] message sent to Nehemias Strong Jr.    From: [facility] [place]  [facility]  [address]   [place] [id number].

## 2022-05-10 ENCOUNTER — TELEPHONE (OUTPATIENT)
Dept: FAMILY MEDICINE CLINIC | Facility: CLINIC | Age: 67
End: 2022-05-10

## 2022-05-10 NOTE — TELEPHONE ENCOUNTER
Caller: Nehemias Strong Jr.    Relationship: Self    Best call back number: [phone]    What orders are you requesting (i.e. lab or imaging): INFUSION FOR COVID    In what timeframe would the patient need to come in: AS SOON AS POSSIBLE    Where will you receive your lab/imaging services: [facility]    Additional notes: TESTED POSITIVE WITH HOME TEST 5/10/22

## 2022-05-10 NOTE — TELEPHONE ENCOUNTER
He will need a PCR test to confirm before even being considered. Will also need to know date of the start of symptoms as well as what his current symptoms are- antibody infusions are limited and generally saved for those that are immunocompromised or significant comorbities

## 2022-05-11 ENCOUNTER — HOSPITAL ENCOUNTER (OUTPATIENT)
Dept: INFUSION THERAPY | Facility: HOSPITAL | Age: 67
Discharge: HOME | End: 2022-05-11
Admitting: FAMILY MEDICINE

## 2022-05-11 ENCOUNTER — TELEPHONE (OUTPATIENT)
Dept: FAMILY MEDICINE CLINIC | Facility: CLINIC | Age: 67
End: 2022-05-11

## 2022-05-11 VITALS
SYSTOLIC BLOOD PRESSURE: 115 MMHG | HEART RATE: 64 BPM | TEMPERATURE: 97.9 F | OXYGEN SATURATION: 94 % | DIASTOLIC BLOOD PRESSURE: 73 MMHG | RESPIRATION RATE: 16 BRPM

## 2022-05-11 DIAGNOSIS — U07.1 POSITIVE SELF-ADMINISTERED ANTIGEN TEST FOR COVID-19: Primary | ICD-10-CM

## 2022-05-11 DIAGNOSIS — U07.1 CLINICAL DIAGNOSIS OF SEVERE ACUTE RESPIRATORY SYNDROME CORONAVIRUS 2 (SARS-COV-2) DISEASE: Primary | ICD-10-CM

## 2022-05-11 PROCEDURE — M0222 HC INJECTION BEBTELOVIMAB: HCPCS | Performed by: FAMILY MEDICINE

## 2022-05-11 PROCEDURE — 36415 COLL VENOUS BLD VENIPUNCTURE: CPT

## 2022-05-11 PROCEDURE — 96374 THER/PROPH/DIAG INJ IV PUSH: CPT

## 2022-05-11 PROCEDURE — 25010000002 INJECTION, BEBTELOVIMAB, 175 MG: Performed by: FAMILY MEDICINE

## 2022-05-11 RX ORDER — PREDNISONE 20 MG/1
40 TABLET ORAL DAILY
Qty: 10 TABLET | Refills: 0 | Status: SHIPPED | OUTPATIENT
Start: 2022-05-11 | End: 2022-06-10

## 2022-05-11 RX ORDER — DIPHENHYDRAMINE HYDROCHLORIDE 50 MG/ML
50 INJECTION INTRAMUSCULAR; INTRAVENOUS ONCE AS NEEDED
OUTPATIENT
Start: 2022-05-11

## 2022-05-11 RX ORDER — BEBTELOVIMAB 87.5 MG/ML
175 INJECTION, SOLUTION INTRAVENOUS ONCE
Qty: 2 ML | Refills: 0 | Status: SHIPPED | OUTPATIENT
Start: 2022-05-11 | End: 2022-05-11

## 2022-05-11 RX ORDER — METHYLPREDNISOLONE SODIUM SUCCINATE 125 MG/2ML
125 INJECTION, POWDER, LYOPHILIZED, FOR SOLUTION INTRAMUSCULAR; INTRAVENOUS AS NEEDED
Status: CANCELLED | OUTPATIENT
Start: 2022-05-11

## 2022-05-11 RX ORDER — BEBTELOVIMAB 87.5 MG/ML
175 INJECTION, SOLUTION INTRAVENOUS ONCE
Status: CANCELLED | OUTPATIENT
Start: 2022-05-11

## 2022-05-11 RX ORDER — EPINEPHRINE 1 MG/ML
0.3 INJECTION, SOLUTION INTRAMUSCULAR; SUBCUTANEOUS AS NEEDED
Status: CANCELLED | OUTPATIENT
Start: 2022-05-11

## 2022-05-11 RX ORDER — DIPHENHYDRAMINE HCL 25 MG
50 TABLET ORAL ONCE AS NEEDED
Status: CANCELLED | OUTPATIENT
Start: 2022-05-11

## 2022-05-11 RX ORDER — AZITHROMYCIN 500 MG/1
500 TABLET, FILM COATED ORAL DAILY
Qty: 3 TABLET | Refills: 0 | Status: SHIPPED | OUTPATIENT
Start: 2022-05-11 | End: 2022-06-10

## 2022-05-11 RX ORDER — SODIUM CHLORIDE 9 MG/ML
30 INJECTION, SOLUTION INTRAVENOUS ONCE
OUTPATIENT
Start: 2022-05-11 | End: 2022-05-11

## 2022-05-11 RX ORDER — BEBTELOVIMAB 87.5 MG/ML
175 INJECTION, SOLUTION INTRAVENOUS ONCE
Status: COMPLETED | OUTPATIENT
Start: 2022-05-11 | End: 2022-05-11

## 2022-05-11 RX ORDER — METHYLPREDNISOLONE SODIUM SUCCINATE 125 MG/2ML
125 INJECTION, POWDER, LYOPHILIZED, FOR SOLUTION INTRAMUSCULAR; INTRAVENOUS AS NEEDED
OUTPATIENT
Start: 2022-05-11

## 2022-05-11 RX ORDER — DIPHENHYDRAMINE HCL 25 MG
50 CAPSULE ORAL ONCE AS NEEDED
OUTPATIENT
Start: 2022-05-11

## 2022-05-11 RX ORDER — SODIUM CHLORIDE 9 MG/ML
30 INJECTION, SOLUTION INTRAVENOUS ONCE
Status: CANCELLED | OUTPATIENT
Start: 2022-05-11 | End: 2022-05-11

## 2022-05-11 RX ORDER — EPINEPHRINE 1 MG/ML
0.3 INJECTION, SOLUTION INTRAMUSCULAR; SUBCUTANEOUS AS NEEDED
OUTPATIENT
Start: 2022-05-11

## 2022-05-11 RX ORDER — DIPHENHYDRAMINE HYDROCHLORIDE 50 MG/ML
50 INJECTION INTRAMUSCULAR; INTRAVENOUS ONCE AS NEEDED
Status: CANCELLED | OUTPATIENT
Start: 2022-05-11

## 2022-05-11 RX ADMIN — BEBTELOVIMAB 175 MG: 87.5 INJECTION, SOLUTION INTRAVENOUS at 14:03

## 2022-05-11 NOTE — TELEPHONE ENCOUNTER
Pharmacy Name:  [facility]    Pharmacy representative name: SADIE    Pharmacy representative phone number: [phone]    What medication are you calling in regards to: bebtelovimab 175 MG/2ML injection        What question does the pharmacy have: THEY DO NOT HAVE THIS OR ANY INTRAVENOUS MEDICATIONS    Who is the provider that prescribed the medication: DR ROSARIO    Additional notes:

## 2022-06-10 ENCOUNTER — OFFICE VISIT (OUTPATIENT)
Dept: FAMILY MEDICINE CLINIC | Facility: CLINIC | Age: 67
End: 2022-06-10

## 2022-06-10 VITALS
TEMPERATURE: 97.1 F | WEIGHT: 224 LBS | HEART RATE: 59 BPM | RESPIRATION RATE: 16 BRPM | BODY MASS INDEX: 29.69 KG/M2 | HEIGHT: 73 IN | SYSTOLIC BLOOD PRESSURE: 135 MMHG | DIASTOLIC BLOOD PRESSURE: 79 MMHG | OXYGEN SATURATION: 98 %

## 2022-06-10 DIAGNOSIS — J44.9 OBSTRUCTIVE LUNG DISEASE (GENERALIZED): Primary | ICD-10-CM

## 2022-06-10 DIAGNOSIS — R09.89 CHRONIC THROAT CLEARING: ICD-10-CM

## 2022-06-10 PROCEDURE — 99213 OFFICE O/P EST LOW 20 MIN: CPT | Performed by: FAMILY MEDICINE

## 2022-06-10 NOTE — PROGRESS NOTES
Chief Complaint   Patient presents with   • Cough     HPI  Nehemias Strong Jr. is a 66 y.o. male that presents for   Chief Complaint   Patient presents with   • Cough     Cough: patient was started on Advair BID at last visit. However, he was diagnosed w/ COVID in early May, shortly after the visit. Patient subsequently discontinued Advair during that time and has not restarted. At this point, he feels his breathing is back to a pre-COVID state. Patient continues to have a lot of throat clearing but not much cough. No considerable SOB. He is still taking Singulair and antihistamine. He is following w/ ENT- Dr PRATHER.     Review of Systems   Constitutional: Negative for fever and unexpected weight loss.   HENT: Negative for sore throat and trouble swallowing.    Respiratory: Negative for cough and shortness of breath.    Cardiovascular: Negative for chest pain and palpitations.     The following portions of the patient's history were reviewed and updated as appropriate: problem list, past medical history, past surgical history, allergies, current medication    Problem List Tab  Patient History Tab  Immunizations Tab  Medications Tab  Chart Review Tab  Care Everywhere Tab  Synopsis Tab    PE  Vitals:    06/10/22 0847   BP: 135/79   Pulse: 59   Resp: 16   Temp: 97.1 °F (36.2 °C)   SpO2: 98%     Body mass index is 29.56 kg/m².  General: Well nourished, NAD  Head: AT/NC  Eyes: EOMI, anicteric sclera  Resp: CTAB, SCR, BS equal  CV: RRR w/o m/r/g; 2+ pulses  GI: Soft, NT/ND, +BS  MSK: FROM, no deformity, no edema  Skin: Warm, dry, intact  Neuro: Alert and oriented. No focal deficits  Psych: Appropriate mood and affect    Imaging  XR Chest 2 View    Result Date: 4/1/2022  No acute cardiopulmonary process.  Electronically Signed By-Beau Oneill MD On:4/1/2022 12:59 PM This report was finalized on [id number] by  Beau Oneill MD.      Assessment & Plan   Nehemias Strong Jr. is a 66 y.o. male that presents for   Chief  Complaint   Patient presents with   • Cough     Diagnoses and all orders for this visit:    1. Obstructive lung disease (generalized) (HCC) (Primary): As seen on PFTs.  No symptoms at this time following COVID infection 1 month ago   -Recommend starting Advair twice daily if developing cough/shortness of breath   -Monitor    2. Chronic throat clearing: Unclear etiology   -Continue home antihistamine, nasal rinse, Singulair   -Continue home famotidine   -Recommend ENT dmaqha-[id number]-Ts. K     Return in about 6 months (around 12/10/2022).

## 2022-07-20 DIAGNOSIS — I10 PRIMARY HYPERTENSION: ICD-10-CM

## 2022-07-20 RX ORDER — LOSARTAN POTASSIUM 25 MG/1
25 TABLET ORAL DAILY
Qty: 90 TABLET | Refills: 1 | Status: SHIPPED | OUTPATIENT
Start: 2022-07-20

## 2022-08-15 ENCOUNTER — OFFICE VISIT (OUTPATIENT)
Dept: FAMILY MEDICINE CLINIC | Facility: CLINIC | Age: 67
End: 2022-08-15

## 2022-08-15 VITALS
TEMPERATURE: 96.9 F | DIASTOLIC BLOOD PRESSURE: 87 MMHG | HEIGHT: 73 IN | BODY MASS INDEX: 29.95 KG/M2 | WEIGHT: 226 LBS | RESPIRATION RATE: 16 BRPM | SYSTOLIC BLOOD PRESSURE: 113 MMHG | HEART RATE: 61 BPM | OXYGEN SATURATION: 98 %

## 2022-08-15 DIAGNOSIS — K42.9 UMBILICAL HERNIA WITHOUT OBSTRUCTION AND WITHOUT GANGRENE: Primary | ICD-10-CM

## 2022-08-15 DIAGNOSIS — S33.5XXA SPRAIN OF LOW BACK, INITIAL ENCOUNTER: ICD-10-CM

## 2022-08-15 PROCEDURE — 99213 OFFICE O/P EST LOW 20 MIN: CPT | Performed by: FAMILY MEDICINE

## 2022-08-15 RX ORDER — METHYLPREDNISOLONE 4 MG/1
TABLET ORAL
Qty: 21 TABLET | Refills: 0 | Status: SHIPPED | OUTPATIENT
Start: 2022-08-15 | End: 2022-09-12

## 2022-08-15 NOTE — PROGRESS NOTES
Chief Complaint   Patient presents with   • Mass     HPI  Nehemias Strong Jr. is a 66 y.o. male that presents for   Chief Complaint   Patient presents with   • Mass     Mass: patient reports recently throwing grandson in pool. He began feeling poorly and noticed a mass around his umbilicus. He is interested in having evaluation for umbilical hernia today. Patient is using abdominal binder, which does seem to offer support. He does report some mild umbilical discomfort    Review of Systems   Gastrointestinal: Positive for abdominal distention and abdominal pain.     The following portions of the patient's history were reviewed and updated as appropriate: problem list, past medical history, past surgical history, allergies, current medication    Problem List Tab  Patient History Tab  Immunizations Tab  Medications Tab  Chart Review Tab  Care Everywhere Tab  Synopsis Tab    PE  Vitals:    08/15/22 1422   BP: 113/87   Pulse: 61   Resp: 16   Temp: 96.9 °F (36.1 °C)   SpO2: 98%     Body mass index is 29.82 kg/m².  General: Well nourished, NAD  Head: AT/NC  Eyes: EOMI, anicteric sclera  Resp: CTAB, SCR, BS equal  CV: RRR w/o m/r/g; 2+ pulses  GI: Soft, NT/ND, +BS. Easily reducible umbilical hernia. Estimated 2 cm but difficult to fully palpate defect due to discomfort  MSK: FROM, no deformity, no edema  Skin: Warm, dry, intact  Neuro: Alert and oriented. No focal deficits  Psych: Appropriate mood and affect    Imaging  No Images in the past 120 days found..    Assessment & Plan   Nehemias Strong Jr. is a 66 y.o. male that presents for   Chief Complaint   Patient presents with   • Mass     Diagnoses and all orders for this visit:    1. Umbilical hernia without obstruction and without gangrene (Primary)  -     Ambulatory Referral to General Surgery    2. Sprain of low back, initial encounter  -     methylPREDNISolone (MEDROL) 4 MG dose pack; Take as directed on package instructions. Take 6 tablets on first day and one  fewer tablet on each subsequent day  Dispense: 21 tablet; Refill: 0           Return if symptoms worsen or fail to improve.

## 2022-08-17 ENCOUNTER — TELEPHONE (OUTPATIENT)
Dept: SURGERY | Facility: CLINIC | Age: 67
End: 2022-08-17

## 2022-08-17 NOTE — TELEPHONE ENCOUNTER
Caller: Nehemias Strong Jr.    Relationship: Self    Best call back number: [phone]    What was the call regarding: PATIENT HAS A CONSULTATION APPT WITH DR. CRUZ SCHEDULED FOR 09/12/22 FOR AN UMBILICAL HERNIA AND WANTS TO KNOW HOW SOON HE MAY BE ABLE TO SCHEDULE SURGERY AFTER THE CONSULTATION.     Do you require a callback: YES

## 2022-09-12 ENCOUNTER — OFFICE VISIT (OUTPATIENT)
Dept: SURGERY | Facility: CLINIC | Age: 67
End: 2022-09-12

## 2022-09-12 VITALS
HEART RATE: 96 BPM | HEIGHT: 73 IN | OXYGEN SATURATION: 96 % | BODY MASS INDEX: 30.09 KG/M2 | WEIGHT: 227 LBS | TEMPERATURE: 98 F | SYSTOLIC BLOOD PRESSURE: 137 MMHG | DIASTOLIC BLOOD PRESSURE: 90 MMHG

## 2022-09-12 DIAGNOSIS — K42.9 UMBILICAL HERNIA WITHOUT OBSTRUCTION AND WITHOUT GANGRENE: Primary | ICD-10-CM

## 2022-09-12 PROCEDURE — 99214 OFFICE O/P EST MOD 30 MIN: CPT | Performed by: SURGERY

## 2022-09-12 NOTE — PROGRESS NOTES
CHIEF COMPLAINT:    Umbilical hernia    HISTORY OF PRESENT ILLNESS:    Nehemias Strong Jr. is a 66 y.o. male who presents today with complaints of of umbilical pain and swelling.  The patient states that he recently was playing with his grandchildren in a pool and picking them up.  Following this he noted severe sharp pain in the umbilicus along with a knot in the area.  The pain has subsided somewhat but he still has pain and pressure in the area.  He notes no obstructive symptoms.    Past Medical History:   Diagnosis Date   • Bradycardia 08/16/2019    Followed by cardiologist with holter monitor every 6 months.   • Cardiac arrhythmia 08/16/2019   • COVID    • Gastroesophageal reflux disease 01/14/2019   • Hyperlipidemia 08/16/2019   • Kidney stone     follows w/ Thaddeus   • Onychomycosis     follow w/ podiatry- Dr Stock   • Rectal prolapse 05/01/2018   • Sleep apnea     Cpap   • Thyroid nodule     Dr PRATHER       Past Surgical History:   Procedure Laterality Date   • COLONOSCOPY     • DENTAL PROCEDURE     • FINGER SURGERY  03/2022    cyst removal   • HERNIA REPAIR Right     inguinal w/ Lilia   • MASS EXCISION N/A 01/12/2021    Procedure: lipoma excisions to abdomen, bilateral upper lateral thighs and bialteral arms;  Surgeon: Williams Stafford DO;  Location: [facility] MAIN OR;  Service: General;  Laterality: N/A;   • US GUIDED FINE NEEDLE ASPIRATION      Dr PRATHER       Prior to Admission medications    Medication Sig Start Date End Date Taking? Authorizing Provider   aspirin 81 MG chewable tablet Chew 81 mg Daily.   Yes Emergency, Nurse Saul, RN   famotidine (PEPCID) 40 MG tablet Take 40 mg by mouth Daily.   Yes Provider, MD Ritesh   losartan (COZAAR) 25 MG tablet TAKE 1 TABLET BY MOUTH DAILY 7/20/22  Yes Jona Vieira MD   montelukast (Singulair) 10 MG tablet Take 1 tablet by mouth Every Night. 4/25/22  Yes Jona Vieira MD   Multiple Vitamin (MULTIVITAMIN) capsule Take 1 capsule by mouth Daily.    Yes Emergency, Nurse Saul, RN   RA NASAL ALLERGY 55 MCG/ACT nasal inhaler 2 sprays Daily. 20  Yes Provider, MD Ritesh   Sodium Chloride-Sodium Bicarb (AYR SALINE NASAL RINSE NA) into the nostril(s) as directed by provider.   Yes Provider, MD Ritesh   fluticasone-salmeterol (Advair HFA) 115-21 MCG/ACT inhaler Inhale 2 puffs 2 (Two) Times a Day. 22   Jona Vieira MD   methylPREDNISolone (MEDROL) 4 MG dose pack Take as directed on package instructions. Take 6 tablets on first day and one fewer tablet on each subsequent day 8/15/22   Jona Vieira MD       No Known Allergies    Family History   Problem Relation Age of Onset   • Lung cancer Mother         large cell   • Cancer Mother         Large cell melanoma from lung cancer, smoking related. , 3/26/94   • No Known Problems Father    • Birth defects Brother         hole between left and right ventricles at birth.  at age 6 in , 3 days after experimental surgery.   • No Known Problems Maternal Grandmother    • No Known Problems Maternal Grandfather    • No Known Problems Paternal Grandmother    • No Known Problems Paternal Grandfather    • No Known Problems Sister        Social History     Socioeconomic History   • Marital status:    • Number of children: 7   Tobacco Use   • Smoking status: Never Smoker   • Smokeless tobacco: Never Used   Vaping Use   • Vaping Use: Never used   Substance and Sexual Activity   • Alcohol use: No   • Drug use: Never   • Sexual activity: Not Currently     Partners: Female     Birth control/protection: Condom       Review of Systems   Constitutional: Negative for fever.   Gastrointestinal: Positive for abdominal pain. Negative for constipation, diarrhea, nausea and vomiting.   Genitourinary: Negative for dysuria, frequency and hematuria.   Musculoskeletal: Positive for myalgias. Negative for arthralgias.   Neurological: Negative for headaches.       Objective     /90    "Pulse 96   Temp 98 °F (36.7 °C) (Infrared)   Ht 185.4 cm (73\")   Wt 103 kg (227 lb)   SpO2 96%   BMI 29.95 kg/m²     Physical Exam  Constitutional:       General: He is not in acute distress.     Appearance: Normal appearance. He is not ill-appearing, toxic-appearing or diaphoretic.   HENT:      Head: Normocephalic and atraumatic.   Eyes:      General: No scleral icterus.        Right eye: No discharge.         Left eye: No discharge.      Extraocular Movements: Extraocular movements intact.      Conjunctiva/sclera: Conjunctivae normal.   Pulmonary:      Effort: Pulmonary effort is normal. No respiratory distress.   Abdominal:      Palpations: Abdomen is soft.      Hernia: A hernia is present. Hernia is present in the umbilical area.   Skin:     General: Skin is warm.      Coloration: Skin is not jaundiced.   Neurological:      General: No focal deficit present.      Mental Status: He is alert and oriented to person, place, and time.   Psychiatric:         Mood and Affect: Mood normal.         Behavior: Behavior normal.         Thought Content: Thought content normal.         Judgment: Judgment normal.           ASSESSMENT:    Symptomatic umbilical hernia    PLAN:    The patient has a symptomatic umbilical hernia which is reducible but is quite tender.  He would like to have this repaired which I believe is reasonable.  The risks and benefits of open umbilical hernia repair with mesh placement were discussed with him.  He has been scheduled.          This document has been electronically signed by Robe Torres MD on September 12, 2022 08:38 EDT      "

## 2022-09-26 ENCOUNTER — HOSPITAL ENCOUNTER (OUTPATIENT)
Dept: CARDIOLOGY | Facility: HOSPITAL | Age: 67
Discharge: HOME | End: 2022-09-26

## 2022-09-26 ENCOUNTER — LAB (OUTPATIENT)
Dept: LAB | Facility: HOSPITAL | Age: 67
End: 2022-09-26

## 2022-09-26 LAB
ANION GAP SERPL CALCULATED.3IONS-SCNC: 8 MMOL/L (ref 5–15)
BUN SERPL-MCNC: 14 MG/DL (ref 8–23)
BUN/CREAT SERPL: 11.1 (ref 7–25)
CALCIUM SPEC-SCNC: 9.1 MG/DL (ref 8.6–10.5)
CHLORIDE SERPL-SCNC: 104 MMOL/L (ref 98–107)
CO2 SERPL-SCNC: 28 MMOL/L (ref 22–29)
CREAT SERPL-MCNC: 1.26 MG/DL (ref 0.76–1.27)
DEPRECATED RDW RBC AUTO: 40.3 FL (ref 37–54)
EGFRCR SERPLBLD CKD-EPI 2021: 63.7 ML/MIN/1.73
ERYTHROCYTE [DISTWIDTH] IN BLOOD BY AUTOMATED COUNT: 12.7 % (ref 12.3–15.4)
GLUCOSE SERPL-MCNC: 98 MG/DL (ref 65–99)
HCT VFR BLD AUTO: 45.4 % (ref 37.5–51)
HGB BLD-MCNC: 15.7 G/DL (ref 13–17.7)
MCH RBC QN AUTO: 29.7 PG (ref 26.6–33)
MCHC RBC AUTO-ENTMCNC: 34.6 G/DL (ref 31.5–35.7)
MCV RBC AUTO: 85.8 FL (ref 79–97)
MRSA DNA SPEC QL NAA+PROBE: NORMAL
PLATELET # BLD AUTO: 195 10*3/MM3 (ref 140–450)
PMV BLD AUTO: 9.9 FL (ref 6–12)
POTASSIUM SERPL-SCNC: 4 MMOL/L (ref 3.5–5.2)
RBC # BLD AUTO: 5.29 10*6/MM3 (ref 4.14–5.8)
SODIUM SERPL-SCNC: 140 MMOL/L (ref 136–145)
WBC NRBC COR # BLD: 6.91 10*3/MM3 (ref 3.4–10.8)

## 2022-09-26 PROCEDURE — 93005 ELECTROCARDIOGRAM TRACING: CPT | Performed by: SURGERY

## 2022-09-26 PROCEDURE — 85027 COMPLETE CBC AUTOMATED: CPT

## 2022-09-26 PROCEDURE — 93010 ELECTROCARDIOGRAM REPORT: CPT | Performed by: INTERNAL MEDICINE

## 2022-09-26 PROCEDURE — 80048 BASIC METABOLIC PNL TOTAL CA: CPT

## 2022-09-26 PROCEDURE — 87641 MR-STAPH DNA AMP PROBE: CPT | Performed by: SURGERY

## 2022-09-29 ENCOUNTER — ANESTHESIA EVENT (OUTPATIENT)
Dept: PERIOP | Facility: HOSPITAL | Age: 67
End: 2022-09-29

## 2022-09-29 LAB — QT INTERVAL: 412 MS

## 2022-09-30 ENCOUNTER — HOSPITAL ENCOUNTER (OUTPATIENT)
Facility: HOSPITAL | Age: 67
Setting detail: HOSPITAL OUTPATIENT SURGERY
Discharge: HOME | End: 2022-09-30
Attending: SURGERY | Admitting: SURGERY

## 2022-09-30 ENCOUNTER — ANESTHESIA (OUTPATIENT)
Dept: PERIOP | Facility: HOSPITAL | Age: 67
End: 2022-09-30

## 2022-09-30 VITALS
TEMPERATURE: 97.2 F | SYSTOLIC BLOOD PRESSURE: 133 MMHG | DIASTOLIC BLOOD PRESSURE: 83 MMHG | WEIGHT: 222 LBS | RESPIRATION RATE: 10 BRPM | BODY MASS INDEX: 29.42 KG/M2 | HEART RATE: 57 BPM | HEIGHT: 73 IN | OXYGEN SATURATION: 97 %

## 2022-09-30 DIAGNOSIS — K42.9 UMBILICAL HERNIA WITHOUT OBSTRUCTION AND WITHOUT GANGRENE: ICD-10-CM

## 2022-09-30 PROCEDURE — 25010000002 ONDANSETRON PER 1 MG: Performed by: NURSE ANESTHETIST, CERTIFIED REGISTERED

## 2022-09-30 PROCEDURE — 25010000002 CEFAZOLIN PER 500 MG: Performed by: SURGERY

## 2022-09-30 PROCEDURE — 25010000002 FENTANYL CITRATE (PF) 100 MCG/2ML SOLUTION: Performed by: NURSE ANESTHETIST, CERTIFIED REGISTERED

## 2022-09-30 PROCEDURE — 49585 PR REPAIR UMBILICAL HERN,5+Y/O,REDUC: CPT | Performed by: SURGERY

## 2022-09-30 PROCEDURE — C1781 MESH (IMPLANTABLE): HCPCS | Performed by: SURGERY

## 2022-09-30 PROCEDURE — 25010000002 PROPOFOL 10 MG/ML EMULSION: Performed by: NURSE ANESTHETIST, CERTIFIED REGISTERED

## 2022-09-30 PROCEDURE — 25010000002 KETOROLAC TROMETHAMINE PER 15 MG: Performed by: NURSE ANESTHETIST, CERTIFIED REGISTERED

## 2022-09-30 DEVICE — VENTRALEX ST HERNIA PATCH
Type: IMPLANTABLE DEVICE | Site: ABDOMEN | Status: FUNCTIONAL
Brand: VENTRALEX ST HERNIA PATCH

## 2022-09-30 RX ORDER — KETOROLAC TROMETHAMINE 30 MG/ML
INJECTION, SOLUTION INTRAMUSCULAR; INTRAVENOUS AS NEEDED
Status: DISCONTINUED | OUTPATIENT
Start: 2022-09-30 | End: 2022-09-30 | Stop reason: SURG

## 2022-09-30 RX ORDER — ACETAMINOPHEN 325 MG/1
650 TABLET ORAL ONCE AS NEEDED
Status: DISCONTINUED | OUTPATIENT
Start: 2022-09-30 | End: 2022-09-30 | Stop reason: HOSPADM

## 2022-09-30 RX ORDER — PROMETHAZINE HYDROCHLORIDE 25 MG/1
25 TABLET ORAL ONCE AS NEEDED
Status: DISCONTINUED | OUTPATIENT
Start: 2022-09-30 | End: 2022-09-30 | Stop reason: HOSPADM

## 2022-09-30 RX ORDER — PROMETHAZINE HYDROCHLORIDE 25 MG/1
25 SUPPOSITORY RECTAL ONCE AS NEEDED
Status: DISCONTINUED | OUTPATIENT
Start: 2022-09-30 | End: 2022-09-30 | Stop reason: HOSPADM

## 2022-09-30 RX ORDER — LIDOCAINE HYDROCHLORIDE 10 MG/ML
0.5 INJECTION, SOLUTION INFILTRATION; PERINEURAL ONCE AS NEEDED
Status: DISCONTINUED | OUTPATIENT
Start: 2022-09-30 | End: 2022-09-30 | Stop reason: HOSPADM

## 2022-09-30 RX ORDER — FENTANYL CITRATE 50 UG/ML
INJECTION, SOLUTION INTRAMUSCULAR; INTRAVENOUS AS NEEDED
Status: DISCONTINUED | OUTPATIENT
Start: 2022-09-30 | End: 2022-09-30 | Stop reason: SURG

## 2022-09-30 RX ORDER — OXYCODONE HYDROCHLORIDE 5 MG/1
5 TABLET ORAL ONCE AS NEEDED
Status: DISCONTINUED | OUTPATIENT
Start: 2022-09-30 | End: 2022-09-30 | Stop reason: HOSPADM

## 2022-09-30 RX ORDER — BUPIVACAINE HYDROCHLORIDE AND EPINEPHRINE 5; 5 MG/ML; UG/ML
INJECTION, SOLUTION EPIDURAL; INTRACAUDAL; PERINEURAL AS NEEDED
Status: DISCONTINUED | OUTPATIENT
Start: 2022-09-30 | End: 2022-09-30 | Stop reason: HOSPADM

## 2022-09-30 RX ORDER — HYDROCODONE BITARTRATE AND ACETAMINOPHEN 5; 325 MG/1; MG/1
1 TABLET ORAL EVERY 6 HOURS PRN
Qty: 20 TABLET | Refills: 0 | Status: SHIPPED | OUTPATIENT
Start: 2022-09-30 | End: 2022-10-31

## 2022-09-30 RX ORDER — ONDANSETRON 2 MG/ML
4 INJECTION INTRAMUSCULAR; INTRAVENOUS ONCE AS NEEDED
Status: DISCONTINUED | OUTPATIENT
Start: 2022-09-30 | End: 2022-09-30 | Stop reason: HOSPADM

## 2022-09-30 RX ORDER — SODIUM CHLORIDE 0.9 % (FLUSH) 0.9 %
10 SYRINGE (ML) INJECTION AS NEEDED
Status: DISCONTINUED | OUTPATIENT
Start: 2022-09-30 | End: 2022-09-30 | Stop reason: HOSPADM

## 2022-09-30 RX ORDER — ONDANSETRON 2 MG/ML
INJECTION INTRAMUSCULAR; INTRAVENOUS AS NEEDED
Status: DISCONTINUED | OUTPATIENT
Start: 2022-09-30 | End: 2022-09-30 | Stop reason: SURG

## 2022-09-30 RX ORDER — PROPOFOL 10 MG/ML
VIAL (ML) INTRAVENOUS AS NEEDED
Status: DISCONTINUED | OUTPATIENT
Start: 2022-09-30 | End: 2022-09-30 | Stop reason: SURG

## 2022-09-30 RX ORDER — DIPHENHYDRAMINE HYDROCHLORIDE 50 MG/ML
12.5 INJECTION INTRAMUSCULAR; INTRAVENOUS ONCE AS NEEDED
Status: DISCONTINUED | OUTPATIENT
Start: 2022-09-30 | End: 2022-09-30 | Stop reason: HOSPADM

## 2022-09-30 RX ORDER — FENTANYL CITRATE 50 UG/ML
25 INJECTION, SOLUTION INTRAMUSCULAR; INTRAVENOUS
Status: DISCONTINUED | OUTPATIENT
Start: 2022-09-30 | End: 2022-09-30 | Stop reason: HOSPADM

## 2022-09-30 RX ORDER — ACETAMINOPHEN 650 MG/1
650 SUPPOSITORY RECTAL ONCE AS NEEDED
Status: DISCONTINUED | OUTPATIENT
Start: 2022-09-30 | End: 2022-09-30 | Stop reason: HOSPADM

## 2022-09-30 RX ORDER — SODIUM CHLORIDE, SODIUM LACTATE, POTASSIUM CHLORIDE, CALCIUM CHLORIDE 600; 310; 30; 20 MG/100ML; MG/100ML; MG/100ML; MG/100ML
1000 INJECTION, SOLUTION INTRAVENOUS CONTINUOUS
Status: DISCONTINUED | OUTPATIENT
Start: 2022-09-30 | End: 2022-09-30 | Stop reason: HOSPADM

## 2022-09-30 RX ORDER — ROCURONIUM BROMIDE 10 MG/ML
INJECTION, SOLUTION INTRAVENOUS AS NEEDED
Status: DISCONTINUED | OUTPATIENT
Start: 2022-09-30 | End: 2022-09-30 | Stop reason: SURG

## 2022-09-30 RX ADMIN — SUGAMMADEX 200 MG: 100 INJECTION, SOLUTION INTRAVENOUS at 09:48

## 2022-09-30 RX ADMIN — LIDOCAINE HYDROCHLORIDE 100 MG: 20 INJECTION, SOLUTION INTRAVENOUS at 09:17

## 2022-09-30 RX ADMIN — ROCURONIUM BROMIDE 50 MG: 10 INJECTION, SOLUTION INTRAVENOUS at 09:17

## 2022-09-30 RX ADMIN — PROPOFOL 150 MG: 10 INJECTION, EMULSION INTRAVENOUS at 09:17

## 2022-09-30 RX ADMIN — FENTANYL CITRATE 100 MCG: 50 INJECTION, SOLUTION INTRAMUSCULAR; INTRAVENOUS at 09:17

## 2022-09-30 RX ADMIN — KETOROLAC TROMETHAMINE 30 MG: 30 INJECTION, SOLUTION INTRAMUSCULAR at 09:43

## 2022-09-30 RX ADMIN — CEFAZOLIN 2 G: 2 INJECTION, POWDER, FOR SOLUTION INTRAMUSCULAR; INTRAVENOUS at 09:18

## 2022-09-30 RX ADMIN — SODIUM CHLORIDE, POTASSIUM CHLORIDE, SODIUM LACTATE AND CALCIUM CHLORIDE 1000 ML: 600; 310; 30; 20 INJECTION, SOLUTION INTRAVENOUS at 07:33

## 2022-09-30 RX ADMIN — ONDANSETRON 4 MG: 2 INJECTION INTRAMUSCULAR; INTRAVENOUS at 09:43

## 2022-09-30 NOTE — ANESTHESIA PREPROCEDURE EVALUATION
Anesthesia Evaluation     Patient summary reviewed and Nursing notes reviewed   no history of anesthetic complications:  NPO Solid Status: > 8 hours  NPO Liquid Status: > 8 hours           Airway   Dental      Pulmonary    (+) COPD, sleep apnea on CPAP,   Cardiovascular     ECG reviewed  PT is on anticoagulation therapy    (+) hypertension, dysrhythmias Bradycardia, hyperlipidemia,       Neuro/Psych  (+) headaches,    GI/Hepatic/Renal/Endo    (+)  GERD,  renal disease stones, thyroid problem thyroid nodules    Musculoskeletal     Abdominal    Substance History      OB/GYN          Other        ROS/Med Hx Other: Umbilical hernia, allergies, migraines, BPH, odynophagia, low vit D    PSH  HERNIA REPAIR COLONOSCOPY  DENTAL PROCEDURE MASS EXCISION  US GUIDED FINE NEEDLE ASPIRATION FINGER SURGERY                  Anesthesia Plan    ASA 3     general     (Patient identified; pre-operative vital signs, all relevant labs/studies, complete medical/surgical/anesthetic history, full medication list, full allergy list, and NPO status obtained/reviewed; physical assessment performed; anesthetic options, side effects, potential complications, risks, and benefits discussed; questions answered; written anesthesia consent obtained; patient cleared for procedure; anesthesia machine and equipment checked and functioning)  intravenous induction     Anesthetic plan, risks, benefits, and alternatives have been provided, discussed and informed consent has been obtained with: patient.    Plan discussed with CRNA and CAA.        CODE STATUS:

## 2022-09-30 NOTE — ANESTHESIA PROCEDURE NOTES
Airway  Date/Time: 9/30/2022 9:21 AM  Airway not difficult    General Information and Staff    CRNA/CAA: Melissa Flores CRNA    Indications and Patient Condition  Indications for airway management: airway protection    Preoxygenated: yes  MILS maintained throughout  Mask difficulty assessment: 1 - vent by mask    Final Airway Details  Final airway type: endotracheal airway      Successful airway: ETT  Cuffed: yes   Successful intubation technique: direct laryngoscopy  Facilitating devices/methods: intubating stylet  Endotracheal tube insertion site: oral  Blade: Carter  Blade size: 2  ETT size (mm): 7.5  Cormack-Lehane Classification: grade IIa - partial view of glottis  Placement verified by: chest auscultation and capnometry   Cuff volume (mL): 6  Measured from: lips  ETT/EBT  to lips (cm): 23  Number of attempts at approach: 1  Assessment: lips, teeth, and gum same as pre-op and atraumatic intubation

## 2022-09-30 NOTE — ANESTHESIA POSTPROCEDURE EVALUATION
Patient: Nehemias Strong Jr.    Procedure Summary     Date: 09/30/22 Room / Location: [facility] OR 09 / [facility] MAIN OR    Anesthesia Start: 0908 Anesthesia Stop: 1002    Procedure: UMBILICAL HERNIA REPAIR with mesh (N/A Abdomen) Diagnosis:       Umbilical hernia without obstruction and without gangrene      (Umbilical hernia without obstruction and without gangrene [K42.9])    Surgeons: Robe Torres MD Provider: Judah Busby MD    Anesthesia Type: general ASA Status: 3          Anesthesia Type: general    Vitals  Vitals Value Taken Time   /76 09/30/22 1043   Temp 97.7 °F (36.5 °C) 09/30/22 1043   Pulse 57 09/30/22 1044   Resp 10 09/30/22 1043   SpO2 96 % 09/30/22 1044   Vitals shown include unvalidated device data.        Post Anesthesia Care and Evaluation    Patient location during evaluation: PACU  Patient participation: complete - patient participated  Level of consciousness: awake  Pain scale: See nurse's notes for pain score.  Pain management: adequate    Airway patency: patent  Anesthetic complications: No anesthetic complications  PONV Status: none  Cardiovascular status: acceptable  Respiratory status: acceptable and spontaneous ventilation  Hydration status: acceptable    Comments: Patient seen and examined postoperatively; vital signs stable; SpO2 greater than or equal to 90%; cardiopulmonary status stable; nausea/vomiting adequately controlled; pain adequately controlled; no apparent anesthesia complications; patient discharged from anesthesia care when discharge criteria were met

## 2022-10-03 ENCOUNTER — TELEPHONE (OUTPATIENT)
Dept: SURGERY | Facility: CLINIC | Age: 67
End: 2022-10-03

## 2022-10-05 ENCOUNTER — TELEPHONE (OUTPATIENT)
Dept: SURGERY | Facility: CLINIC | Age: 67
End: 2022-10-05

## 2022-10-31 ENCOUNTER — OFFICE VISIT (OUTPATIENT)
Dept: SURGERY | Facility: CLINIC | Age: 67
End: 2022-10-31

## 2022-10-31 VITALS
DIASTOLIC BLOOD PRESSURE: 77 MMHG | OXYGEN SATURATION: 98 % | HEART RATE: 71 BPM | TEMPERATURE: 98 F | BODY MASS INDEX: 29.69 KG/M2 | SYSTOLIC BLOOD PRESSURE: 136 MMHG | HEIGHT: 73 IN | WEIGHT: 224 LBS

## 2022-10-31 DIAGNOSIS — Z09 ENCOUNTER FOR FOLLOW-UP: Primary | ICD-10-CM

## 2022-10-31 PROCEDURE — 99024 POSTOP FOLLOW-UP VISIT: CPT | Performed by: SURGERY

## 2022-10-31 NOTE — PROGRESS NOTES
CHIEF COMPLAINT:    Chief Complaint   Patient presents with   • Post-op Hernia     Post op       HISTORY OF PRESENT ILLNESS:    Nehemias Strong Jr. is a 66 y.o. male who underwent open umbilical hernia repair with mesh on 9/30/2022.  He returns today for follow-up.  Overall doing well.    EXAM:  Vitals:    10/31/22 0917   BP: 136/77   Pulse: 71   Temp: 98 °F (36.7 °C)   SpO2: 98%         Abdomen soft, stable diastases of the upper rectus muscles, healing umbilical hernia repair site without evidence of hernia    ASSESSMENT:    Status post open umbilical hernia repair with mesh    PLAN:    Overall doing well.  Can gradually return to normal activities.  See me as needed.          This document has been electronically signed by Robe Torres MD on October 31, 2022 09:42 EDT

## 2022-11-19 DIAGNOSIS — J44.9 OBSTRUCTIVE LUNG DISEASE (GENERALIZED): ICD-10-CM

## 2022-11-19 DIAGNOSIS — J30.9 ALLERGIC RHINITIS, UNSPECIFIED SEASONALITY, UNSPECIFIED TRIGGER: ICD-10-CM

## 2022-11-20 RX ORDER — MONTELUKAST SODIUM 10 MG/1
10 TABLET ORAL NIGHTLY
Qty: 90 TABLET | Refills: 1 | Status: SHIPPED | OUTPATIENT
Start: 2022-11-20

## 2022-12-02 ENCOUNTER — OFFICE (AMBULATORY)
Dept: URBAN - METROPOLITAN AREA PATHOLOGY 4 | Facility: PATHOLOGY | Age: 67
End: 2022-12-02

## 2022-12-02 ENCOUNTER — OFFICE (AMBULATORY)
Dept: URBAN - METROPOLITAN AREA PATHOLOGY 4 | Facility: PATHOLOGY | Age: 67
End: 2022-12-02
Payer: COMMERCIAL

## 2022-12-02 ENCOUNTER — ON CAMPUS - OUTPATIENT (AMBULATORY)
Dept: URBAN - METROPOLITAN AREA HOSPITAL 2 | Facility: HOSPITAL | Age: 67
End: 2022-12-02

## 2022-12-02 VITALS
DIASTOLIC BLOOD PRESSURE: 73 MMHG | SYSTOLIC BLOOD PRESSURE: 126 MMHG | DIASTOLIC BLOOD PRESSURE: 87 MMHG | TEMPERATURE: 98.2 F | RESPIRATION RATE: 18 BRPM | HEART RATE: 88 BPM | OXYGEN SATURATION: 98 % | SYSTOLIC BLOOD PRESSURE: 135 MMHG | SYSTOLIC BLOOD PRESSURE: 128 MMHG | SYSTOLIC BLOOD PRESSURE: 151 MMHG | HEART RATE: 57 BPM | HEIGHT: 73 IN | HEART RATE: 63 BPM | SYSTOLIC BLOOD PRESSURE: 130 MMHG | OXYGEN SATURATION: 99 % | HEART RATE: 70 BPM | WEIGHT: 220 LBS | HEART RATE: 69 BPM | RESPIRATION RATE: 16 BRPM | DIASTOLIC BLOOD PRESSURE: 98 MMHG | DIASTOLIC BLOOD PRESSURE: 79 MMHG | DIASTOLIC BLOOD PRESSURE: 76 MMHG

## 2022-12-02 DIAGNOSIS — K21.9 GASTRO-ESOPHAGEAL REFLUX DISEASE WITHOUT ESOPHAGITIS: ICD-10-CM

## 2022-12-02 DIAGNOSIS — K31.89 OTHER DISEASES OF STOMACH AND DUODENUM: ICD-10-CM

## 2022-12-02 DIAGNOSIS — K20.80 OTHER ESOPHAGITIS WITHOUT BLEEDING: ICD-10-CM

## 2022-12-02 LAB
GI HISTOLOGY: A. SELECT: (no result)
GI HISTOLOGY: PDF REPORT: (no result)

## 2022-12-02 PROCEDURE — 88305 TISSUE EXAM BY PATHOLOGIST: CPT | Mod: 26 | Performed by: INTERNAL MEDICINE

## 2022-12-02 PROCEDURE — 43239 EGD BIOPSY SINGLE/MULTIPLE: CPT | Performed by: INTERNAL MEDICINE

## 2022-12-02 RX ORDER — PANTOPRAZOLE SODIUM 20 MG/1
TABLET, DELAYED RELEASE ORAL
Qty: 90 | Refills: 0 | Status: COMPLETED
End: 2023-12-21

## 2022-12-02 RX ORDER — FAMOTIDINE 40 MG/1
TABLET, FILM COATED ORAL
Refills: 0 | Status: COMPLETED
End: 2022-12-02

## 2023-02-03 ENCOUNTER — HOSPITAL ENCOUNTER (OUTPATIENT)
Facility: HOSPITAL | Age: 68
Discharge: HOME | End: 2023-02-03
Attending: EMERGENCY MEDICINE | Admitting: EMERGENCY MEDICINE
Payer: MEDICARE

## 2023-02-03 VITALS
RESPIRATION RATE: 18 BRPM | HEART RATE: 65 BPM | HEIGHT: 71 IN | TEMPERATURE: 97.7 F | BODY MASS INDEX: 30.38 KG/M2 | SYSTOLIC BLOOD PRESSURE: 155 MMHG | WEIGHT: 217 LBS | DIASTOLIC BLOOD PRESSURE: 91 MMHG | OXYGEN SATURATION: 97 %

## 2023-02-03 DIAGNOSIS — Z20.2 EXPOSURE TO STD: Primary | ICD-10-CM

## 2023-02-03 LAB
C TRACH RRNA CVX QL NAA+PROBE: NOT DETECTED
N GONORRHOEA RRNA SPEC QL NAA+PROBE: NOT DETECTED

## 2023-02-03 PROCEDURE — G0463 HOSPITAL OUTPT CLINIC VISIT: HCPCS | Performed by: EMERGENCY MEDICINE

## 2023-02-03 PROCEDURE — 99203 OFFICE O/P NEW LOW 30 MIN: CPT | Performed by: EMERGENCY MEDICINE

## 2023-02-03 PROCEDURE — 87591 N.GONORRHOEAE DNA AMP PROB: CPT | Performed by: EMERGENCY MEDICINE

## 2023-02-03 PROCEDURE — 87661 TRICHOMONAS VAGINALIS AMPLIF: CPT | Performed by: EMERGENCY MEDICINE

## 2023-02-03 PROCEDURE — 87491 CHLMYD TRACH DNA AMP PROBE: CPT | Performed by: EMERGENCY MEDICINE

## 2023-02-03 NOTE — ED NOTES
Pt reports feeling anxious and reports recent stressors in his life. Pt says he woke up with night sweats and hypertension today.

## 2023-02-03 NOTE — FSED PROVIDER NOTE
Subjective   History of Present Illness  Patient presents with concern that he may have STD after having unprotected sex, he also has lots of stressors in his life going on right now so he presented for these concerns.        Review of Systems   Constitutional: Negative for activity change, appetite change, chills, fatigue and fever.   HENT: Negative for congestion, dental problem, drooling and ear discharge.    Eyes: Negative for pain, discharge and itching.   Respiratory: Negative for apnea, cough, choking and chest tightness.    Cardiovascular: Negative for chest pain and leg swelling.   Gastrointestinal: Negative for abdominal distention, abdominal pain, anal bleeding, blood in stool, constipation and diarrhea.   Endocrine: Negative for cold intolerance, heat intolerance and polydipsia.   Genitourinary: Negative for difficulty urinating, dysuria, enuresis and flank pain.   Musculoskeletal: Negative for arthralgias, back pain, gait problem and joint swelling.   Skin: Negative for color change, pallor and rash.   Allergic/Immunologic: Negative for environmental allergies and food allergies.   Neurological: Negative for dizziness, facial asymmetry, light-headedness and headaches.   Hematological: Negative for adenopathy. Does not bruise/bleed easily.   Psychiatric/Behavioral: Negative for agitation, behavioral problems, confusion and decreased concentration.   All other systems reviewed and are negative.      Past Medical History:   Diagnosis Date   • Bradycardia 08/16/2019    Followed by cardiologist with holter monitor every 6 months.   • Cardiac arrhythmia 08/16/2019   • COVID 05/2022   • Gastroesophageal reflux disease 01/14/2019   • History of kidney stones    • Hyperlipidemia 08/16/2019   • Kidney stone     follows w/ Thaddeus   • Onychomycosis     follow w/ podiatry- Dr Stock   • Rectal prolapse 05/01/2018   • Sleep apnea     Cpap   • Thyroid nodule     Dr PRATHER   • Umbilical hernia 09/2022       No Known  Allergies    Past Surgical History:   Procedure Laterality Date   • COLONOSCOPY     • DENTAL PROCEDURE     • FINGER SURGERY  2022    cyst removal   • HERNIA REPAIR Right     inguinal w/ Lilia   • MASS EXCISION N/A 2021    Procedure: lipoma excisions to abdomen, bilateral upper lateral thighs and bialteral arms;  Surgeon: Williams Stafford DO;  Location: [facility] MAIN OR;  Service: General;  Laterality: N/A;   • UMBILICAL HERNIA REPAIR N/A 2022    Procedure: UMBILICAL HERNIA REPAIR with mesh;  Surgeon: Robe Torres MD;  Location: [facility] MAIN OR;  Service: General;  Laterality: N/A;   • US GUIDED FINE NEEDLE ASPIRATION      Dr PRATHER       Family History   Problem Relation Age of Onset   • Lung cancer Mother         large cell   • Cancer Mother         Large cell melanoma from lung cancer, smoking related. , 3/26/94   • No Known Problems Father    • Birth defects Brother         hole between left and right ventricles at birth.  at age 6 in , 3 days after experimental surgery.   • No Known Problems Maternal Grandmother    • No Known Problems Maternal Grandfather    • No Known Problems Paternal Grandmother    • No Known Problems Paternal Grandfather    • No Known Problems Sister        Social History     Socioeconomic History   • Marital status:    • Number of children: 7   Tobacco Use   • Smoking status: Never   • Smokeless tobacco: Never   Vaping Use   • Vaping Use: Never used   Substance and Sexual Activity   • Alcohol use: No   • Drug use: Never   • Sexual activity: Not Currently     Partners: Female     Birth control/protection: Condom           Objective   Physical Exam  Vitals and nursing note reviewed.   Constitutional:       General: He is not in acute distress.     Appearance: Normal appearance.   HENT:      Head: Normocephalic and atraumatic.      Nose: Nose normal.      Mouth/Throat:      Mouth: Mucous membranes are moist.   Eyes:      Extraocular Movements:  Extraocular movements intact.      Pupils: Pupils are equal, round, and reactive to light.   Cardiovascular:      Rate and Rhythm: Normal rate and regular rhythm.   Pulmonary:      Effort: Pulmonary effort is normal.      Breath sounds: Normal breath sounds.   Abdominal:      General: Abdomen is flat.      Palpations: Abdomen is soft.   Musculoskeletal:      Cervical back: Normal range of motion and neck supple.   Skin:     General: Skin is warm and dry.   Neurological:      General: No focal deficit present.      Mental Status: He is alert and oriented to person, place, and time.   Psychiatric:         Mood and Affect: Mood normal.         Behavior: Behavior normal.         Procedures           ED Course                                           Medical Decision Making  No signs or symptoms of STDs but will check urine and treat if necessary        Final diagnoses:   Exposure to STD       ED Disposition  ED Disposition     ED Disposition   Discharge    Condition   Stable    Comment   --             No follow-up provider specified.       Medication List      Changed    losartan 25 MG tablet  Commonly known as: COZAAR  TAKE 1 TABLET BY MOUTH DAILY  What changed: additional instructions

## 2023-02-06 LAB — T VAGINALIS RRNA SPEC QL NAA+PROBE: NEGATIVE

## 2023-03-17 ENCOUNTER — HOSPITAL ENCOUNTER (OUTPATIENT)
Facility: HOSPITAL | Age: 68
Discharge: HOME | End: 2023-03-17
Attending: EMERGENCY MEDICINE | Admitting: EMERGENCY MEDICINE
Payer: MEDICARE

## 2023-03-17 ENCOUNTER — APPOINTMENT (OUTPATIENT)
Dept: GENERAL RADIOLOGY | Facility: HOSPITAL | Age: 68
End: 2023-03-17
Payer: MEDICARE

## 2023-03-17 VITALS
BODY MASS INDEX: 28.49 KG/M2 | HEART RATE: 78 BPM | OXYGEN SATURATION: 96 % | TEMPERATURE: 98.2 F | RESPIRATION RATE: 18 BRPM | SYSTOLIC BLOOD PRESSURE: 141 MMHG | DIASTOLIC BLOOD PRESSURE: 78 MMHG | WEIGHT: 215 LBS | HEIGHT: 73 IN

## 2023-03-17 DIAGNOSIS — R05.9 COUGH, UNSPECIFIED TYPE: ICD-10-CM

## 2023-03-17 DIAGNOSIS — J40 BRONCHITIS: Primary | ICD-10-CM

## 2023-03-17 LAB
FLUAV SUBTYP SPEC NAA+PROBE: NOT DETECTED
FLUBV RNA ISLT QL NAA+PROBE: NOT DETECTED
SARS-COV-2 RNA RESP QL NAA+PROBE: NOT DETECTED

## 2023-03-17 PROCEDURE — 99213 OFFICE O/P EST LOW 20 MIN: CPT | Performed by: EMERGENCY MEDICINE

## 2023-03-17 PROCEDURE — 87636 SARSCOV2 & INF A&B AMP PRB: CPT | Performed by: EMERGENCY MEDICINE

## 2023-03-17 PROCEDURE — G0463 HOSPITAL OUTPT CLINIC VISIT: HCPCS | Performed by: EMERGENCY MEDICINE

## 2023-03-17 PROCEDURE — C9803 HOPD COVID-19 SPEC COLLECT: HCPCS

## 2023-03-17 PROCEDURE — 71046 X-RAY EXAM CHEST 2 VIEWS: CPT

## 2023-03-17 RX ORDER — DOXYCYCLINE 100 MG/1
100 CAPSULE ORAL 2 TIMES DAILY
Qty: 10 CAPSULE | Refills: 0 | Status: SHIPPED | OUTPATIENT
Start: 2023-03-17 | End: 2023-03-22

## 2023-03-17 RX ORDER — PREDNISONE 50 MG/1
50 TABLET ORAL DAILY
Qty: 5 TABLET | Refills: 0 | Status: SHIPPED | OUTPATIENT
Start: 2023-03-17 | End: 2023-03-22

## 2023-03-17 NOTE — FSED PROVIDER NOTE
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 intake now in ED for further eval        Review of Systems   Constitutional: Negative.    HENT: Negative.    Eyes: Negative.    Respiratory: Positive for cough.    Cardiovascular: Negative.    Gastrointestinal: Negative.    Endocrine: Negative.    Genitourinary: Negative.    Skin: Negative.    Allergic/Immunologic: Negative.    Neurological: Negative.    Hematological: Negative.    Psychiatric/Behavioral: Negative.        Past Medical History:   Diagnosis Date   • Bradycardia 08/16/2019    Followed by cardiologist with holter monitor every 6 months.   • Cardiac arrhythmia 08/16/2019   • COVID 05/2022   • Gastroesophageal reflux disease 01/14/2019   • History of kidney stones    • Hyperlipidemia 08/16/2019   • Kidney stone     follows w/ Thaddeus   • Onychomycosis     follow w/ podiatry- Dr Stock   • Rectal prolapse 05/01/2018   • Sleep apnea     Cpap   • Thyroid nodule     Dr PRATHER   • Umbilical hernia 09/2022       No Known Allergies    Past Surgical History:   Procedure Laterality Date   • COLONOSCOPY     • DENTAL PROCEDURE     • FINGER SURGERY  03/2022    cyst removal   • HERNIA REPAIR Right     inguinal w/ Lilia   • MASS EXCISION N/A 01/12/2021    Procedure: lipoma excisions to abdomen, bilateral upper lateral thighs and bialteral arms;  Surgeon: Williams Stafford DO;  Location: [facility] MAIN OR;  Service: General;  Laterality: N/A;   • UMBILICAL HERNIA REPAIR N/A 9/30/2022    Procedure: UMBILICAL HERNIA REPAIR with mesh;  Surgeon: Robe Torres MD;  Location: [facility] MAIN OR;  Service: General;   Laterality: N/A;   • US GUIDED FINE NEEDLE ASPIRATION      Dr PRATHER       Family History   Problem Relation Age of Onset   • Lung cancer Mother         large cell   • Cancer Mother         Large cell melanoma from lung cancer, smoking related. , 3/26/94   • No Known Problems Father    • Birth defects Brother         hole between left and right ventricles at birth.  at age 6 in , 3 days after experimental surgery.   • No Known Problems Maternal Grandmother    • No Known Problems Maternal Grandfather    • No Known Problems Paternal Grandmother    • No Known Problems Paternal Grandfather    • No Known Problems Sister        Social History     Socioeconomic History   • Marital status:    • Number of children: 7   Tobacco Use   • Smoking status: Never   • Smokeless tobacco: Never   Vaping Use   • Vaping Use: Never used   Substance and Sexual Activity   • Alcohol use: No   • Drug use: Never   • Sexual activity: Not Currently     Partners: Female     Birth control/protection: Condom           Objective   Physical Exam  Vitals and nursing note reviewed.   Constitutional:       Appearance: Normal appearance.   HENT:      Head: Normocephalic.      Right Ear: Tympanic membrane and ear canal normal.      Left Ear: Tympanic membrane and ear canal normal.      Nose: Nose normal.      Mouth/Throat:      Mouth: Mucous membranes are moist.      Pharynx: Oropharynx is clear.   Eyes:      Extraocular Movements: Extraocular movements intact.      Pupils: Pupils are equal, round, and reactive to light.   Cardiovascular:      Rate and Rhythm: Normal rate and regular rhythm.      Pulses: Normal pulses.      Heart sounds: Normal heart sounds.   Pulmonary:      Effort: Pulmonary effort is normal.      Breath sounds: Normal breath sounds.   Abdominal:      General: Abdomen is flat.      Palpations: Abdomen is soft.   Musculoskeletal:         General: Normal range of motion.      Cervical back: Normal range of motion and  neck supple.   Skin:     General: Skin is warm and dry.      Capillary Refill: Capillary refill takes less than 2 seconds.   Neurological:      General: No focal deficit present.      Mental Status: He is alert and oriented to person, place, and time.   Psychiatric:         Mood and Affect: Mood normal.         Behavior: Behavior normal.         Procedures           ED Course                                           Medical Decision Making  Patient's x-ray shows no evidence of pneumonia patient does have greenish sputum on exam patient be discharged home with prednisone for 5 days as well as doxycycline patient is prepping for colonoscopy which will start on Tuesday evening patient be discharged at this time recommend close return precautions noted some respiratory distress noted    Amount and/or Complexity of Data Reviewed  Radiology: ordered.          Final diagnoses:   Bronchitis   Cough, unspecified type       ED Disposition  ED Disposition     ED Disposition   Discharge    Condition   Stable    Comment   --             Jona Vieira MD  [address] [facility] [place] [address]    [place] [address] [phone]               Medication List      New Prescriptions    doxycycline 100 MG capsule  Commonly known as: MONODOX  Take 1 capsule by mouth 2 (Two) Times a Day for 5 days.     predniSONE 50 MG tablet  Commonly known as: DELTASONE  Take 1 tablet by mouth Daily for 5 days.        Changed    losartan 25 MG tablet  Commonly known as: COZAAR  TAKE 1 TABLET BY MOUTH DAILY  What changed: additional instructions           Where to Get Your Medications      These medications were sent to [id number], [place] - [address] [facility] AT [facility] & [place] - [phone]  - [phone] fx [address] [place] [address] [place] [address]    Phone: [phone]   · doxycycline 100 MG capsule  · predniSONE 50 MG tablet

## 2023-03-21 ENCOUNTER — TELEPHONE (OUTPATIENT)
Dept: FAMILY MEDICINE CLINIC | Facility: CLINIC | Age: 68
End: 2023-03-21
Payer: OTHER GOVERNMENT

## 2023-03-21 NOTE — TELEPHONE ENCOUNTER
"    Caller: Nehemias Strong Jr. \"Darrell\"    Relationship to patient: Self    Best call back number: [phone]    Patient is needing:     SCHEDULED PATIENT WITH SUSAN TRAN FOR A WELCOME TO MEDICARE VISIT    COULD NOT WAIT UNTIL AUGUST TO GET IN WITH DOCTOR ROSARIO.             "

## 2023-03-22 ENCOUNTER — ON CAMPUS - OUTPATIENT (AMBULATORY)
Dept: URBAN - METROPOLITAN AREA HOSPITAL 2 | Facility: HOSPITAL | Age: 68
End: 2023-03-22

## 2023-03-22 ENCOUNTER — OFFICE (AMBULATORY)
Dept: URBAN - METROPOLITAN AREA PATHOLOGY 4 | Facility: PATHOLOGY | Age: 68
End: 2023-03-22
Payer: COMMERCIAL

## 2023-03-22 ENCOUNTER — OFFICE (AMBULATORY)
Dept: URBAN - METROPOLITAN AREA PATHOLOGY 4 | Facility: PATHOLOGY | Age: 68
End: 2023-03-22

## 2023-03-22 VITALS
DIASTOLIC BLOOD PRESSURE: 75 MMHG | HEART RATE: 59 BPM | OXYGEN SATURATION: 96 % | RESPIRATION RATE: 16 BRPM | DIASTOLIC BLOOD PRESSURE: 77 MMHG | DIASTOLIC BLOOD PRESSURE: 69 MMHG | HEART RATE: 58 BPM | OXYGEN SATURATION: 98 % | SYSTOLIC BLOOD PRESSURE: 108 MMHG | RESPIRATION RATE: 12 BRPM | SYSTOLIC BLOOD PRESSURE: 106 MMHG | DIASTOLIC BLOOD PRESSURE: 84 MMHG | OXYGEN SATURATION: 97 % | HEART RATE: 54 BPM | SYSTOLIC BLOOD PRESSURE: 124 MMHG | HEART RATE: 53 BPM | OXYGEN SATURATION: 99 % | SYSTOLIC BLOOD PRESSURE: 139 MMHG | DIASTOLIC BLOOD PRESSURE: 78 MMHG | HEIGHT: 73 IN | DIASTOLIC BLOOD PRESSURE: 68 MMHG | HEART RATE: 56 BPM | TEMPERATURE: 98.6 F | OXYGEN SATURATION: 100 % | DIASTOLIC BLOOD PRESSURE: 80 MMHG | SYSTOLIC BLOOD PRESSURE: 102 MMHG | SYSTOLIC BLOOD PRESSURE: 118 MMHG | WEIGHT: 222 LBS

## 2023-03-22 DIAGNOSIS — D12.2 BENIGN NEOPLASM OF ASCENDING COLON: ICD-10-CM

## 2023-03-22 DIAGNOSIS — Z86.010 PERSONAL HISTORY OF COLONIC POLYPS: ICD-10-CM

## 2023-03-22 LAB
GI HISTOLOGY: A. UNSPECIFIED: (no result)
GI HISTOLOGY: PDF REPORT: (no result)

## 2023-03-22 PROCEDURE — 88305 TISSUE EXAM BY PATHOLOGIST: CPT | Mod: 26 | Performed by: INTERNAL MEDICINE

## 2023-03-22 PROCEDURE — 45385 COLONOSCOPY W/LESION REMOVAL: CPT | Mod: PT | Performed by: INTERNAL MEDICINE

## 2023-04-04 ENCOUNTER — LAB (OUTPATIENT)
Dept: FAMILY MEDICINE CLINIC | Facility: CLINIC | Age: 68
End: 2023-04-04
Payer: MEDICARE

## 2023-04-04 ENCOUNTER — OFFICE VISIT (OUTPATIENT)
Dept: FAMILY MEDICINE CLINIC | Facility: CLINIC | Age: 68
End: 2023-04-04
Payer: MEDICARE

## 2023-04-04 VITALS
RESPIRATION RATE: 18 BRPM | TEMPERATURE: 97.5 F | OXYGEN SATURATION: 99 % | HEIGHT: 73 IN | HEART RATE: 60 BPM | DIASTOLIC BLOOD PRESSURE: 71 MMHG | BODY MASS INDEX: 29.85 KG/M2 | WEIGHT: 225.2 LBS | SYSTOLIC BLOOD PRESSURE: 123 MMHG

## 2023-04-04 DIAGNOSIS — J30.9 ALLERGIC RHINITIS, UNSPECIFIED SEASONALITY, UNSPECIFIED TRIGGER: ICD-10-CM

## 2023-04-04 DIAGNOSIS — Z12.5 SCREENING PSA (PROSTATE SPECIFIC ANTIGEN): ICD-10-CM

## 2023-04-04 DIAGNOSIS — Z00.00 ANNUAL PHYSICAL EXAM: ICD-10-CM

## 2023-04-04 DIAGNOSIS — K21.9 GASTROESOPHAGEAL REFLUX DISEASE, UNSPECIFIED WHETHER ESOPHAGITIS PRESENT: ICD-10-CM

## 2023-04-04 DIAGNOSIS — E78.5 HYPERLIPIDEMIA, UNSPECIFIED HYPERLIPIDEMIA TYPE: ICD-10-CM

## 2023-04-04 DIAGNOSIS — I10 PRIMARY HYPERTENSION: ICD-10-CM

## 2023-04-04 DIAGNOSIS — Z00.00 ANNUAL PHYSICAL EXAM: Primary | ICD-10-CM

## 2023-04-04 DIAGNOSIS — E55.9 VITAMIN D DEFICIENCY: ICD-10-CM

## 2023-04-04 LAB
25(OH)D3 SERPL-MCNC: 31.5 NG/ML (ref 30–100)
ALBUMIN SERPL-MCNC: 4.3 G/DL (ref 3.5–5.2)
ALBUMIN/GLOB SERPL: 1.6 G/DL
ALP SERPL-CCNC: 79 U/L (ref 39–117)
ALT SERPL W P-5'-P-CCNC: 38 U/L (ref 1–41)
ANION GAP SERPL CALCULATED.3IONS-SCNC: 10 MMOL/L (ref 5–15)
AST SERPL-CCNC: 28 U/L (ref 1–40)
BASOPHILS # BLD AUTO: 0.04 10*3/MM3 (ref 0–0.2)
BASOPHILS NFR BLD AUTO: 0.8 % (ref 0–1.5)
BILIRUB SERPL-MCNC: 0.5 MG/DL (ref 0–1.2)
BUN SERPL-MCNC: 14 MG/DL (ref 8–23)
BUN/CREAT SERPL: 11.1 (ref 7–25)
CALCIUM SPEC-SCNC: 9.5 MG/DL (ref 8.6–10.5)
CHLORIDE SERPL-SCNC: 104 MMOL/L (ref 98–107)
CHOLEST SERPL-MCNC: 233 MG/DL (ref 0–200)
CO2 SERPL-SCNC: 27 MMOL/L (ref 22–29)
CREAT SERPL-MCNC: 1.26 MG/DL (ref 0.76–1.27)
DEPRECATED RDW RBC AUTO: 39.8 FL (ref 37–54)
EGFRCR SERPLBLD CKD-EPI 2021: 63.3 ML/MIN/1.73
EOSINOPHIL # BLD AUTO: 0.08 10*3/MM3 (ref 0–0.4)
EOSINOPHIL NFR BLD AUTO: 1.6 % (ref 0.3–6.2)
ERYTHROCYTE [DISTWIDTH] IN BLOOD BY AUTOMATED COUNT: 12.8 % (ref 12.3–15.4)
GLOBULIN UR ELPH-MCNC: 2.7 GM/DL
GLUCOSE SERPL-MCNC: 96 MG/DL (ref 65–99)
HCT VFR BLD AUTO: 47.3 % (ref 37.5–51)
HDLC SERPL-MCNC: 50 MG/DL (ref 40–60)
HGB BLD-MCNC: 16.1 G/DL (ref 13–17.7)
IMM GRANULOCYTES # BLD AUTO: 0.01 10*3/MM3 (ref 0–0.05)
IMM GRANULOCYTES NFR BLD AUTO: 0.2 % (ref 0–0.5)
LDLC SERPL CALC-MCNC: 155 MG/DL (ref 0–100)
LDLC/HDLC SERPL: 3.04 {RATIO}
LYMPHOCYTES # BLD AUTO: 1.85 10*3/MM3 (ref 0.7–3.1)
LYMPHOCYTES NFR BLD AUTO: 36.5 % (ref 19.6–45.3)
MCH RBC QN AUTO: 29.3 PG (ref 26.6–33)
MCHC RBC AUTO-ENTMCNC: 34 G/DL (ref 31.5–35.7)
MCV RBC AUTO: 86 FL (ref 79–97)
MONOCYTES # BLD AUTO: 0.36 10*3/MM3 (ref 0.1–0.9)
MONOCYTES NFR BLD AUTO: 7.1 % (ref 5–12)
NEUTROPHILS NFR BLD AUTO: 2.73 10*3/MM3 (ref 1.7–7)
NEUTROPHILS NFR BLD AUTO: 53.8 % (ref 42.7–76)
NRBC BLD AUTO-RTO: 0 /100 WBC (ref 0–0.2)
PLATELET # BLD AUTO: 224 10*3/MM3 (ref 140–450)
PMV BLD AUTO: 9.7 FL (ref 6–12)
POTASSIUM SERPL-SCNC: 4.8 MMOL/L (ref 3.5–5.2)
PROT SERPL-MCNC: 7 G/DL (ref 6–8.5)
PSA SERPL-MCNC: 2.15 NG/ML (ref 0–4)
RBC # BLD AUTO: 5.5 10*6/MM3 (ref 4.14–5.8)
SODIUM SERPL-SCNC: 141 MMOL/L (ref 136–145)
TRIGL SERPL-MCNC: 155 MG/DL (ref 0–150)
TSH SERPL DL<=0.05 MIU/L-ACNC: 1.03 UIU/ML (ref 0.27–4.2)
VLDLC SERPL-MCNC: 28 MG/DL (ref 5–40)
WBC NRBC COR # BLD: 5.07 10*3/MM3 (ref 3.4–10.8)

## 2023-04-04 PROCEDURE — 84443 ASSAY THYROID STIM HORMONE: CPT | Performed by: PHYSICIAN ASSISTANT

## 2023-04-04 PROCEDURE — G0103 PSA SCREENING: HCPCS | Performed by: PHYSICIAN ASSISTANT

## 2023-04-04 PROCEDURE — 80053 COMPREHEN METABOLIC PANEL: CPT | Performed by: PHYSICIAN ASSISTANT

## 2023-04-04 PROCEDURE — 80061 LIPID PANEL: CPT | Performed by: PHYSICIAN ASSISTANT

## 2023-04-04 PROCEDURE — 82306 VITAMIN D 25 HYDROXY: CPT | Performed by: PHYSICIAN ASSISTANT

## 2023-04-04 PROCEDURE — 85025 COMPLETE CBC W/AUTO DIFF WBC: CPT | Performed by: PHYSICIAN ASSISTANT

## 2023-04-04 PROCEDURE — 36415 COLL VENOUS BLD VENIPUNCTURE: CPT

## 2023-04-04 RX ORDER — PANTOPRAZOLE SODIUM 20 MG/1
20 TABLET, DELAYED RELEASE ORAL EVERY MORNING
COMMUNITY
Start: 2023-02-27

## 2023-04-04 RX ORDER — AZELASTINE 1 MG/ML
SPRAY, METERED NASAL
COMMUNITY
Start: 2023-01-25

## 2023-04-04 NOTE — PROGRESS NOTES
The ABCs of the Annual Wellness Visit  Subsequent Medicare Wellness Visit    Subjective    Nehemias Strong Jr. is a 67 y.o. male who presents for a Subsequent Medicare Wellness Visit.    The following portions of the patient's history were reviewed and   updated as appropriate: allergies, current medications, past family history, past medical history, past social history, past surgical history and problem list.    Compared to one year ago, the patient feels his physical   health is worse.    Compared to one year ago, the patient feels his mental   health is the same.    Recent Hospitalizations:  This patient has had a [facility] admission record on file within the last 365 days.    Current Medical Providers:  Patient Care Team:  Jona Vieira MD as PCP - General (Internal Medicine)  Alejandro Smith MD as Consulting Physician (Cardiac Electrophysiology)  Luis Carlos Travis MD as Consulting Physician (Urology)    Outpatient Medications Prior to Visit   Medication Sig Dispense Refill   • aspirin 81 MG chewable tablet Chew 1 tablet Daily. Stopped already  LAST DOSE 9-21-22     • fluticasone (FLONASE) 50 MCG/ACT nasal spray 2 sprays by Each Nare route Daily. Shake well before using.     • montelukast (SINGULAIR) 10 MG tablet TAKE 1 TABLET BY MOUTH EVERY NIGHT 90 tablet 1   • Multiple Vitamin (MULTIVITAMIN) capsule Take 1 capsule by mouth Daily. Stop 9-23-22     • Sodium Chloride-Sodium Bicarb (AYR SALINE NASAL RINSE NA) into the nostril(s) as directed by provider.     • famotidine (PEPCID) 40 MG tablet Take 1 tablet by mouth Daily.     • losartan (COZAAR) 25 MG tablet TAKE 1 TABLET BY MOUTH DAILY 90 tablet 1   • azelastine (ASTELIN) 0.1 % nasal spray USE 2 SPRAYS IN EACH NOSTRIL IN THE EVENING AS NEEDED     • pantoprazole (PROTONIX) 20 MG EC tablet Take 1 tablet by mouth Every Morning.     • benzonatate (TESSALON) 200 MG capsule Take 1 capsule by mouth 3 (Three) Times a Day As Needed for Cough. 30  "capsule 0   • guaifenesin-dextromethorphan (MUCINEX DM)  MG tablet sustained-release 12 hour tablet Take 1-2 tablets by mouth 2 (Two) Times a Day As Needed (COUGH). 20 tablet 0     No facility-administered medications prior to visit.       No opioid medication identified on active medication list. I have reviewed chart for other potential  high risk medication/s and harmful drug interactions in the elderly.          Aspirin is on active medication list. Aspirin use is indicated based on review of current medical condition/s. Pros and cons of this therapy have been discussed today. Benefits of this medication outweigh potential harm.  Patient has been encouraged to continue taking this medication.  .      Patient Active Problem List   Diagnosis   • Vitamin D deficiency   • Migraine headache   • Kidney stones   • Idiopathic cyst of iris, ciliary body, or anterior chamber   • Hyperlipidemia   • Gastroesophageal reflux disease   • Enlarged prostate   • Bradycardia   • Cardiac arrhythmia   • Allergic rhinitis   • Benign neoplasm of skin of eyelid   • Family history of lung cancer   • Odynophagia   • Plantar fasciitis   • Rectal prolapse   • Annual physical exam   • Thyroid nodule   • Primary hypertension   • Lipoma   • Obstructive lung disease (generalized)   • Clinical diagnosis of severe acute respiratory syndrome coronavirus 2 (SARS-CoV-2) disease     Advance Care Planning   Advance Care Planning     Advance Directive is not on file.  ACP discussion was held with the patient during this visit. Patient has an advance directive (not in EMR), copy requested.     Objective    Vitals:    04/04/23 1022   BP: 123/71   Pulse: 60   Resp: 18   Temp: 97.5 °F (36.4 °C)   TempSrc: Infrared   SpO2: 99%   Weight: 102 kg (225 lb 3.2 oz)   Height: 185.4 cm (72.99\")     Estimated body mass index is 29.72 kg/m² as calculated from the following:    Height as of this encounter: 185.4 cm (72.99\").    Weight as of this encounter: 102 " kg (225 lb 3.2 oz).    BMI is >= 25 and <30. (Overweight) The following options were offered after discussion;: exercise counseling/recommendations and nutrition counseling/recommendations      Does the patient have evidence of cognitive impairment? No    Lab Results   Component Value Date    TRIG 155 (H) 2023    HDL 50 2023     (H) 2023    VLDL 28 2023        HEALTH RISK ASSESSMENT    Smoking Status:  Social History     Tobacco Use   Smoking Status Never   • Passive exposure: Never   Smokeless Tobacco Never     Alcohol Consumption:  Social History     Substance and Sexual Activity   Alcohol Use No     Fall Risk Screen:    STEADI Fall Risk Assessment was completed, and patient is at LOW risk for falls.Assessment completed on:2023    Depression Screenin/4/2023    10:24 AM   PHQ-2/PHQ-9 Depression Screening   Little Interest or Pleasure in Doing Things 0-->not at all   Feeling Down, Depressed or Hopeless 0-->not at all   PHQ-9: Brief Depression Severity Measure Score 0       Health Habits and Functional and Cognitive Screenin/4/2023    10:24 AM   Functional & Cognitive Status   Do you have difficulty preparing food and eating? No   Do you have difficulty bathing yourself, getting dressed or grooming yourself? No   Do you have difficulty using the toilet? No   Do you have difficulty moving around from place to place? No   Do you have trouble with steps or getting out of a bed or a chair? No   Current Diet Limited Junk Food   Dental Exam Up to date   Eye Exam Up to date   Exercise (times per week) 4 times per week   Current Exercises Include Walking;Aerobics;Yard Work;House Cleaning;Cardiovascular Workout;Stationary Bicycling/Spin Class   Do you need help using the phone?  No   Are you deaf or do you have serious difficulty hearing?  No   Do you need help with transportation? No   Do you need help shopping? No   Do you need help preparing meals?  No   Do you need help  with housework?  No   Do you need help with laundry? No   Do you need help taking your medications? No   Do you need help managing money? No   Do you ever drive or ride in a car without wearing a seat belt? No       Age-appropriate Screening Schedule:  Refer to the list below for future screening recommendations based on patient's age, sex and/or medical conditions. Orders for these recommended tests are listed in the plan section. The patient has been provided with a written plan.    Health Maintenance   Topic Date Due   • Pneumococcal Vaccine 65+ (1 - PCV) Never done   • INFLUENZA VACCINE  08/01/2023   • ANNUAL WELLNESS VISIT  04/04/2024   • LIPID PANEL  04/04/2024   • TDAP/TD VACCINES (2 - Td or Tdap) 09/07/2026   • COLORECTAL CANCER SCREENING  03/22/2028   • HEPATITIS C SCREENING  Completed   • ZOSTER VACCINE  Completed   • COVID-19 Vaccine  Discontinued                  CMS Preventative Services Quick Reference  Risk Factors Identified During Encounter  Immunizations Discussed/Encouraged: Prevnar 20 (Pneumococcal 20-valent conjugate)  The above risks/problems have been discussed with the patient.  Pertinent information has been shared with the patient in the After Visit Summary.  An After Visit Summary and PPPS were made available to the patient.    Follow Up:   Next Medicare Wellness visit to be scheduled in 1 year.         Additional E&M Note during same encounter follows:  Patient has multiple medical problems which are significant and separately identifiable that require additional work above and beyond the Medicare Wellness Visit.      Chief Complaint  Medicare Wellness-subsequent    Subjective        HPI  Nehemias Pinedamanoj Hoff. is also being seen today for htn, allergies, and gerd. Doing well on current medications. No worsening reflux. Blood pressure stable on losartan 25mg. No chest pain or soa. No headaches abdominal pain.          Objective   Vital Signs:  /71   Pulse 60   Temp 97.5 °F (36.4  "°C) (Infrared)   Resp 18   Ht 185.4 cm (72.99\")   Wt 102 kg (225 lb 3.2 oz)   SpO2 99%   BMI 29.72 kg/m²     Physical Exam  Constitutional:       Appearance: He is well-developed.   HENT:      Head: Normocephalic and atraumatic.      Right Ear: Tympanic membrane normal.      Left Ear: Tympanic membrane normal.      Nose: No congestion.      Mouth/Throat:      Pharynx: No oropharyngeal exudate.   Eyes:      Conjunctiva/sclera: Conjunctivae normal.      Pupils: Pupils are equal, round, and reactive to light.   Cardiovascular:      Rate and Rhythm: Normal rate and regular rhythm.      Heart sounds: No murmur heard.  Pulmonary:      Effort: Pulmonary effort is normal.      Breath sounds: Normal breath sounds.   Abdominal:      General: Bowel sounds are normal.      Palpations: Abdomen is soft.      Tenderness: There is no abdominal tenderness.   Musculoskeletal:         General: No deformity. Normal range of motion.      Cervical back: Normal range of motion and neck supple.   Lymphadenopathy:      Cervical: No cervical adenopathy.   Skin:     General: Skin is warm and dry.      Findings: No rash.   Neurological:      Mental Status: He is alert and oriented to person, place, and time.      Cranial Nerves: No cranial nerve deficit.   Psychiatric:         Behavior: Behavior normal.         Thought Content: Thought content normal.         Judgment: Judgment normal.          The following data was reviewed by: Jovany Cheema PA-C on 04/04/2023:  Common labs        9/26/2022    07:44 4/4/2023    11:35   Common Labs   Glucose 98   96     BUN 14   14     Creatinine 1.26   1.26     Sodium 140   141     Potassium 4.0   4.8     Chloride 104   104     Calcium 9.1   9.5     Albumin  4.3     Total Bilirubin  0.5     Alkaline Phosphatase  79     AST (SGOT)  28     ALT (SGPT)  38     WBC 6.91   5.07     Hemoglobin 15.7   16.1     Hematocrit 45.4   47.3     Platelets 195   224     Total Cholesterol  233     Triglycerides  155   "   HDL Cholesterol  50     LDL Cholesterol   155     PSA  2.150                  Assessment and Plan     Diagnoses and all orders for this visit:    1. Annual physical exam (Primary)  -     Comprehensive metabolic panel; Future  -     CBC w AUTO Differential; Future  -     Lipid panel; Future  -     TSH Rfx On Abnormal To Free T4; Future  -     PSA SCREENING; Future  -     Vitamin D 25 hydroxy; Future    2. Primary hypertension    3. Gastroesophageal reflux disease, unspecified whether esophagitis present    4. Allergic rhinitis, unspecified seasonality, unspecified trigger    5. Hyperlipidemia, unspecified hyperlipidemia type    6. Screening PSA (prostate specific antigen)  -     PSA SCREENING; Future    7. Vitamin D deficiency  -     Vitamin D 25 hydroxy; Future    Healthy eating habits. Low saturated fats. High plant based. Avoid processed foods. 15-30 min of cardiovascular exercise 5 days per week with atleast 2-3 days of resistance training.          I spent 45 minutes caring for Nehemias on this date of service. This time includes time spent by me in the following activities:preparing for the visit, reviewing tests, obtaining and/or reviewing a separately obtained history, performing a medically appropriate examination and/or evaluation , counseling and educating the patient/family/caregiver, ordering medications, tests, or procedures and documenting information in the medical record  Follow Up     No follow-ups on file.  Patient was given instructions and counseling regarding his condition or for health maintenance advice. Please see specific information pulled into the AVS if appropriate.

## 2023-04-06 DIAGNOSIS — E78.2 MIXED HYPERLIPIDEMIA: Primary | ICD-10-CM

## 2023-04-20 DIAGNOSIS — I10 PRIMARY HYPERTENSION: ICD-10-CM

## 2023-04-20 RX ORDER — LOSARTAN POTASSIUM 25 MG/1
25 TABLET ORAL DAILY
Qty: 90 TABLET | Refills: 1 | Status: SHIPPED | OUTPATIENT
Start: 2023-04-20

## 2023-05-11 ENCOUNTER — TELEPHONE (OUTPATIENT)
Dept: FAMILY MEDICINE CLINIC | Facility: CLINIC | Age: 68
End: 2023-05-11
Payer: OTHER GOVERNMENT

## 2023-05-11 NOTE — TELEPHONE ENCOUNTER
Dr. Vieira, the last time you saw this patient for blood pressure was April 2022, he saw Jovany for his MCW last month. Is this letter something you are comfortable writing or do you want to see him?

## 2023-05-11 NOTE — TELEPHONE ENCOUNTER
"Caller: Nehemias Strong Jr. \"Darrell\"    Relationship: Self    Best call back number: [phone]    What form or medical record are you requesting: MEDICATION UPDATE FORM FOR WORK     How would you like to receive the form or medical records (pick-up, mail, fax):      Timeframe paperwork needed: NEEDS TO  TOMORROW. PLEASE CALL WHEN READY.     Additional notes: PT NEEDS LETTER STATING THAT HE HAS BEEN ON NEW MEDICATION FOR OVER 3 MONTHS WITH NO SIDE EFFECTS. MUST LIST THE FOLLOWING CHANGES: LISINOPRIL CHANGED TO losartan (COZAAR) 25 MG tablet AND FAMOTIDINE CHANGED TO pantoprazole (PROTONIX) 20 MG EC tablet    "

## 2023-05-11 NOTE — TELEPHONE ENCOUNTER
ELLIOT YOU SAW PATIENT ON 4/4/23 FOR MEDICARE WELLNESS. IF YOU MADE THESE MED CHANGES WOULD YOU BE ABLE TO WRITE THIS FOR HIM?

## 2023-05-12 NOTE — TELEPHONE ENCOUNTER
HUB TO SHARE: SENT FOLLOWING [employer] MESSAGE. Dr Dariel Ramírez said he would need to see you for an appointment before writing a letter since he did not make those medication changes for you. Manuelito Cheema was the last provider you saw here and he is off today so could not write the letter within the timeframe. He did notice that you are scheduled to establish with a new provider Dr Sweet on 5/15/23 and recommended you have him write the letter.

## 2023-05-12 NOTE — TELEPHONE ENCOUNTER
PATIENT CALLED BACK AND SAID THAT THE APPOINTMENT ON 5/15/23 WITH DR LAIRD IS NOT HIM CHANGING PCP THAT HIS THE [facility] MEDICAL EXAMINER WHO IS WANTING THIS MEDICATION LETTER. HIS APPT IS AT 1PM ON MON AND HE WOULD LIKE TO STOP BY HERE ON HIS WAY AND  THE LETTER.

## 2023-05-15 ENCOUNTER — OFFICE VISIT (OUTPATIENT)
Dept: FAMILY MEDICINE CLINIC | Facility: CLINIC | Age: 68
End: 2023-05-15

## 2023-05-15 VITALS
HEIGHT: 73 IN | WEIGHT: 215.6 LBS | TEMPERATURE: 97.7 F | OXYGEN SATURATION: 97 % | HEART RATE: 70 BPM | BODY MASS INDEX: 28.57 KG/M2 | RESPIRATION RATE: 14 BRPM | SYSTOLIC BLOOD PRESSURE: 149 MMHG | DIASTOLIC BLOOD PRESSURE: 75 MMHG

## 2023-05-15 DIAGNOSIS — Z02.89 ENCOUNTER FOR FEDERAL AVIATION ADMINISTRATION (FAA) EXAMINATION: Primary | ICD-10-CM

## 2023-05-15 DIAGNOSIS — I10 PRIMARY HYPERTENSION: ICD-10-CM

## 2023-05-15 NOTE — PROGRESS NOTES
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  Have you had these symptoms before?: No  How long have you been having these symptoms?: 1-4 days  Please list any medications you are currently taking for this condition.: none  Please describe any probable cause for these symptoms. : none      Nehemias Strong Jr. is a 67 y.o. male.     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  He is going to fly for [employer]       The following portions of the patient's history were reviewed and updated as appropriate: current medications, past family history, past medical history, past social history, past surgical history and problem list.    Family History   Problem Relation Age of Onset   • Lung cancer Mother         large cell   • Cancer Mother         Large cell melanoma from lung cancer, smoking related. , 3/26/94   • No Known Problems Father    • Birth defects Brother         hole between left and right ventricles at birth.  at age 6 in , 3 days after experimental surgery.   • No Known Problems Maternal Grandmother    • No Known Problems Maternal Grandfather    • No Known Problems Paternal Grandmother    • No Known Problems Paternal Grandfather    • No Known Problems Sister        Social History     Tobacco Use   • Smoking status: Never     Passive exposure: Never   • Smokeless tobacco: Never   Vaping Use   • Vaping Use: Never used   Substance Use Topics   • Alcohol use: No   • Drug use: Never       Past Surgical History:   Procedure Laterality Date   • COLONOSCOPY     • DENTAL PROCEDURE     • FINGER SURGERY  2022    cyst removal   • HERNIA REPAIR Right     inguinal w/ Lilia   • MASS EXCISION N/A 2021    Procedure: lipoma excisions to abdomen, bilateral upper lateral thighs and bialteral arms;  Surgeon:  Williams Stafford DO;  Location: [facility] MAIN OR;  Service: General;  Laterality: N/A;   • UMBILICAL HERNIA REPAIR N/A 9/30/2022    Procedure: UMBILICAL HERNIA REPAIR with mesh;  Surgeon: Robe Torres MD;  Location: [facility] MAIN OR;  Service: General;  Laterality: N/A;   • US GUIDED FINE NEEDLE ASPIRATION      Dr PRATHER       Patient Active Problem List   Diagnosis   • Vitamin D deficiency   • Migraine headache   • Kidney stones   • Idiopathic cyst of iris, ciliary body, or anterior chamber   • Hyperlipidemia   • Gastroesophageal reflux disease   • Enlarged prostate   • Bradycardia   • Cardiac arrhythmia   • Allergic rhinitis   • Benign neoplasm of skin of eyelid   • Family history of lung cancer   • Odynophagia   • Plantar fasciitis   • Rectal prolapse   • Annual physical exam   • Thyroid nodule   • Primary hypertension   • Lipoma   • Obstructive lung disease (generalized)   • Clinical diagnosis of severe acute respiratory syndrome coronavirus 2 (SARS-CoV-2) disease   • Encounter for Federal Aviation Administration (FAA) examination       Current Outpatient Medications on File Prior to Visit   Medication Sig Dispense Refill   • aspirin 81 MG chewable tablet Chew 1 tablet Daily. Stopped already  LAST DOSE 9-21-22     • azelastine (ASTELIN) 0.1 % nasal spray USE 2 SPRAYS IN EACH NOSTRIL IN THE EVENING AS NEEDED     • fluticasone (FLONASE) 50 MCG/ACT nasal spray 2 sprays by Each Nare route Daily. Shake well before using.     • losartan (COZAAR) 25 MG tablet TAKE 1 TABLET BY MOUTH DAILY 90 tablet 1   • montelukast (SINGULAIR) 10 MG tablet TAKE 1 TABLET BY MOUTH EVERY NIGHT 90 tablet 1   • Multiple Vitamin (MULTIVITAMIN) capsule Take 1 capsule by mouth Daily. Stop 9-23-22     • pantoprazole (PROTONIX) 20 MG EC tablet Take 1 tablet by mouth Every Morning.     • Sodium Chloride-Sodium Bicarb (AYR SALINE NASAL RINSE NA) into the nostril(s) as directed by provider.       No current facility-administered medications on  "file prior to visit.       No Known Allergies    Review of Systems    Objective   Visit Vitals  /75 (BP Location: Left arm, Patient Position: Sitting, Cuff Size: Adult)   Pulse 70   Temp 97.7 °F (36.5 °C)   Resp 14   Ht 185.4 cm (73\")   Wt 97.8 kg (215 lb 9.6 oz)   SpO2 97%   BMI 28.44 kg/m²     Physical Exam  Vitals and nursing note reviewed.   Constitutional:       General: He is not in acute distress.     Appearance: He is well-developed. He is not diaphoretic.   HENT:      Head: Normocephalic and atraumatic.      Right Ear: External ear normal.      Left Ear: External ear normal.      Mouth/Throat:      Pharynx: No oropharyngeal exudate.   Eyes:      General: Lids are normal. Lids are everted, no foreign bodies appreciated.         Right eye: No discharge.         Left eye: No discharge.      Conjunctiva/sclera: Conjunctivae normal.      Right eye: Right conjunctiva is not injected. No exudate.     Left eye: Left conjunctiva is not injected. No exudate.     Pupils: Pupils are equal, round, and reactive to light.      Funduscopic exam:     Right eye: No hemorrhage, exudate, AV nicking or papilledema.         Left eye: No hemorrhage, exudate, AV nicking or papilledema.      Comments: Visual field normal to 90 degrees bilat   Neck:      Thyroid: No thyromegaly.   Cardiovascular:      Rate and Rhythm: Normal rate and regular rhythm.      Heart sounds: Normal heart sounds. No murmur heard.    No friction rub. No gallop.   Pulmonary:      Effort: Pulmonary effort is normal. No respiratory distress.      Breath sounds: Normal breath sounds. No wheezing.   Chest:      Chest wall: No tenderness.   Abdominal:      General: Bowel sounds are normal. There is no distension.      Palpations: Abdomen is soft.      Tenderness: There is no abdominal tenderness. There is no guarding or rebound.      Hernia: No hernia is present.   Genitourinary:     Comments: Penis normal  testicle descended bilataral without " abnorm  Musculoskeletal:         General: No tenderness or deformity. Normal range of motion.      Cervical back: Normal range of motion and neck supple.   Lymphadenopathy:      Cervical: No cervical adenopathy.      Right cervical: No superficial cervical adenopathy.     Left cervical: No superficial cervical adenopathy.   Skin:     General: Skin is warm and dry.      Findings: No erythema or rash.   Neurological:      Mental Status: He is alert and oriented to person, place, and time.      Cranial Nerves: No cranial nerve deficit.      Sensory: No sensory deficit.      Motor: No abnormal muscle tone.      Coordination: Coordination normal.      Gait: Gait normal.      Deep Tendon Reflexes: Reflexes normal.   Psychiatric:         Behavior: Behavior normal.         Thought Content: Thought content normal.         Judgment: Judgment normal.           Assessment & Plan .  Problem List Items Addressed This Visit        Unprioritized    Encounter for Federal Aviation Administration (FAA) examination - Primary    Current Assessment & Plan     Patient passed the 2nd class flight examination.  Ua done today was normal. Vision 20/20 corrected-bilaterally. Forms electronically filed to the FAA           Relevant Orders    POCT urinalysis dipstick, manual (Completed)    Primary hypertension    Current Assessment & Plan     CACI qualified on losartan

## 2023-05-15 NOTE — ASSESSMENT & PLAN NOTE
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  Ua done today was normal. Vision 20/20 corrected-bilaterally. Forms electronically filed to the FAA

## 2023-05-16 LAB
BILIRUB BLD-MCNC: NEGATIVE MG/DL
CLARITY, POC: CLEAR
COLOR UR: YELLOW
GLUCOSE UR STRIP-MCNC: NEGATIVE MG/DL
KETONES UR QL: NEGATIVE
LEUKOCYTE EST, POC: NEGATIVE
NITRITE UR-MCNC: NEGATIVE MG/ML
PH UR: 6 [PH] (ref 5–8)
PROT UR STRIP-MCNC: NEGATIVE MG/DL
RBC # UR STRIP: NEGATIVE /UL
SP GR UR: 1.01 (ref 1–1.03)
UROBILINOGEN UR QL: NORMAL

## 2023-05-31 ENCOUNTER — TELEPHONE (OUTPATIENT)
Dept: FAMILY MEDICINE CLINIC | Facility: CLINIC | Age: 68
End: 2023-05-31
Payer: OTHER GOVERNMENT

## 2023-05-31 NOTE — TELEPHONE ENCOUNTER
"Caller: Nehemias Strong Jr. 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    HE SPOKE TO [facility] BILLING DEPARTMENT AND THEY HAVE NO IDEA WHY HE RECEIVED THIS BILL OR WHY THE APPOINTMENT WAS MARKED AS SELF PAY- IF IT WAS.     HE STATES HE PAID 108$ SELF PAY ON 05/15/23 IN OFFICE- AND HAS A RECEIPT FOR THAT. HE ALSO RECEIVED A BILL FOR 49.15- FOR A URINALYSIS/DIPSTICK/ AUTO-MICROSCOPIC.      PATIENT IS ASKING FOR SOMEONE IN OFFICE TO PLEASE GIVE HIM A CALLBACK REGARDING THIS BILL HE RECEIVED.          "

## 2023-06-19 NOTE — TELEPHONE ENCOUNTER
Notified patient that we have written off the balance and apologies for our mistake. Made him aware of the price increase for next time.

## 2023-07-06 PROBLEM — K62.89 OTHER SPECIFIED DISEASES OF ANUS AND RECTUM: Status: ACTIVE | Noted: 2017-03-09

## 2023-07-27 ENCOUNTER — HOSPITAL ENCOUNTER (OUTPATIENT)
Dept: ULTRASOUND IMAGING | Facility: HOSPITAL | Age: 68
Discharge: HOME | End: 2023-07-27
Admitting: NURSE PRACTITIONER
Payer: MEDICARE

## 2023-07-27 DIAGNOSIS — K76.89 LIVER CYST: ICD-10-CM

## 2023-07-27 PROCEDURE — 76705 ECHO EXAM OF ABDOMEN: CPT

## 2023-08-15 ENCOUNTER — TELEPHONE (OUTPATIENT)
Dept: FAMILY MEDICINE CLINIC | Facility: CLINIC | Age: 68
End: 2023-08-15
Payer: OTHER GOVERNMENT

## 2023-09-06 ENCOUNTER — OFFICE VISIT (OUTPATIENT)
Dept: FAMILY MEDICINE CLINIC | Facility: CLINIC | Age: 68
End: 2023-09-06
Payer: MEDICARE

## 2023-09-06 VITALS
BODY MASS INDEX: 28.18 KG/M2 | HEIGHT: 73 IN | OXYGEN SATURATION: 99 % | RESPIRATION RATE: 16 BRPM | TEMPERATURE: 97.8 F | WEIGHT: 212.6 LBS | SYSTOLIC BLOOD PRESSURE: 112 MMHG | HEART RATE: 45 BPM | DIASTOLIC BLOOD PRESSURE: 69 MMHG

## 2023-09-06 DIAGNOSIS — J44.9 OBSTRUCTIVE LUNG DISEASE (GENERALIZED): Primary | ICD-10-CM

## 2023-09-06 DIAGNOSIS — J40 BRONCHITIS: ICD-10-CM

## 2023-09-06 PROCEDURE — 1160F RVW MEDS BY RX/DR IN RCRD: CPT | Performed by: FAMILY MEDICINE

## 2023-09-06 PROCEDURE — 3078F DIAST BP <80 MM HG: CPT | Performed by: FAMILY MEDICINE

## 2023-09-06 PROCEDURE — 1159F MED LIST DOCD IN RCRD: CPT | Performed by: FAMILY MEDICINE

## 2023-09-06 PROCEDURE — 3074F SYST BP LT 130 MM HG: CPT | Performed by: FAMILY MEDICINE

## 2023-09-06 PROCEDURE — 99214 OFFICE O/P EST MOD 30 MIN: CPT | Performed by: FAMILY MEDICINE

## 2023-09-06 RX ORDER — DOXYCYCLINE HYCLATE 100 MG/1
100 CAPSULE ORAL 2 TIMES DAILY
Qty: 20 CAPSULE | Refills: 0 | Status: SHIPPED | OUTPATIENT
Start: 2023-09-06 | End: 2023-09-16

## 2023-09-06 RX ORDER — ATORVASTATIN CALCIUM 20 MG/1
20 TABLET, FILM COATED ORAL DAILY
COMMUNITY

## 2023-09-06 NOTE — PROGRESS NOTES
Chief Complaint   Patient presents with    Follow-up     Follow up on heart , and throat .      HPI  Nehemias Strong Jr. is a 67 y.o. male that presents for   Chief Complaint   Patient presents with    Follow-up     Follow up on heart , and throat .      COPD: small airway obstructive lung disease seen on 10/2021 PFTs. Last seen 2 months ago and instructed to get back on Advair BID. He reports wheezing has improved but cough unchanged and more hoarse than he was 2 months ago. Cough continues to be productive. Reports mild SOB that is stable. He was given azithromycin and prednisone at last visit that helped while he was on it but symptoms quickly returned. 7/2023 CT chest revealed scarring to the LLL but otherwise clear. He has seen ENT but didn't really offer him much. He has seemed to have waxing/waning symptoms for over 1 year now.     Elevated calcium score: 7/2023 calcium score 587 w/ highest burden in LAD. He was referred to [facility] who ordered nuclear stress. This was unremarkable (reviewed today). He is now maintained on ASA and atorvastatin    Review of Systems   Constitutional:  Negative for chills and fever.   HENT:  Positive for voice change.    Respiratory:  Positive for cough, shortness of breath and wheezing.    Cardiovascular:  Negative for chest pain and palpitations.     The following portions of the patient's history were reviewed and updated as appropriate: problem list, past medical history, past surgical history, allergies, current medication    Problem List Tab  Patient History Tab  Immunizations Tab  Medications Tab  Chart Review Tab  Care Everywhere Tab  Synopsis Tab    PE  Vitals:    09/06/23 0914   BP: 112/69   Pulse: (!) 45   Resp: 16   Temp: 97.8 °F (36.6 °C)   SpO2: 99%     Body mass index is 28.06 kg/m².  General: Well nourished, NAD  Head: AT/NC  Eyes: EOMI, anicteric sclera  ENT: MMM w/o erythema  Neck: Supple, no thyromegaly or LAD  Resp: CTAB, SCR, BS equal  CV: RRR w/o m/r/g; 2+  pulses  GI: Soft, NT/ND, +BS  MSK: FROM, no deformity, no edema  Skin: Warm, dry, intact  Neuro: Alert and oriented. No focal deficits  Psych: Appropriate mood and affect    Imaging  XR Chest 2 View    Result Date: 6/28/2023  Impression: No acute cardiopulmonary findings. Electronically Signed: Elenita Garayamber  6/28/2023 9:49 AM EDT  Workstation ID: [id number]    CT Chest Without Contrast Diagnostic    Result Date: 7/17/2023  1. No acute pulmonary abnormality 2. Calcific coronary atherosclerosis Electronically Signed: Edwin Hoffman  7/17/2023 4:30 PM EDT  Workstation ID: [id number]    US Liver    Result Date: 7/28/2023  Impression: Small simple hepatic cysts which requires no follow-up. Electronically Signed: Osman Boggs  7/28/2023 12:19 PM EDT  Workstation ID: [id number]    CT Cardiac Calcium Score Without Dye    Result Date: 7/21/2023  Impression: 1. Coronary calcium score 587.4, extensive plaque burden highest within the LAD. 2. Mild cardiomegaly. Calcium Score Interpretation 0 -- Negative examination, no identifiable atherosclerotic plaque.  Very low risk of cardiac events in the next 5 years. 1-10 -- Minimal plaque burden.  Low risk of cardiac events in the next 5 years. 11- 100 -- Mild Plaque burden.  Mild risk of cardiac events in the next 5 years. 101 - 400 -- Moderate plaque burden.  Moderate risk of cardiac events in the next 5 years. Over 400 -- Extensive plaque burden.  High risk of cardiac events in the next 5 years. Electronically Signed: Yonathan Campbell MD  7/21/2023 9:16 AM EDT  Workstation ID: [id number]      Assessment & Plan   Nehemias Strong Jr. is a 67 y.o. male that presents for   Chief Complaint   Patient presents with    Follow-up     Follow up on heart , and throat .      Diagnoses and all orders for this visit:    1. Obstructive lung disease (generalized) (Primary): noted on previous PFTs. No smoking hx. patient has had symptoms of hoarseness, cough, wheezing intermittently for over a year.   Patient reports that ENT told him that symptoms should resolve with time but has been ongoing for better than a year.  CT reviewed and rather unremarkable.  Exam today unremarkable with clear lung fields.  Despite this, would appreciate pulmonology input given persistent productive cough and chronicity.  Potentially consider something like MAC.  TB considered given travel but CT clear.  -     Start doxycycline (VIBRAMYCIN) 100 MG capsule; Take 1 capsule by mouth 2 (Two) Times a Day for 10 days.  Dispense: 20 capsule; Refill: 0  - Continue home Advair twice daily and Singulair 10 daily  -     Ambulatory Referral to Pulmonology  - Recommend ENT follow-up    2. Bronchitis: vs larygnitis but w/ productive cough.  -     Start doxycycline (VIBRAMYCIN) 100 MG capsule; Take 1 capsule by mouth 2 (Two) Times a Day for 10 days.  Dispense: 20 capsule; Refill: 0  -     Ambulatory Referral to Pulmonology     Return in about 10 weeks (around 11/15/2023) for Recheck- 30min- bronchitis/laryngitis.

## 2023-09-19 ENCOUNTER — LAB (OUTPATIENT)
Dept: LAB | Facility: HOSPITAL | Age: 68
End: 2023-09-19
Payer: MEDICARE

## 2023-09-19 LAB
APTT PPP: 25.3 SECONDS (ref 24–31)
BASOPHILS # BLD AUTO: 0.03 10*3/MM3 (ref 0–0.2)
BASOPHILS NFR BLD AUTO: 0.5 % (ref 0–1.5)
DEPRECATED RDW RBC AUTO: 41 FL (ref 37–54)
EOSINOPHIL # BLD AUTO: 0.05 10*3/MM3 (ref 0–0.4)
EOSINOPHIL NFR BLD AUTO: 0.8 % (ref 0.3–6.2)
ERYTHROCYTE [DISTWIDTH] IN BLOOD BY AUTOMATED COUNT: 12.8 % (ref 12.3–15.4)
HCT VFR BLD AUTO: 44.1 % (ref 37.5–51)
HGB BLD-MCNC: 14.9 G/DL (ref 13–17.7)
IMM GRANULOCYTES # BLD AUTO: 0.02 10*3/MM3 (ref 0–0.05)
IMM GRANULOCYTES NFR BLD AUTO: 0.3 % (ref 0–0.5)
INR PPP: 0.96 (ref 0.93–1.1)
LYMPHOCYTES # BLD AUTO: 1.75 10*3/MM3 (ref 0.7–3.1)
LYMPHOCYTES NFR BLD AUTO: 29.2 % (ref 19.6–45.3)
MCH RBC QN AUTO: 29.9 PG (ref 26.6–33)
MCHC RBC AUTO-ENTMCNC: 33.8 G/DL (ref 31.5–35.7)
MCV RBC AUTO: 88.4 FL (ref 79–97)
MONOCYTES # BLD AUTO: 0.37 10*3/MM3 (ref 0.1–0.9)
MONOCYTES NFR BLD AUTO: 6.2 % (ref 5–12)
NEUTROPHILS NFR BLD AUTO: 3.77 10*3/MM3 (ref 1.7–7)
NEUTROPHILS NFR BLD AUTO: 63 % (ref 42.7–76)
NRBC BLD AUTO-RTO: 0 /100 WBC (ref 0–0.2)
PLATELET # BLD AUTO: 217 10*3/MM3 (ref 140–450)
PMV BLD AUTO: 9.5 FL (ref 6–12)
PROTHROMBIN TIME: 10.3 SECONDS (ref 9.6–11.7)
RBC # BLD AUTO: 4.99 10*6/MM3 (ref 4.14–5.8)
WBC NRBC COR # BLD: 5.99 10*3/MM3 (ref 3.4–10.8)

## 2023-09-19 PROCEDURE — 85730 THROMBOPLASTIN TIME PARTIAL: CPT | Performed by: INTERNAL MEDICINE

## 2023-09-19 PROCEDURE — 85610 PROTHROMBIN TIME: CPT | Performed by: INTERNAL MEDICINE

## 2023-09-19 PROCEDURE — 85025 COMPLETE CBC W/AUTO DIFF WBC: CPT | Performed by: INTERNAL MEDICINE

## 2023-09-19 PROCEDURE — 36415 COLL VENOUS BLD VENIPUNCTURE: CPT | Performed by: INTERNAL MEDICINE

## 2023-09-20 ENCOUNTER — ANESTHESIA EVENT (OUTPATIENT)
Dept: GASTROENTEROLOGY | Facility: HOSPITAL | Age: 68
End: 2023-09-20
Payer: MEDICARE

## 2023-09-21 ENCOUNTER — ANESTHESIA (OUTPATIENT)
Dept: GASTROENTEROLOGY | Facility: HOSPITAL | Age: 68
End: 2023-09-21
Payer: MEDICARE

## 2023-09-21 ENCOUNTER — HOSPITAL ENCOUNTER (OUTPATIENT)
Facility: HOSPITAL | Age: 68
Setting detail: HOSPITAL OUTPATIENT SURGERY
Discharge: HOME | End: 2023-09-21
Attending: INTERNAL MEDICINE | Admitting: INTERNAL MEDICINE
Payer: MEDICARE

## 2023-09-21 VITALS
WEIGHT: 216.2 LBS | SYSTOLIC BLOOD PRESSURE: 106 MMHG | BODY MASS INDEX: 38.31 KG/M2 | HEART RATE: 56 BPM | RESPIRATION RATE: 10 BRPM | HEIGHT: 63 IN | DIASTOLIC BLOOD PRESSURE: 68 MMHG | TEMPERATURE: 98 F | OXYGEN SATURATION: 97 %

## 2023-09-21 DIAGNOSIS — R05.3 CHRONIC COUGH: ICD-10-CM

## 2023-09-21 PROCEDURE — 25010000002 PROPOFOL 1000 MG/100ML EMULSION: Performed by: NURSE ANESTHETIST, CERTIFIED REGISTERED

## 2023-09-21 PROCEDURE — 88108 CYTOPATH CONCENTRATE TECH: CPT | Performed by: INTERNAL MEDICINE

## 2023-09-21 PROCEDURE — 87205 SMEAR GRAM STAIN: CPT | Performed by: INTERNAL MEDICINE

## 2023-09-21 PROCEDURE — 0202U NFCT DS 22 TRGT SARS-COV-2: CPT | Performed by: INTERNAL MEDICINE

## 2023-09-21 PROCEDURE — 87798 DETECT AGENT NOS DNA AMP: CPT | Performed by: INTERNAL MEDICINE

## 2023-09-21 PROCEDURE — 87385 HISTOPLASMA CAPSUL AG IA: CPT | Performed by: INTERNAL MEDICINE

## 2023-09-21 PROCEDURE — 87071 CULTURE AEROBIC QUANT OTHER: CPT | Performed by: INTERNAL MEDICINE

## 2023-09-21 PROCEDURE — 87116 MYCOBACTERIA CULTURE: CPT | Performed by: INTERNAL MEDICINE

## 2023-09-21 PROCEDURE — 87102 FUNGUS ISOLATION CULTURE: CPT | Performed by: INTERNAL MEDICINE

## 2023-09-21 PROCEDURE — 87206 SMEAR FLUORESCENT/ACID STAI: CPT | Performed by: INTERNAL MEDICINE

## 2023-09-21 RX ORDER — SODIUM CHLORIDE 9 MG/ML
50 INJECTION, SOLUTION INTRAVENOUS CONTINUOUS
Status: DISCONTINUED | OUTPATIENT
Start: 2023-09-21 | End: 2023-09-21 | Stop reason: HOSPADM

## 2023-09-21 RX ORDER — LIDOCAINE HYDROCHLORIDE 20 MG/ML
INJECTION, SOLUTION INFILTRATION; PERINEURAL AS NEEDED
Status: DISCONTINUED | OUTPATIENT
Start: 2023-09-21 | End: 2023-09-21 | Stop reason: SURG

## 2023-09-21 RX ORDER — PROPOFOL 10 MG/ML
INJECTION, EMULSION INTRAVENOUS AS NEEDED
Status: DISCONTINUED | OUTPATIENT
Start: 2023-09-21 | End: 2023-09-21 | Stop reason: SURG

## 2023-09-21 RX ORDER — LIDOCAINE HYDROCHLORIDE 20 MG/ML
INJECTION, SOLUTION INFILTRATION; PERINEURAL AS NEEDED
Status: DISCONTINUED | OUTPATIENT
Start: 2023-09-21 | End: 2023-09-21 | Stop reason: HOSPADM

## 2023-09-21 RX ADMIN — PROPOFOL INJECTABLE EMULSION 30 MG: 10 INJECTION, EMULSION INTRAVENOUS at 07:55

## 2023-09-21 RX ADMIN — SODIUM CHLORIDE 50 ML/HR: 9 INJECTION, SOLUTION INTRAVENOUS at 07:01

## 2023-09-21 RX ADMIN — PROPOFOL INJECTABLE EMULSION 100 MG: 10 INJECTION, EMULSION INTRAVENOUS at 07:52

## 2023-09-21 RX ADMIN — LIDOCAINE HYDROCHLORIDE 100 MG: 20 INJECTION, SOLUTION INFILTRATION; PERINEURAL at 07:52

## 2023-09-21 NOTE — DISCHARGE INSTRUCTIONS
Do not drink alcohol, drive, operate any heavy machinery or power tools, or make any important/legal decisions for the next 24 hours.    Call you doctor immediately if you experience severe chest pain, shortness of breath, bleeding or coughing up blood, or fever over 101 F.    Diet: Nothing by mouth until  10 AM    After a bronchoscopy, you may experience a scratchy throat. This will gradually get better. You may gargle with warm salt water for this after the time noted above is over.     A responsible adult should stay with you and you should rest quietly for the rest of the day. Follow up with MD as instructed.

## 2023-09-21 NOTE — H&P
Patient Care Team:  Jona Vieira MD as PCP - General (Internal Medicine)  Alejandro Smith MD as Consulting Physician (Cardiac Electrophysiology)  Luis Carlos Travis MD as Consulting Physician (Urology)  Robe Gaviria MD as Consulting Physician (Gastroenterology)    Chief complaint chronic cough        Assessment & Plan   Chronic cough  Hypertension  Dyslipidemia  TRINO on CPAP  Dyslipidemia  GERD    Plan:  Bronchoscopy to inspect the airways for source of cough and to obtain BAL from left lower lobe      History  67-year-old male who was seen 3 days ago in our office with complaints of chronic cough.  He he has been treated for postnasal drainage, GERD and reactive airway disease with Advair and still he was coughing on a daily basis.  CT scan of the chest 7/17/2023 showed mild scarring in the left lower lobe.      * No active hospital problems. *      Past Medical History:   Diagnosis Date    Bradycardia 08/16/2019    Followed by cardiologist with holter monitor every 6 months.    Cardiac arrhythmia 08/16/2019    COVID 05/2022    Gastroesophageal reflux disease 01/14/2019    History of kidney stones     Hyperlipidemia 08/16/2019    Hypertension 2017    Losartan    Kidney stone     follows w/ [facility]    Onychomycosis     follow w/ podiatry- Dr Stock    Rectal prolapse 05/01/2018    Sleep apnea     Cpap    Thyroid nodule     Dr PRATHER    Umbilical hernia 09/2022       Past Surgical History:   Procedure Laterality Date    COLONOSCOPY      DENTAL PROCEDURE      FINGER SURGERY  03/2022    cyst removal    HERNIA REPAIR Right     inguinal w/ Lilia    MASS EXCISION N/A 01/12/2021    Procedure: lipoma excisions to abdomen, bilateral upper lateral thighs and bialteral arms;  Surgeon: Williams Stafford DO;  Location: [facility] MAIN OR;  Service: General;  Laterality: N/A;    THYROID SURGERY      UMBILICAL HERNIA REPAIR N/A 09/30/2022    Procedure: UMBILICAL HERNIA REPAIR with mesh;  Surgeon: Robe Torres  MD Fuentes;  Location: [facility] MAIN OR;  Service: General;  Laterality: N/A;    US GUIDED FINE NEEDLE ASPIRATION      Dr PRATHER       Family History   Problem Relation Age of Onset    Lung cancer Mother         large cell    Cancer Mother         Large cell melanoma from lung cancer, smoking related. , 3/26/94    No Known Problems Father     Birth defects Brother         hole between left and right ventricles at birth.  at age 6 in , 3 days after experimental surgery.    No Known Problems Maternal Grandmother     No Known Problems Maternal Grandfather     No Known Problems Paternal Grandmother     No Known Problems Paternal Grandfather     No Known Problems Sister        Social History     Socioeconomic History    Marital status:     Number of children: 7   Tobacco Use    Smoking status: Never     Passive exposure: Never    Smokeless tobacco: Never   Vaping Use    Vaping Use: Never used   Substance and Sexual Activity    Alcohol use: No    Drug use: Never    Sexual activity: Yes     Partners: Female     Birth control/protection: Condom       Review of Systems  Review of Systems   Constitutional: Negative for chills, fever and malaise/fatigue.   HENT: Negative.    Eyes: Negative.    Cardiovascular: Negative.    Respiratory: Positive for chronic cough    Skin: Negative.    Musculoskeletal: Negative.    Gastrointestinal: Negative.    Genitourinary: Negative.    Neurological: Negative.    Psychiatric/Behavioral: Negative.  Vital Signs  Temp:  [98 °F (36.7 °C)] 98 °F (36.7 °C)  Heart Rate:  [57] 57  Resp:  [10] 10  BP: (112)/(70) 112/70    Physical Exam:  Physical Exam  General Appearance:  Alert   HEENT:  Normocephalic, without obvious abnormality, Conjunctiva/corneas clear,.   Nares normal, no drainage     Neck:  Supple, symmetrical, trachea midline. No JVD.  Lungs /Chest wall:   CTA, respirations unlabored, symmetrical wall movement.     Heart:  Regular rate and rhythm, S1 S2 normal  Abdomen: Soft,  non-tender, no masses, no organomegaly.    Extremities: No edema, no clubbing or cyanosis    Radiology  Imaging Results (Last 24 Hours)       ** No results found for the last 24 hours. **            Labs:  Results from last 7 days   Lab Units 09/19/23  1056   WBC 10*3/mm3 5.99   HEMOGLOBIN g/dL 14.9   HEMATOCRIT % 44.1   PLATELETS 10*3/mm3 217           Invalid input(s): LABALBU, PROT                      Results from last 7 days   Lab Units 09/19/23  1056   INR  0.96   APTT seconds 25.3               Meds:   SCHEDULE     Infusions  sodium chloride, 50 mL/hr, Last Rate: 50 mL/hr (09/21/23 0744)      PRNs    lidocaine      I discussed the patient's findings and my recommendations with patient, family, and nursing staff.     Jasper Bobo MD  09/21/23  08:03 EDT

## 2023-09-21 NOTE — ANESTHESIA PREPROCEDURE EVALUATION
Anesthesia Evaluation     Patient summary reviewed and Nursing notes reviewed   no history of anesthetic complications:   NPO Solid Status: > 8 hours  NPO Liquid Status: > 8 hours           Airway   Mallampati: II  TM distance: >3 FB  Neck ROM: full  No difficulty expected  Dental - normal exam     Pulmonary - normal exam   (+) COPD,sleep apnea on CPAP  Cardiovascular - normal exam    ECG reviewed  PT is on anticoagulation therapy    (+) hypertension, dysrhythmias Bradycardia, hyperlipidemia      Neuro/Psych  (+) headaches  GI/Hepatic/Renal/Endo    (+) GERD, renal disease stones, thyroid problem thyroid nodules    Musculoskeletal     Abdominal  - normal exam   Substance History      OB/GYN          Other        ROS/Med Hx Other: Umbilical hernia, allergies, migraines, BPH, odynophagia, low vit D    PSH  HERNIA REPAIR COLONOSCOPY  DENTAL PROCEDURE MASS EXCISION  US GUIDED FINE NEEDLE ASPIRATION FINGER SURGERY                  Anesthesia Plan    ASA 3     general     (Patient identified; pre-operative vital signs, all relevant labs/studies, complete medical/surgical/anesthetic history, full medication list, full allergy list, and NPO status obtained/reviewed; physical assessment performed; anesthetic options, side effects, potential complications, risks, and benefits discussed; questions answered; written anesthesia consent obtained; patient cleared for procedure; anesthesia machine and equipment checked and functioning)  intravenous induction     Anesthetic plan, risks, benefits, and alternatives have been provided, discussed and informed consent has been obtained with: patient.    Plan discussed with CRNA.      CODE STATUS:

## 2023-09-21 NOTE — ANESTHESIA POSTPROCEDURE EVALUATION
Patient: Nehemias Strong Jr.    Procedure Summary       Date: 09/21/23 Room / Location: [facility] ENDOSCOPY 3 / [facility] ENDOSCOPY    Anesthesia Start: 0744 Anesthesia Stop: 0801    Procedure: BRONCHOSCOPY with left lower lobe bronchioalveolar lavage (Bronchus) Diagnosis:       Chronic cough      (Chronic cough [R05.3])    Surgeons: Jasper Bobo MD Provider: Edilberto Ochoa MD    Anesthesia Type: general ASA Status: 3            Anesthesia Type: general    Vitals  Vitals Value Taken Time   /77 09/21/23 0837   Temp     Pulse 53 09/21/23 0843   Resp 10 09/21/23 0831   SpO2 94 % 09/21/23 0843   Vitals shown include unvalidated device data.        Post Anesthesia Care and Evaluation    Patient location during evaluation: PACU  Patient participation: complete - patient participated  Level of consciousness: awake  Pain scale: See nurse's notes for pain score.  Pain management: adequate    Airway patency: patent  Anesthetic complications: No anesthetic complications  PONV Status: none  Cardiovascular status: acceptable  Respiratory status: acceptable and spontaneous ventilation  Hydration status: acceptable    Comments: Patient seen and examined postoperatively; vital signs stable; SpO2 greater than or equal to 90%; cardiopulmonary status stable; nausea/vomiting adequately controlled; pain adequately controlled; no apparent anesthesia complications; patient discharged from anesthesia care when discharge criteria were met

## 2023-09-21 NOTE — PROCEDURES
Bronchoscopy Procedure Note    Procedure:  Bronchoscopy, Diagnostic  Bronchoscopy, Therapeutic of multiple mucous plugs  Bronchoalveolar lavage, BAL from left lower lobe    Pre-Operative Diagnosis: Chronic cough    Post-Operative Diagnosis: Same and multiple mucous plugs    Indication: Chronic cough, not improving with aggressive treatment for postnasal drainage, GERD or reactive airway disease    Anesthesia: Monitored Anesthesia Care (MAC)    Procedure Details: Patient was consented for the procedure with all risk and benefit of the procedure explained in detail.  Patient was given the opportunity to ask questions and all concerns were answered.    Timeout was done in the standard manner   the bronchoscope was inserted into the main airway via the oropharynx. An anatomical survey was done of the main airways and the subsegmental bronchus of the 5 lobes.  The findings are consistent of multiple mucous plugs from the trachea down with the color ranging from clear to white, otherwise no endobronchial lesions.  A therapeutic lavage was performed using aliquots of normal saline instilled into the airways then aspirated back until clear.  A diagnostic BAL was performed left lower lobe by instilling 120 mL of sterile normal saline and return of 40 mL.    Estimated Blood Loss: None           Specimens: BAL from left lower lobe                Complications:  None; patient tolerated the procedure well.           Disposition: PACU - hemodynamically stable.    Post op plan:  Resume p.o. after 2 hours  Follow-up results as an outpatient    Patient tolerated the procedure well.

## 2023-09-22 LAB
LAB AP CASE REPORT: NORMAL
NIGHT BLUE STAIN TISS: NORMAL
PATH REPORT.FINAL DX SPEC: NORMAL
PATH REPORT.GROSS SPEC: NORMAL

## 2023-09-23 LAB
BACTERIA SPEC AEROBE CULT: NORMAL
GRAM STN SPEC: NORMAL
GRAM STN SPEC: NORMAL

## 2023-09-26 LAB
P JIROVECII DNA L RESP QL NAA+NON-PROBE: NEGATIVE
REF LAB TEST METHOD: NORMAL
REF LAB TEST METHOD: NORMAL

## 2023-09-28 LAB
FUNGUS WND CULT: ABNORMAL
MYCOBACTERIUM SPEC CULT: NORMAL
NIGHT BLUE STAIN TISS: NORMAL

## 2023-10-05 LAB
MYCOBACTERIUM SPEC CULT: NORMAL
NIGHT BLUE STAIN TISS: NORMAL

## 2023-10-09 ENCOUNTER — TRANSCRIBE ORDERS (OUTPATIENT)
Dept: SPEECH THERAPY | Facility: HOSPITAL | Age: 68
End: 2023-10-09
Payer: OTHER GOVERNMENT

## 2023-10-09 DIAGNOSIS — R49.0 HOARSE: Primary | ICD-10-CM

## 2023-10-12 LAB
MYCOBACTERIUM SPEC CULT: NORMAL
NIGHT BLUE STAIN TISS: NORMAL

## 2023-10-14 DIAGNOSIS — I10 PRIMARY HYPERTENSION: ICD-10-CM

## 2023-10-16 RX ORDER — LOSARTAN POTASSIUM 25 MG/1
25 TABLET ORAL DAILY
Qty: 90 TABLET | Refills: 1 | Status: SHIPPED | OUTPATIENT
Start: 2023-10-16

## 2023-10-17 ENCOUNTER — HOSPITAL ENCOUNTER (OUTPATIENT)
Dept: SPEECH THERAPY | Facility: HOSPITAL | Age: 68
Discharge: HOME | End: 2023-10-17
Admitting: OTOLARYNGOLOGY
Payer: MEDICARE

## 2023-10-17 ENCOUNTER — HOSPITAL ENCOUNTER (OUTPATIENT)
Dept: SPEECH THERAPY | Facility: HOSPITAL | Age: 68
Discharge: HOME | End: 2023-10-17
Payer: MEDICARE

## 2023-10-17 DIAGNOSIS — R49.0 HOARSE: ICD-10-CM

## 2023-10-17 PROCEDURE — 31579 LARYNGOSCOPY TELESCOPIC: CPT

## 2023-10-17 PROCEDURE — 92524 BEHAVRAL QUALIT ANALYS VOICE: CPT

## 2023-10-17 NOTE — THERAPY EVALUATION
Outpatient Speech Language Pathology   Adult/Peds Voice Initial Evaluation   Amarjit     Patient Name: Nehemias Strong Jr.  : 1955  MRN: [id number]  Today's Date: 10/17/2023         Visit Date: 10/17/2023   Patient Active Problem List   Diagnosis    Vitamin D deficiency    Migraine headache    Kidney stones    Idiopathic cyst of iris, ciliary body, or anterior chamber    Hyperlipidemia    Gastroesophageal reflux disease    Enlarged prostate    Bradycardia    Cardiac arrhythmia    Allergic rhinitis    Benign neoplasm of skin of eyelid    Family history of lung cancer    Other specified diseases of anus and rectum    Odynophagia    Plantar fasciitis    Rectal prolapse    Annual physical exam    Thyroid nodule    Primary hypertension    Lipoma    Obstructive lung disease (generalized)    Clinical diagnosis of severe acute respiratory syndrome coronavirus 2 (SARS-CoV-2) disease    Encounter for [employer] ([employer]) examination    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    Cardiac arrhythmia 2019    COVID 2022    Gastroesophageal reflux disease 2019    History of kidney stones     Hyperlipidemia 2019    Hypertension 2017    Losartan    Kidney stone     follows w/ Medley    Onychomycosis     follow w/ podiatry- Dr Stock    Rectal prolapse 2018    Sleep apnea     Cpap    Thyroid nodule     Dr PRATHER    Umbilical hernia 2022        Past Surgical History:   Procedure Laterality Date    BRONCHOSCOPY N/A 2023    Procedure: BRONCHOSCOPY with left lower lobe bronchioalveolar lavage;  Surgeon: Jasper Bobo MD;  Location: [facility];  Service: Pulmonary;  Laterality: N/A;    COLONOSCOPY      DENTAL PROCEDURE      FINGER SURGERY  2022    cyst removal    HERNIA REPAIR Right     inguinal w/ Lilia    MASS  EXCISION N/A 01/12/2021    Procedure: lipoma excisions to abdomen, bilateral upper lateral thighs and bialteral arms;  Surgeon: Williams Stafford DO;  Location: [facility];  Service: General;  Laterality: N/A;    THYROID SURGERY      UMBILICAL HERNIA REPAIR N/A 09/30/2022    Procedure: UMBILICAL HERNIA REPAIR with mesh;  Surgeon: oRbe Torres MD;  Location: [facility];  Service: General;  Laterality: N/A;    US GUIDED FINE NEEDLE ASPIRATION      Dr PRATHER         Visit Dx:  hoarseness      Patient's Medical Hx:  Past Medical History:   Diagnosis Date    Bradycardia 08/16/2019    Followed by cardiologist with holter monitor every 6 months.    Cardiac arrhythmia 08/16/2019    COVID 05/2022    Gastroesophageal reflux disease 01/14/2019    History of kidney stones     Hyperlipidemia 08/16/2019    Hypertension 2017    Losartan    Kidney stone     follows w/ Medley    Onychomycosis     follow w/ podiatry- Dr Stock    Rectal prolapse 05/01/2018    Sleep apnea     Cpap    Thyroid nodule     Dr PRATHER    Umbilical hernia 09/2022       PATIENT MEDICAL HISTORY:   Nehemias Strong Jr. is a 67 y.o. male who [facility] this date for a Voice Evaluation and Video Stroboscopy Evaluation by ENT  Dr. De La Rosa.     Pt complains of hoarseness, weak volume, fatigue, pain in throat while speaking, swallowing difficulties, burning sensation in chest or throat, and crackling as speaking continues. Pt reports that problems have persisted for the last 5-6 months at current degree of difficulty however hoarseness has been occurring off and on for the two years prior. Pt describes difficulty talking to grandchildren due to irritations. Reports noticing that he is unable to sing in [employer] like he used to, voice is now soft.     VOCAL HYGIENE:  The patient reports daily vocal usage for ADL is average to above average. Pt reports primary communication partners are his wife, children, grandchildren. Pt endorses  "need to repeat himself frequently secondary to so hearing impairment. Daily liquid consumption is comprised of the following: 3 liters a day per referring provider for the last year, ice tea, pineapple juice, intermittent soda. Patient states he tries to \"steer away from carbonated beverages.\". 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 The patient endorses some of the below listed which may contribute to possible vocally abusive behaviors/instances:     Throat clearing, increased stress level, frequent exercise, frequent whispering    The patient reports taking the following medications:           The following medications and/or drug class were noted to have an impact on voice when cross referenced with the [facility] for Voice and Speech medication database:             Pharyngitis has been reported frequently during duloxetine administration (9% vs. 5% placebo). In clinical trials, cough was reported in 3--6% of those on duloxetine versus 3% of those receiving placebo.        Dry mouth may occur. The use of sedatives may produce an uninhibited or diminished drive to speak. Symptoms of dysarthria (slow, slurred and uncoordinated speech movements) may also be linked to sedative use.    SSRI's may have a drying effect on mucous membranes that may cause hoarseness, sore throat, voice changes or laryngitis. In addition to irritation, dry vocal folds may be more proven to injuries, such as nodules.    Anticonvulsants may produce an uninhibited or diminished drive to speak. Symptoms of dysarthria (slow, slurred and uncoordinated speech movements) may also be linked to their use.    Narcotics may produce an uninhibited or diminished drive to speak. Symptoms of dysarthria (slow, slurred and uncoordinated speech movements) may also be linked to narcotic use.    Calcium channel blockers such as " amolodipine should be used cautiously in patients with gastroesophageal reflux disease (GERD) or hiatal hernia associated with reflux esophagitis. The drugs relax the lower esophageal sphincter. Note that voice production can be adversely affected by GERD. Excessive coughing has been associated with the use of ACE inhibitors, which in turn, may lead to hoarseness and possibly vocal tissue damage.          VOICE HANDICAP INDEX AND REFLUX SYMPTOM INDEX:    REFLUX SYMPTOM INDEX:     Pt completed the Reflux Symptom Index (RSI). RSI is self rated on a scale of 0-5 (0= no problem and 5= severe problem)    Results as follows:      Hoarseness of a problem with your voice: 5  Clearing your throat: 5  Excess throat mucus or postnasal drip 3  Difficulty swallowing food, liquids, or pills 3  Coughing after you are or after lying down 3  Breathing difficulties or choking episodes 3  Troublesome or annoying cough 3  Sensations of something sticking in your throat 3  Heartburn, chest pain, indigestion, or stomach acid coming up 2      Total: 30    Normative data suggests that an RSI total of greater than or equal to 13 is clinically significant and may indicate uncontrolled reflux disease. (Normative date suggests that a RSI of greater than or equal to 13 is clinically significant. Therefore a RSI > 13 may be indicative of significant reflux disease.)    The Voice Handicap Index is a 30-item questionnaire introduced by Jose et al. as an instrument to quantify the psychosocial consequences of a voice disorder, either in terms of voice outcome or voice-related quality of life.    The Voice Handicap Index was completed by the patient with the following subsection scores:   Functional Impairment - 30   Physical - 31  Emotional - 34    This would equate to a composite score of 95 which would indicated a severe impact/vocal impairment.    Severity Common Association:  0-30 Mild Minimal amount of handicap   31-60 Moderate Often seen  "in patients with vocal nodules, polyps, or cysts    Severe Often seen in patients with vocal fold paralysis or severe vocal fold scarring.       EXAMINATION NOTES:    The patient was positioned up at a 90 degree angle. The rigid scope was introduced into the oral cavity. The vocal folds were able to be visualized upon phonation of vowel /i/. Glottic closure was *. Vertical length of approximation was *. * hyperfunction was noted. Arytenoid movement was observed to be *. Vibratory behavior of the vocal folds was *.                             ACOUSTIC MEASURES:    Additional acoustic and auditory parameters of voice were measured using the Seeqch IV. Fundamental Frequency for various speaking tasks ranged from 117.82 to 139.9 Htz (norm for an adult male is  Hz). Volume for this one on one environment measured at 59.12 dB. Pitch range was measured at 76.97 to 782.42. Maximum phonation time was 16.7 and 18.6 after cues to take a deep breath. S/Z ratio was measured at 2.86.  Research shows that an s:z ratio in excessive of 1.4 is correlated with pathological laryngeal function in relation to dysphonia and voice disorders. The patient used a \"shoulder, reversed, thoracic\" pattern of respiration during this session which was judged to be * for speech production purposes.       IMPRESSIONS:     The patient presents with a * voice disorder characterized by *. Teaching, counseling and written information was provided with extensive education regarding care/use of voice *.  he was provided a list of \"Do's and Don'ts for Vocal Hoarseness\". The patient expressed understanding and was in agreement with all of the information provided.     Thank you for referring this pleasant patient to our department and allowing us to participate in his care.       LONG TERM GOAL:    The patient will return voice to useful function for all daily needs, with independent use of compensations, as measured by by improved scores on " the Voice Handicap Index and via acoustic and perceptual evaluations.       SHORT TERM GOALS:     The patient will participate in the establishment and carryover of a home program to address reduction of phonotraumatic behaviors, be able to identify and eliminate these behaviors and develop and implement a vocal hygiene program.    2.    3.    4.    5.          Exam Detail  Vocal Fold Edge-Left:  Vocal Fold Edge-Right:    Glottic Closure:  Amplitude-Left:    Amplitude-Right:  Phase Closure:    Mucosal Wave-Left:  Mucosal Wave-Right:    Vertical Level of Approximation:  Vibratory Behavior-Left:    Vibratory Behavior-Right:  Phase Symmetry:    Ventricular Folds (Symmetry):  Ventricular Folds (Movement):    Periodicity (Regularity):  Arytenoids (Symmetry):    Hyperfunction:        Exam Summary  Examiner:  Endoscope Used:    Diagnosis:  Phonation Quality:    Resonance:  Breath Support:    Daily Vocal Use:        10/17/23                             Time Calculation:                       Joanie Rodriguez, Speech Therapy Student  10/17/2023

## 2023-10-17 NOTE — THERAPY EVALUATION
Outpatient Speech Language Pathology   Adult/Peds Voice Initial Evaluation   Amarjit     Patient Name: Nehemias Strong Jr.  : 1955  MRN: [id number]  Today's Date: 10/17/2023         Visit Date: 10/17/2023   Patient Active Problem List   Diagnosis    Vitamin D deficiency    Migraine headache    Kidney stones    Idiopathic cyst of iris, ciliary body, or anterior chamber    Hyperlipidemia    Gastroesophageal reflux disease    Enlarged prostate    Bradycardia    Cardiac arrhythmia    Allergic rhinitis    Benign neoplasm of skin of eyelid    Family history of lung cancer    Other specified diseases of anus and rectum    Odynophagia    Plantar fasciitis    Rectal prolapse    Annual physical exam    Thyroid nodule    Primary hypertension    Lipoma    Obstructive lung disease (generalized)    Clinical diagnosis of severe acute respiratory syndrome coronavirus 2 (SARS-CoV-2) disease    Encounter for [employer] ([employer]) examination    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    Cardiac arrhythmia 2019    COVID 2022    Gastroesophageal reflux disease 2019    History of kidney stones     Hyperlipidemia 2019    Hypertension 2017    Losartan    Kidney stone     follows w/ Medley    Onychomycosis     follow w/ podiatry- Dr Stock    Rectal prolapse 2018    Sleep apnea     Cpap    Thyroid nodule     Dr PRATHER    Umbilical hernia 2022        Past Surgical History:   Procedure Laterality Date    BRONCHOSCOPY N/A 2023    Procedure: BRONCHOSCOPY with left lower lobe bronchioalveolar lavage;  Surgeon: Jasper Bobo MD;  Location: [facility];  Service: Pulmonary;  Laterality: N/A;    COLONOSCOPY      DENTAL PROCEDURE      FINGER SURGERY  2022    cyst removal    HERNIA REPAIR Right     inguinal w/ Lilia    MASS  EXCISION N/A 01/12/2021    Procedure: lipoma excisions to abdomen, bilateral upper lateral thighs and bialteral arms;  Surgeon: Williams Stafford DO;  Location: [facility] OR;  Service: General;  Laterality: N/A;    THYROID SURGERY      UMBILICAL HERNIA REPAIR N/A 09/30/2022    Procedure: UMBILICAL HERNIA REPAIR with mesh;  Surgeon: Robe Torres MD;  Location: [facility];  Service: General;  Laterality: N/A;    US GUIDED FINE NEEDLE ASPIRATION      Dr PRATHER         Visit Dx:  No diagnosis found.              Patient Info    Name: Nehemias Strong Jr.   Gender: male     YOB: 1955   Referring Physician:    Original Complaint:    Patient Primary Diagnosis:      Patient's Social Hx:  Social History     Social History Narrative    Lives w/ wife. Flies for [employer]        Patient's Medical Hx:  Past Medical History:   Diagnosis Date    Bradycardia 08/16/2019    Followed by cardiologist with holter monitor every 6 months.    Cardiac arrhythmia 08/16/2019    COVID 05/2022    Gastroesophageal reflux disease 01/14/2019    History of kidney stones     Hyperlipidemia 08/16/2019    Hypertension 2017    Losartan    Kidney stone     follows w/ Medley    Onychomycosis     follow w/ podiatry- Dr Stock    Rectal prolapse 05/01/2018    Sleep apnea     Cpap    Thyroid nodule     Dr PRATHER    Umbilical hernia 09/2022       PATIENT MEDICAL HISTORY:     Nehemias Strong Jr. is a 67 y.o. male who presents to [facility] this date for a Voice Evaluation and Video Stroboscopy Evaluation by ENT  Dr. De La Rosa.     Pt complains of hoarseness, weak volume, fatigue, pain in throat while speaking, swallowing difficulties, burning sensation in chest or throat, and crackling as speaking continues. Pt reports that problems have persisted for the last 5-6 months at current degree of difficulty however hoarseness has been occurring off and on for the two years prior. Pt describes difficulty talking to grandchildren due  "to irritations. Reports noticing that he is unable to sing in [employer] like he used to, voice is now soft.       VOCAL HYGIENE:    The patient reports daily vocal usage for ADL is *. Daily liquid consumption is comprised of the following: 3 liters a day per referring provider for the last year, ice tea, pineapple juice, intermittent soda. Patient states he tries to \"steer away from carbonated beverages.\". 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 The patient endorses some of the below listed which may contribute to possible vocally abusive behaviors/instances:     Throat clearing, increased stress level, frequent exercise, frequent whispering    The patient reports taking the following medications:           The following medications and/or drug class were noted to have an impact on voice when cross referenced with the [employer] [facility] for [employer] [facility] [employer] medication database:             Pharyngitis has been reported frequently during duloxetine administration (9% vs. 5% placebo). In clinical trials, cough was reported in 3--6% of those on duloxetine versus 3% of those receiving placebo.        Dry mouth may occur. The use of sedatives may produce an uninhibited or diminished drive to speak. Symptoms of dysarthria (slow, slurred and uncoordinated speech movements) may also be linked to sedative use.    SSRI's may have a drying effect on mucous membranes that may cause hoarseness, sore throat, voice changes or laryngitis. In addition to irritation, dry vocal folds may be more proven to injuries, such as nodules.    Anticonvulsants may produce an uninhibited or diminished drive to speak. Symptoms of dysarthria (slow, slurred and uncoordinated speech movements) may also be linked to their use.    Narcotics may produce an uninhibited or diminished drive to speak. Symptoms of dysarthria (slow, slurred and " uncoordinated speech movements) may also be linked to narcotic use.    Calcium channel blockers such as amolodipine should be used cautiously in patients with gastroesophageal reflux disease (GERD) or hiatal hernia associated with reflux esophagitis. The drugs relax the lower esophageal sphincter. Note that voice production can be adversely affected by GERD. Excessive coughing has been associated with the use of ACE inhibitors, which in turn, may lead to hoarseness and possibly vocal tissue damage.          VOICE HANDICAP INDEX AND REFLUX SYMPTOM INDEX:    REFLUX SYMPTOM INDEX:     Pt completed the Reflux Symptom Index (RSI). RSI is self rated on a scale of 0-5 (0= no problem and 5= severe problem)    Results as follows:      Hoarseness of a problem with your voice: 5  Clearing your throat: 5  Excess throat mucus or postnasal drip 3  Difficulty swallowing food, liquids, or pills 3  Coughing after you are or after lying down 3  Breathing difficulties or choking episodes 3  Troublesome or annoying cough 3  Sensations of something sticking in your throat 3  Heartburn, chest pain, indigestion, or stomach acid coming up 2      Total: 30    Normative data suggests that an RSI total of greater than or equal to 13 is clinically significant and may indicate uncontrolled reflux disease. (Normative date suggests that a RSI of greater than or equal to 13 is clinically significant. Therefore a RSI > 13 may be indicative of significant reflux disease.)    The Voice Handicap Index is a 30-item questionnaire introduced by Jose et al. as an instrument to quantify the psychosocial consequences of a voice disorder, either in terms of voice outcome or voice-related quality of life.    The Voice Handicap Index was completed by the patient with the following subsection scores:   Functional Impairment - 30   Physical - 31  Emotional - 34    This would equate to a composite score of 95 which would indicated a severe impact/vocal  "impairment.    Severity Common Association:  0-30 Mild Minimal amount of handicap   31-60 Moderate Often seen in patients with vocal nodules, polyps, or cysts    Severe Often seen in patients with vocal fold paralysis or severe vocal fold scarring.       EXAMINATION NOTES:    The patient was positioned up at a 90 degree angle. The rigid scope was introduced into the oral cavity. The vocal folds were able to be visualized upon phonation of vowel /i/. Glottic closure was *. Vertical length of approximation was *. * hyperfunction was noted. Arytenoid movement was observed to be *. Vibratory behavior of the vocal folds was *.     ACOUSTIC MEASURES:    Additional acoustic and auditory parameters of voice were measured using the Listar IV. Fundamental Frequency for various speaking tasks ranged from 117.82 to 139.9 Htz (norm for an adult male is  Hz). Volume for this one on one environment measured at 59.12 dB. Pitch range was measured at 76.97 to 782.42. Maximum phonation time was 16.7 and 18.6 after cues to take a deep breath. S/Z ratio was measured at 2.86.  Research shows that an s:z ratio in excessive of 1.4 is correlated with pathological laryngeal function in relation to dysphonia and voice disorders. The patient used a \"shoulder, reversed, thoracic\" pattern of respiration during this session which was judged to be * for speech production purposes.       IMPRESSIONS:     The patient presents with a * voice disorder characterized by *. Teaching, counseling and written information was provided with extensive education regarding care/use of voice *.  he was provided a list of \"Do's and Don'ts for Vocal Hoarseness\". The patient expressed understanding and was in agreement with all of the information provided.     Thank you for referring this pleasant patient to our department and allowing us to participate in his care.       LONG TERM GOAL:    The patient will return voice to useful function for all " daily needs, with independent use of compensations, as measured by by improved scores on the Voice Handicap Index and via acoustic and perceptual evaluations.       SHORT TERM GOALS:     The patient will participate in the establishment and carryover of a home program to address reduction of phonotraumatic behaviors, be able to identify and eliminate these behaviors and develop and implement a vocal hygiene program.    2.    3.    4.    5.          Exam Detail  Vocal Fold Edge-Left:  Vocal Fold Edge-Right:    Glottic Closure:  Amplitude-Left:    Amplitude-Right:  Phase Closure:    Mucosal Wave-Left:  Mucosal Wave-Right:    Vertical Level of Approximation:  Vibratory Behavior-Left:    Vibratory Behavior-Right:  Phase Symmetry:    Ventricular Folds (Symmetry):  Ventricular Folds (Movement):    Periodicity (Regularity):  Arytenoids (Symmetry):    Hyperfunction:        Exam Summary  Examiner:  Endoscope Used:    Diagnosis:  Phonation Quality:    Resonance:  Breath Support:    Daily Vocal Use:        10/17/23                             Time Calculation:                       Joanie Rodriguez, Speech Therapy Student  10/17/2023

## 2023-10-19 LAB
FUNGUS WND CULT: ABNORMAL
MYCOBACTERIUM SPEC CULT: NORMAL
NIGHT BLUE STAIN TISS: NORMAL

## 2023-10-26 LAB
MYCOBACTERIUM SPEC CULT: NORMAL
NIGHT BLUE STAIN TISS: NORMAL

## 2023-11-02 ENCOUNTER — HOSPITAL ENCOUNTER (OUTPATIENT)
Dept: SPEECH THERAPY | Facility: HOSPITAL | Age: 68
Discharge: HOME | End: 2023-11-02
Payer: MEDICARE

## 2023-11-02 LAB
MYCOBACTERIUM SPEC CULT: NORMAL
NIGHT BLUE STAIN TISS: NORMAL

## 2023-11-02 PROCEDURE — 92507 TX SP LANG VOICE COMM INDIV: CPT

## 2023-11-02 NOTE — THERAPY TREATMENT NOTE
Outpatient Speech Language Pathology   Adult/Peds Voice Treatment Note  TYL[facility] Amarjit     Patient Name: Nehemias Strong Jr.  : 1955  MRN: [id number]  Today's Date: 2023           Visit Date: 2023      Patient Active Problem List   Diagnosis    Vitamin D deficiency    Migraine headache    Kidney stones    Idiopathic cyst of iris, ciliary body, or anterior chamber    Hyperlipidemia    Gastroesophageal reflux disease    Enlarged prostate    Bradycardia    Cardiac arrhythmia    Allergic rhinitis    Benign neoplasm of skin of eyelid    Family history of lung cancer    Other specified diseases of anus and rectum    Odynophagia    Plantar fasciitis    Rectal prolapse    Annual physical exam    Thyroid nodule    Primary hypertension    Lipoma    Obstructive lung disease (generalized)    Clinical diagnosis of severe acute respiratory syndrome coronavirus 2 (SARS-CoV-2) disease    Encounter for [employer] ([employer]) examination    Dvrtclos of lg int w/o perforation or abscess w/o bleeding       Visit Dx:  hoarseness     SLP OP Goals       Row Name 23 1100       Goal Type Needed    Goal Type Needed Other Adult Goals  -MC       Subjective Comments    Subjective Comments ***  -MC       Other Goals    Other Adult Goal- 1 Patient will be able to use voice in therapy, vocational, and avocational activities without functional limitations due to hoarseness as measured by improved scores on the Voice Handicap Index and via acoustic and perceptual evaluations.  -MC    Status: Other Adult Goal- 1 Progressing as expected  -MC    Comments: Other Adult Goal- 1 ***  -MC    Other Adult Goal- 2 .Pt will demonstrate an understanding of the structures and functions of the phonatory/respiratory tract (respiration, phonation, resonance and articulation)  -MC    Status: Other Adult Goal- 2 Progressing as expected  -MC    Comments: Other Adult Goal- 2 ***  -MC    Other Adult Goal- 3 The patient will use easy  "onset/frontal focus exercises to decrease laryngeal tension at the phrase/sentence level in an effort to decrease instance of glottal mendoza/laryngeal tension  -MC    Status: Other Adult Goal- 3 Progressing as expected  -MC    Comments: Other Adult Goal- 3 ***  -MC    Other Adult Goal- 4 The patient will demonstrate primarily diaphragmatic movement with breath support at rest and with phonation respectively with 80% accuracy during structured tasks in therapy and via self report with home program  -MC    Status: Other Adult Goal- 4 Progressing as expected  -MC    Comments: Other Adult Goal- 4 ***  -MC    Other Adult Goal- 5 The patient will identify \"bumpy\" vs \"smooth\" voicing (glottal mendoza/pitch breaks vs more balanced optimal voicing) with biofeedback on the visipitch during therapy on 80% of opportunities.  -MC    Status: Other Adult Goal- 5 New;Revised  -MC    Comments: Other Adult Goal- 5 ***  -MC    Other Adult Goal- 6 Complete Stemple's vocal function exercises to optimize coordination of respiration and phonation, and ease of vocal onset.  -MC    Status: Other Adult Goal- 6 New  -MC    Comments: Other Adult Goal- 6 ***  -MC              User Key  (r) = Recorded By, (t) = Taken By, (c) = Cosigned By      Initials Name Provider Type    Hedy Chaney SLP Speech and Language Pathologist                Next session scheduled for 11-7 at 10:30 AM      The patient was emailed the following via [employer] for home program:    Access Code: [email or web address]  URL: [email or web address]  Date: 11/02/2023  Prepared by: Hedy Escamilla    Exercises  - Vocal Function Exercise-Warm Up Sustaining Vowel Sound (E sound on the F note)  - 2 x daily - 7 x weekly - 2 sets  - Vocal Function Exercise-Glide from Lowest Note to Highest Note  - 2 x daily - 7 x weekly - 2 sets  - Vocal Function Exercise-Glide Down from Highest to Lowest Note  - 2 x daily - 7 x weekly - 2 sets            EDY Khan  11/2/2023  "

## 2023-11-07 ENCOUNTER — HOSPITAL ENCOUNTER (OUTPATIENT)
Dept: SPEECH THERAPY | Facility: HOSPITAL | Age: 68
Discharge: HOME | End: 2023-11-07
Admitting: OTOLARYNGOLOGY
Payer: MEDICARE

## 2023-11-07 PROCEDURE — 92507 TX SP LANG VOICE COMM INDIV: CPT

## 2023-11-07 NOTE — THERAPY TREATMENT NOTE
Outpatient Speech Language Pathology   Adult/Peds Voice Treatment Note  [facility] Amarjit     Patient Name: Nehemias Strong Jr.  : 1955  MRN: [id number]  Today's Date: 2023           Visit Date: 2023      Patient Active Problem List   Diagnosis    Vitamin D deficiency    Migraine headache    Kidney stones    Idiopathic cyst of iris, ciliary body, or anterior chamber    Hyperlipidemia    Gastroesophageal reflux disease    Enlarged prostate    Bradycardia    Cardiac arrhythmia    Allergic rhinitis    Benign neoplasm of skin of eyelid    Family history of lung cancer    Other specified diseases of anus and rectum    Odynophagia    Plantar fasciitis    Rectal prolapse    Annual physical exam    Thyroid nodule    Primary hypertension    Lipoma    Obstructive lung disease (generalized)    Clinical diagnosis of severe acute respiratory syndrome coronavirus 2 (SARS-CoV-2) disease    Encounter for [employer] ([employer]) examination    Dvrtclos of lg int w/o perforation or abscess w/o bleeding       Visit Dx:  No diagnosis found.                               SLP OP Goals       Row Name 23 1300       Goal Type Needed    Goal Type Needed Other Adult Goals  -MC       Other Goals    Other Adult Goal- 1 Patient will be able to use voice in therapy, vocational, and avocational activities without functional limitations due to hoarseness as measured by improved scores on the Voice Handicap Index and via acoustic and perceptual evaluations.  -MC    Status: Other Adult Goal- 1 Progressing as expected  -MC    Comments: Other Adult Goal- 1 ***  -MC    Other Adult Goal- 2 .Pt will demonstrate an understanding of the structures and functions of the phonatory/respiratory tract (respiration, phonation, resonance and articulation)  -MC    Status: Other Adult Goal- 2 Progressing as expected  -MC    Comments: Other Adult Goal- 2 ***  -MC    Other Adult Goal- 3 The patient will use easy onset/frontal focus  "exercises to decrease laryngeal tension at the phrase/sentence level in an effort to decrease instance of glottal mendoza/laryngeal tension  -MC    Status: Other Adult Goal- 3 Progressing as expected  -MC    Comments: Other Adult Goal- 3 ***  -MC    Other Adult Goal- 4 The patient will demonstrate primarily diaphragmatic movement with breath support at rest and with phonation respectively with 80% accuracy during structured tasks in therapy and via self report with home program  -MC    Status: Other Adult Goal- 4 Progressing as expected  -MC    Comments: Other Adult Goal- 4 ***  -MC    Other Adult Goal- 5 The patient will identify \"bumpy\" vs \"smooth\" voicing (glottal mendoza/pitch breaks vs more balanced optimal voicing) with biofeedback on the visipitch during therapy on 80% of opportunities.  -MC    Status: Other Adult Goal- 5 Progressing as expected  -MC    Comments: Other Adult Goal- 5 ***  -MC    Other Adult Goal- 6 Complete Stemple's vocal function exercises to optimize coordination of respiration and phonation, and ease of vocal onset.  -              User Key  (r) = Recorded By, (t) = Taken By, (c) = Cosigned By      Initials Name Provider Type    Hedy Chaney, SLP Speech and Language Pathologist                          Time Calculation:                       EDY Khan  11/7/2023  "

## 2023-11-15 ENCOUNTER — TELEPHONE (OUTPATIENT)
Dept: FAMILY MEDICINE CLINIC | Facility: CLINIC | Age: 68
End: 2023-11-15

## 2023-11-15 ENCOUNTER — OFFICE (AMBULATORY)
Dept: URBAN - METROPOLITAN AREA CLINIC 64 | Facility: CLINIC | Age: 68
End: 2023-11-15

## 2023-11-15 VITALS
SYSTOLIC BLOOD PRESSURE: 133 MMHG | DIASTOLIC BLOOD PRESSURE: 77 MMHG | HEART RATE: 55 BPM | HEIGHT: 73 IN | WEIGHT: 214 LBS

## 2023-11-15 DIAGNOSIS — R49.0 DYSPHONIA: ICD-10-CM

## 2023-11-15 DIAGNOSIS — R13.10 DYSPHAGIA, UNSPECIFIED: ICD-10-CM

## 2023-11-15 DIAGNOSIS — R09.89 OTHER SPECIFIED SYMPTOMS AND SIGNS INVOLVING THE CIRCULATORY: ICD-10-CM

## 2023-11-15 PROCEDURE — 99214 OFFICE O/P EST MOD 30 MIN: CPT | Performed by: NURSE PRACTITIONER

## 2023-11-15 RX ORDER — PANTOPRAZOLE SODIUM 20 MG/1
TABLET, DELAYED RELEASE ORAL
Qty: 90 | Refills: 0 | Status: COMPLETED
End: 2024-04-25

## 2023-11-15 NOTE — TELEPHONE ENCOUNTER
"    Caller: Nehemias Strong Jr. \"Darrell\"    Relationship to patient: Self    Best call back number: [phone]    Patient is needing:     CALLING IN REGARDS TO A CHARGE ON HIS ACCOUNT FROM [facility] FROM 5.15.23.    HE GOT A SECOND CLASS AND WAS CHARGED FOR A FIRST CLASS.     WOULD LIKE TO SPEAK TO PRACTICE MANAGER ABOUT THIS.           "

## 2023-11-16 ENCOUNTER — HOSPITAL ENCOUNTER (OUTPATIENT)
Dept: SPEECH THERAPY | Facility: HOSPITAL | Age: 68
Discharge: HOME | End: 2023-11-16
Payer: MEDICARE

## 2023-11-16 ENCOUNTER — OFFICE VISIT (OUTPATIENT)
Dept: FAMILY MEDICINE CLINIC | Facility: CLINIC | Age: 68
End: 2023-11-16
Payer: MEDICARE

## 2023-11-16 VITALS
SYSTOLIC BLOOD PRESSURE: 122 MMHG | RESPIRATION RATE: 18 BRPM | HEART RATE: 61 BPM | BODY MASS INDEX: 29.13 KG/M2 | HEIGHT: 73 IN | WEIGHT: 219.8 LBS | DIASTOLIC BLOOD PRESSURE: 76 MMHG | OXYGEN SATURATION: 97 %

## 2023-11-16 DIAGNOSIS — J40 BRONCHITIS: ICD-10-CM

## 2023-11-16 DIAGNOSIS — R05.3 CHRONIC COUGH: ICD-10-CM

## 2023-11-16 DIAGNOSIS — R05.3 CHRONIC COUGH: Primary | ICD-10-CM

## 2023-11-16 PROBLEM — I25.10 CORONARY ARTERY CALCIFICATION: Status: ACTIVE | Noted: 2023-07-31

## 2023-11-16 PROBLEM — I25.84 CORONARY ARTERY CALCIFICATION: Status: ACTIVE | Noted: 2023-07-31

## 2023-11-16 PROBLEM — R13.10 DYSPHAGIA: Status: ACTIVE | Noted: 2023-11-16

## 2023-11-16 PROCEDURE — 99214 OFFICE O/P EST MOD 30 MIN: CPT | Performed by: FAMILY MEDICINE

## 2023-11-16 PROCEDURE — 3078F DIAST BP <80 MM HG: CPT | Performed by: FAMILY MEDICINE

## 2023-11-16 PROCEDURE — 1159F MED LIST DOCD IN RCRD: CPT | Performed by: FAMILY MEDICINE

## 2023-11-16 PROCEDURE — 1160F RVW MEDS BY RX/DR IN RCRD: CPT | Performed by: FAMILY MEDICINE

## 2023-11-16 PROCEDURE — 3074F SYST BP LT 130 MM HG: CPT | Performed by: FAMILY MEDICINE

## 2023-11-16 PROCEDURE — 92507 TX SP LANG VOICE COMM INDIV: CPT

## 2023-11-16 RX ORDER — PANTOPRAZOLE SODIUM 40 MG/1
40 TABLET, DELAYED RELEASE ORAL 2 TIMES DAILY
Qty: 60 TABLET | Refills: 1 | Status: SHIPPED | OUTPATIENT
Start: 2023-11-16 | End: 2023-11-16

## 2023-11-16 RX ORDER — FLUTICASONE PROPIONATE AND SALMETEROL XINAFOATE 230; 21 UG/1; UG/1
2 AEROSOL, METERED RESPIRATORY (INHALATION)
Qty: 12 G | Refills: 2 | Status: SHIPPED | OUTPATIENT
Start: 2023-11-16

## 2023-11-16 RX ORDER — OMEGA-3S/DHA/EPA/FISH OIL/D3 300MG-1000
CAPSULE ORAL
COMMUNITY

## 2023-11-16 RX ORDER — PANTOPRAZOLE SODIUM 40 MG/1
40 TABLET, DELAYED RELEASE ORAL 2 TIMES DAILY
Qty: 180 TABLET | Refills: 0 | Status: SHIPPED | OUTPATIENT
Start: 2023-11-16

## 2023-11-16 NOTE — PROGRESS NOTES
Chief Complaint   Patient presents with    Cough     HPI  Nehemias Strong Jr. is a 68 y.o. male that presents for   Chief Complaint   Patient presents with    Cough     Chronic cough: unclear etiology, ongoing since April 2022 but notably worse since April 2023. Maintained on antiacid (famotidine) since 2022 and more recently pantoprazole. Does carry diagnosis of COPD as 10/2021 PFTs revealed small airway obstructive lung disease but no smoking hx. Inadequate response to Advair, prednisone, azithro and doxycycline. 7/2023 CT w/ scarring to the LLL but otherwise clear. He was referred back to ENT and pulm at our visit in Sept 2023 w/ concern for atypical infection, perhaps MAC. Pulm performed bronch 9/21/23, which was unremarkable. Patient was evaluated by ENT (Dr PRATHER) 9/25/23 and referred to speech therapy. Speech therapy concerned for reflux and patient was referred to GI. They are planning upcoming EGD to evaluate for hiatal hernia. Currently maintained on pantoprazole 20 BID. Patient continues w/ occasionally productive cough, hoarseness, throat clearing and sore throat. Needing alternative for Advair as insurance won't cover    Review of Systems   HENT:  Positive for sore throat and voice change.    Respiratory:  Positive for cough and shortness of breath.      The following portions of the patient's history were reviewed and updated as appropriate: problem list, past medical history, past surgical history, allergies, current medication    Problem List Tab  Patient History Tab  Immunizations Tab  Medications Tab  Chart Review Tab  Care Everywhere Tab  Synopsis Tab    PE  Vitals:    11/16/23 0829   BP: 122/76   Pulse: 61   Resp: 18   SpO2: 97%     Body mass index is 29 kg/m².  General: Well nourished, NAD  Head: AT/NC  Eyes: EOMI, anicteric sclera  ENT: MMM w/o erythema. TM clear bilaterally  Neck: Supple, no thyromegaly or LAD  Resp: CTAB, SCR, BS equal  CV: RRR w/o m/r/g; 2+ pulses  GI: Soft, NT/ND, +BS  MSK:  FROM, no deformity, no edema  Skin: Warm, dry, intact  Neuro: Alert and oriented. No focal deficits  Psych: Appropriate mood and affect    Imaging  US Liver    Result Date: 7/28/2023  Impression: Small simple hepatic cysts which requires no follow-up. Electronically Signed: Osman Boggs  7/28/2023 12:19 PM EDT  Workstation ID: [id number]    CT Cardiac Calcium Score Without Dye    Result Date: 7/21/2023  Impression: 1. Coronary calcium score 587.4, extensive plaque burden highest within the LAD. 2. Mild cardiomegaly. Calcium Score Interpretation 0 -- Negative examination, no identifiable atherosclerotic plaque.  Very low risk of cardiac events in the next 5 years. 1-10 -- Minimal plaque burden.  Low risk of cardiac events in the next 5 years. 11- 100 -- Mild Plaque burden.  Mild risk of cardiac events in the next 5 years. 101 - 400 -- Moderate plaque burden.  Moderate risk of cardiac events in the next 5 years. Over 400 -- Extensive plaque burden.  High risk of cardiac events in the next 5 years. Electronically Signed: Yonathan Campbell MD  7/21/2023 9:16 AM EDT  Workstation ID: [id number]    Assessment & Plan   Nehemias Strong Jr. is a 68 y.o. male that presents for   Chief Complaint   Patient presents with    Cough     Diagnoses and all orders for this visit:    1. Chronic cough (Primary): unclear etiology, particularly w/ unremarkable bronch. Not responding to treatment for COPD, GERD, atypical respiratory infection/laryngitis. Consider hiatal hernia, EoE, esophageal candidiasis  -     Cont fluticasone-salmeterol (ADVAIR HFA) 230-21 MCG/ACT inhaler; Inhale 2 puffs 2 (Two) Times a Day.  Dispense: 12 g; Refill: 2  -     Increase pantoprazole (PROTONIX) 40 MG EC tablet; Take 1 tablet by mouth 2 (Two) Times a Day.  Dispense: 60 tablet; Refill: 1  - Cont ENT, pulm and GI f/u- appreciate recs    2. Bronchitis: noted on prior PFTs. Persistent cough despite adequate tx  -     Cont fluticasone-salmeterol (ADVAIR HFA) 230-21  MCG/ACT inhaler; Inhale 2 puffs 2 (Two) Times a Day.  Dispense: 12 g; Refill: 2       Return in about 8 weeks (around 1/11/2024) for Recheck- 30min- chronic cough.

## 2023-11-16 NOTE — THERAPY TREATMENT NOTE
Outpatient Speech Language Pathology   Adult/Peds Voice Initial Evaluation  [facility]     Patient Name: Nehemias Strong Jr.  : 1955  MRN: [id number]  Today's Date: 2023         Visit Date: 2023   Patient Active Problem List   Diagnosis    Vitamin D deficiency    Migraine headache    Kidney stones    Idiopathic cyst of iris, ciliary body, or anterior chamber    Hyperlipidemia    Gastroesophageal reflux disease    Enlarged prostate    Bradycardia    Cardiac arrhythmia    Allergic rhinitis    Benign neoplasm of skin of eyelid    Family history of lung cancer    Other specified diseases of anus and rectum    Odynophagia    Plantar fasciitis    Rectal prolapse    Annual physical exam    Thyroid nodule    Primary hypertension    Lipoma    Obstructive lung disease (generalized)    Clinical diagnosis of severe acute respiratory syndrome coronavirus 2 (SARS-CoV-2) disease    Encounter for [employer] (FAA) examination    Dvrtclos of lg 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    Cardiac arrhythmia 2019    COVID 2022    Gastroesophageal reflux disease 2019    History of kidney stones     Hyperlipidemia 2019    Hypertension 2017    Losartan    Kidney stone     follows w/ [facility]ley    Onychomycosis     follow w/ podiatry- Dr Stock    Rectal prolapse 2018    Sleep apnea     Cpap    Thyroid nodule     Dr PRATHER    Umbilical hernia 2022        Past Surgical History:   Procedure Laterality Date    BRONCHOSCOPY N/A 2023    Procedure: BRONCHOSCOPY with left lower lobe bronchioalveolar lavage;  Surgeon: Jasper Bobo MD;  Location: [facility];  Service: Pulmonary;  Laterality: N/A;    COLONOSCOPY      DENTAL PROCEDURE      FINGER SURGERY  2022    cyst removal    HERNIA  REPAIR Right     inguinal w/ Lilia    MASS EXCISION N/A 01/12/2021    Procedure: lipoma excisions to abdomen, bilateral upper lateral thighs and bialteral arms;  Surgeon: Williams Stafford DO;  Location: [facility] MAIN OR;  Service: General;  Laterality: N/A;    THYROID SURGERY      UMBILICAL HERNIA REPAIR N/A 09/30/2022    Procedure: UMBILICAL HERNIA REPAIR with mesh;  Surgeon: Robe Torres MD;  Location: [facility] MAIN OR;  Service: General;  Laterality: N/A;    US GUIDED FINE NEEDLE ASPIRATION      Dr PRATHER          SLP OP Goals       Row Name 11/16/23 1600       Goal Type Needed    Goal Type Needed --       Subjective Comments    Subjective Comments ***  -MC       Other Goals    Other Adult Goal- 1 Patient will be able to use voice in therapy, vocational, and avocational activities without functional limitations due to hoarseness as measured by improved scores on the Voice Handicap Index and via acoustic and perceptual evaluations.  -MC    Status: Other Adult Goal- 1 Progressing as expected  -MC    Comments: Other Adult Goal- 1 ***  -MC    Other Adult Goal- 2 .Pt will demonstrate an understanding of the structures and functions of the phonatory/respiratory tract (respiration, phonation, resonance and articulation)  -MC    Status: Other Adult Goal- 2 Progressing as expected  -MC    Comments: Other Adult Goal- 2 ***  -MC    Other Adult Goal- 3 The patient will use easy onset/frontal focus exercises to decrease laryngeal tension at the phrase/sentence level in an effort to decrease instance of glottal mendoza/laryngeal tension  -MC    Status: Other Adult Goal- 3 Progressing as expected  -MC    Comments: Other Adult Goal- 3 ***  -MC    Other Adult Goal- 4 The patient will demonstrate primarily diaphragmatic movement with breath support at rest and with phonation respectively with 80% accuracy during structured tasks in therapy and via self report with home program  -MC    Status: Other Adult Goal- 4 Progressing as  "expected  -MC    Comments: Other Adult Goal- 4 ***  -MC    Other Adult Goal- 5 The patient will identify \"bumpy\" vs \"smooth\" voicing (glottal mendoza/pitch breaks vs more balanced optimal voicing) with biofeedback on the visipitch during therapy on 80% of opportunities.  -MC    Status: Other Adult Goal- 5 Progressing as expected  -MC    Comments: Other Adult Goal- 5 ***  -MC    Other Adult Goal- 6 Complete Stemple's vocal function exercises to optimize coordination of respiration and phonation, and ease of vocal onset.  -MC    Status: Other Adult Goal- 6 Progressing as expected  -MC    Comments: Other Adult Goal- 6 ***  -MC              User Key  (r) = Recorded By, (t) = Taken By, (c) = Cosigned By      Initials Name Provider Type    Hedy Chaney, SLP Speech and Language Pathologist                         Time Calculation:                       EDY Khan  11/16/2023  "

## 2023-11-28 ENCOUNTER — HOSPITAL ENCOUNTER (OUTPATIENT)
Dept: SPEECH THERAPY | Facility: HOSPITAL | Age: 68
Discharge: HOME | End: 2023-11-28
Admitting: OTOLARYNGOLOGY
Payer: MEDICARE

## 2023-11-28 PROCEDURE — 92507 TX SP LANG VOICE COMM INDIV: CPT

## 2023-11-28 NOTE — THERAPY TREATMENT NOTE
Outpatient Speech Language Pathology   Adult/Peds Voice Progress Note  [facility]     Patient Name: Nehemias Strong Jr.  : 1955  MRN: [id number]  Today's Date: 2023        Visit Date: 2023      Patient Active Problem List   Diagnosis    Vitamin D deficiency    Migraine headache    Kidney stones    Idiopathic cyst of iris, ciliary body, or anterior chamber    Hyperlipidemia    Gastroesophageal reflux disease    Enlarged prostate    Bradycardia    Cardiac arrhythmia    Allergic rhinitis    Benign neoplasm of skin of eyelid    Family history of lung cancer    Other specified diseases of anus and rectum    Odynophagia    Plantar fasciitis    Rectal prolapse    Annual physical exam    Thyroid nodule    Primary hypertension    Lipoma    Obstructive lung disease (generalized)    Clinical diagnosis of severe acute respiratory syndrome coronavirus 2 (SARS-CoV-2) disease    Encounter for [employer] ([employer]) examination    Dvrtclos of lg int w/o perforation or abscess w/o bleeding    Coronary artery calcification    Dysphagia       Visit Dx:  Hoarseness/dysphonia           SLP OP Goals       Row Name 23 1300       Goal Type Needed    Goal Type Needed  Other Adult Goals  -MC       Other Goals    Other Adult Goal- 1 Patient will be able to use voice in therapy, vocational, and avocational activities without functional limitations due to hoarseness as measured by improved scores on the Voice Handicap Index and via acoustic and perceptual evaluations. Patient will be able to use voice in therapy, vocational, and avocational activities without functional limitations due to hoarseness as measured by improved scores on the Voice Handicap Index and via acoustic and perceptual evaluations.  -MC    Status: Other Adult Goal- 1 Progressing as expected  Progressing as expected  -MC    Comments: Other Adult Goal- 1 Pt arrived on time for therapy and demonstrated excellent  "motivation/participation. Subjectively, pt reports feeling somewhat defeated due to his reports of worsened voice over the past 2 days. He endorsed excellent voice improvement until singing at [employer] in which he feels he \"stressed out\" his voice/over projected/strained. He also reports significant increase in phlegm (?PND?) and throat clearing since Sunday.    Pt reports he just came from PCP who was unhappy about continued uncontrolled reflux. He has an EGD scheduled next month. The patient reports continued compliance with reflux precautions including but not limited to abstaining from eating at least 2 hours before bed. Pt arrived on time for therapy and demonstrated excellent motivation/participation. Progress note/re-evaluation conducted with Visipitch assessment to compare objective measurements/progress from initial evaluation. Pt reports he recently followed up with referring ENT, Dr. De La Rosa who has ordered a CT \"from head to lungs.\" Unable to review ENT progress notes/records in this system. Pt reports rationale for per ENT was \"because I'm not getting any better.\"       -MC    Other Adult Goal- 2 Pt will demonstrate an understanding of the structures and functions of the phonatory/respiratory tract (respiration, phonation, resonance and articulation)  Pt will demonstrate an understanding of the structures and functions of the phonatory/respiratory tract (respiration, phonation, resonance and articulation)  -MC    Status: Other Adult Goal- 2 Goal achieved 11-16 Goal achieved 11-16 -MC    Other Adult Goal- 3 The patient will use easy onset/frontal focus exercises to decrease laryngeal tension at the phrase/sentence level in an effort to decrease instance of glottal mendoza/laryngeal tension  The patient will use easy onset/frontal focus exercises to decrease laryngeal tension at the phrase/sentence level in an effort to decrease instance of glottal mendoza/laryngeal tension  -MC    Status: Other Adult Goal- 3 " "Progressing as expected Goal achieved  -    Comments: Other Adult Goal- 3 Pt expressed understanding/awareness of pitch breaks and reports self awareness of increased tension this date. He is able to demonstrate easy onset/\"inverse/reverse\" megaphone with cues Pt with single pitch break only this date- He continues to express understanding/awareness and spontaneously demonstrated easy onset/frontal focus during session without visible extrinsic laryngeal musculature tension. He expressed his awareness of implementing \"reverse megaphone\" vocal tract independently             -    Other Adult Goal- 4 The patient will demonstrate primarily diaphragmatic movement with breath support at rest and with phonation respectively with 80% accuracy during structured tasks in therapy and via self report with home program   The patient will demonstrate primarily diaphragmatic movement with breath support at rest and with phonation respectively with 80% accuracy during structured tasks in therapy and via self report with home program  -    Status: Other Adult Goal- 4 Progressing as expected Goal achieved  -    Comments: Other Adult Goal- 4 Pt reported increased tension that date, however he continued to demonstrate visible diaphragmatic movement with visible shoulder movement on 3/10 opportunities  Pt demonstrated visible diaphragmatic movement in isolation this date without any visible shoulder/clavicular/reverse thoracic movement on 100% of opportunities    Other Adult Goal- 5 The patient will identify \"bumpy\" vs \"smooth\" voicing (glottal mendoza/pitch breaks vs more balanced optimal voicing) with biofeedback on the visipitch during therapy on 80% of opportunities.  The patient will identify \"bumpy\" vs \"smooth\" voicing (glottal mendoza/pitch breaks vs more balanced optimal voicing) with biofeedback on the visipitch during therapy on 80% of opportunities.  -MC    Status: Other Adult Goal- 5 Goal met- 100% of opportunities Goal " "met- identified on single instance in which \"bumpy\" or hoarse pitch noted on visipitch.  -    Other Adult Goal- 6 Complete Stemple's vocal function exercises to optimize coordination of respiration and phonation, and ease of vocal onset.  Complete Stemple's vocal function exercises to optimize coordination of respiration and phonation, and ease of vocal onset.  -    Status: Other Adult Goal- 6 Progressing as expected-    Pt continues to with excellent compliance and is maing good progress with VFE program. He provided VFE logs and completed stemples VFE x2  in therapy this date with progress as follows:    Week of 11/13-11/19   Lowest daily score: 11.7, highest 15.2        Good progress towards goal.  Did not complete VFE this date in therapy as pt had completed 2 reps this AM, ,and focused on re-evaluation for Visipitch measurements    Per VFE logs provided:  Week of 11/20: achieved goal of 16 seconds, max of 20.2       -    Comments: Other Adult Goal- 6   -              User Key  (r) = Recorded By, (t) = Taken By, (c) = Cosigned By      Initials Name Provider Type    Hedy Chaney, SLP Speech and Language Pathologist                  Re-evaluation measurements support significant improvement from initial evaluation/videostrobe. This includes significant decrease in s:z ratio from 2.86 at evaluation to 1.3 today. This indicates improvement in vocal function and coordination of resp/phonation and decrease in hyperfunctional use of voice mechanism.     Next therapy session is scheduled for 12/12 at 8:45AM. Plan is for repeat therapeutic/diagnostic videostroboscopy and assessment of discharge criteria as appropriate.               Hedy Escamilla, EDY  11/28/2023  "

## 2023-11-29 NOTE — TELEPHONE ENCOUNTER
Gave message to patient at 9:25am.  He voiced understanding.   Medications to hold prior to surgery  Morning of surgery hold multivitamins  24 hours prior to surgery hold lisinopril  5 days prior to surgery hold NSAIDs  2 weeks prior to surgery hold vitamin-E and herbals  Continue all other medications unless an alternative plan was advised with the surgeon and/or specialist.

## 2023-12-12 ENCOUNTER — HOSPITAL ENCOUNTER (OUTPATIENT)
Dept: SPEECH THERAPY | Facility: HOSPITAL | Age: 68
Discharge: HOME | End: 2023-12-12
Admitting: OTOLARYNGOLOGY
Payer: MEDICARE

## 2023-12-12 PROCEDURE — 92507 TX SP LANG VOICE COMM INDIV: CPT

## 2023-12-12 NOTE — THERAPY DISCHARGE NOTE
Outpatient Speech Language Pathology   Adult/Peds Voice Eval/Discharge  [facility]     Patient Name: Nehemias Strong Jr.  : 1955  MRN: [id number]  Today's Date: 2023        Visit Date: 2023   Patient Active Problem List   Diagnosis    Vitamin D deficiency    Migraine headache    Kidney stones    Idiopathic cyst of iris, ciliary body, or anterior chamber    Hyperlipidemia    Gastroesophageal reflux disease    Enlarged prostate    Bradycardia    Cardiac arrhythmia    Allergic rhinitis    Benign neoplasm of skin of eyelid    Family history of lung cancer    Other specified diseases of anus and rectum    Odynophagia    Plantar fasciitis    Rectal prolapse    Annual physical exam    Thyroid nodule    Primary hypertension    Lipoma    Obstructive lung disease (generalized)    Clinical diagnosis of severe acute respiratory syndrome coronavirus 2 (SARS-CoV-2) disease    Encounter for [employer] ([employer]) examination    Dvrtclos of lg 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    Cardiac arrhythmia 2019    COVID 2022    Gastroesophageal reflux disease 2019    History of kidney stones     Hyperlipidemia 2019    Hypertension 2017    Losartan    Kidney stone     follows w/ [facility]    Onychomycosis     follow w/ podiatry- Dr Stock    Rectal prolapse 2018    Sleep apnea     Cpap    Thyroid nodule     Dr PRATHER    Umbilical hernia 2022        Past Surgical History:   Procedure Laterality Date    BRONCHOSCOPY N/A 2023    Procedure: BRONCHOSCOPY with left lower lobe bronchioalveolar lavage;  Surgeon: Jasper Bobo MD;  Location: [facility];  Service: Pulmonary;  Laterality: N/A;    COLONOSCOPY      DENTAL PROCEDURE      FINGER SURGERY  2022    cyst removal    HERNIA REPAIR  Right     inguinal w/ Lilia    MASS EXCISION N/A 01/12/2021    Procedure: lipoma excisions to abdomen, bilateral upper lateral thighs and bialteral arms;  Surgeon: Williams Stafford DO;  Location: [facility] MAIN OR;  Service: General;  Laterality: N/A;    THYROID SURGERY      UMBILICAL HERNIA REPAIR N/A 09/30/2022    Procedure: UMBILICAL HERNIA REPAIR with mesh;  Surgeon: Robe Torres MD;  Location: [facility] MAIN OR;  Service: General;  Laterality: N/A;    US GUIDED FINE NEEDLE ASPIRATION      Dr PRATHER       Discharge statement:    Pt has made significant improvements in overall phonation quality and stamina over the course of 6 therapy sessions following initial evaluation/consultation. Progress is supported by both objective measurements obtained on visipitch, reported achievements on vocal function exercise logs, and under direct visualization of vocal fold function upon repeat videostroboscopy this date. Please see comparison photos below from original videostrobe images on 10-17-23 vs today, 12-12-23.  There is decreased edema and erythema throughout. No obvious subepiglottic bulging with erythema seen this date. All Visipitch measurements re-evaluated previous therapy session with all found to be WNL.     This patient has been a pleasure to work with, and has demonstrated excellent carryover and understanding. Please feel free to reach out with any questions regarding his care, evaluation or discharge.       Phonation on 10-17         Phonation on 12-12        Mucus banding 10-17        12-12     SLP OP Goals       Row Name 12/12/23 0900       Goal Type Needed    Goal Type Needed Other Adult Goals  -MC       Other Goals    Other Adult Goal- 1 Patient will be able to use voice in therapy, vocational, and avocational activities without functional limitations due to hoarseness as measured by improved scores on the Voice Handicap Index and via acoustic and perceptual evaluations.  -MC    Status: Other Adult  Goal- 1 Achieved  -    Comments: Other Adult Goal- 1 Pt had CT yesterday at [facility]- no results yet.   Pt feels his voice “sounds better”   Has had 1 week with some post nasal drip.    Telehealth f/u with Dr PRATHER this Friday re: CT results  -Home program to include completion of vocal function program (~3 more weeks remaining). Continue hard swallow/throat clear for mucous/post nasal drip.     Reviewed therapeutic videostroboscopy and explained improvements via comparison to initial strobe on October 17th. Profound decrease in tension with adduction of true vocal folds without compression of ventricular tissue.  Phonation is audibly improved on stroboscopy with severe glottal mendoza upon initial exam to smooth phonation on today's repeat.     RSI score this date was a 17. This is down significantly as compared to 10-17 with a score of 30.     REFLUX SYMPTOM INDEX 12/12, with previous scores in parentheses next to current  Pt completed the Reflux Symptom Index (RSI). RSI is self rated on a scale of 0-5 (0= no problem and 5= severe problem)    Results as follows:  Hoarseness of a problem with your voice: 4 (5)  Clearing your throat: 3 (5)  Excess throat mucus or postnasal drip 2 (3)  Difficulty swallowing food, liquids, or pills 3 (3)  Coughing after you are or after lying down 1 (3)  Breathing difficulties or choking episodes 0 (3)  Troublesome or annoying cough 1 (3)  Sensations of something sticking in your throat 2 (3)  Heartburn, chest pain, indigestion, or stomach acid coming up 1 (2)    Total: 17      Normative data suggests that an RSI total of greater than or equal to 13 is clinically significant and may indicate uncontrolled reflux disease    VHI score went down 20 points from 95 to 75. This continues to demonstrate a severe vocal impact however there is a phenomenon of increase in VHI after voice intervention due to inherent increase in awareness of vocal structures/functions.        -    Other Adult Goal- 2  ".Pt will demonstrate an understanding of the structures and functions of the phonatory/respiratory tract (respiration, phonation, resonance and articulation)  -MC    Status: Other Adult Goal- 2 Achieved  -MC    Comments: Other Adult Goal- 2 11-16 -MC    Other Adult Goal- 3 The patient will use easy onset/frontal focus exercises to decrease laryngeal tension at the phrase/sentence level in an effort to decrease instance of glottal mendoza/laryngeal tension  -MC    Status: Other Adult Goal- 3 Progressing as expected;Achieved  -MC    Comments: Other Adult Goal- 3 Tense onset x1 only, seen on videostrobe with cued phonation after cued rest for assessment of abduction.This was brief, and the patient was able to sustain properly adducted voicing without ventricular compression.    -    Other Adult Goal- 4 The patient will demonstrate primarily diaphragmatic movement with breath support at rest and with phonation respectively with 80% accuracy during structured tasks in therapy and via self report with home program  -MC    Status: Other Adult Goal- 4 Achieved  -MC    Comments: Other Adult Goal- 4 11-16  -MC    Other Adult Goal- 5 The patient will identify \"bumpy\" vs \"smooth\" voicing (glottal mendoza/pitch breaks vs more balanced optimal voicing) with biofeedback on the visipitch during therapy on 80% of opportunities.  -MC    Status: Other Adult Goal- 5 Achieved  -MC    Other Adult Goal- 6 Complete [facility]'s vocal function exercises to optimize coordination of respiration and phonation, and ease of vocal onset.  -MC    Status: Other Adult Goal- 6 Progressing as expected;Achieved  achieved max success in therapy- to continue VFE at home through 1/2/24 or 1-16 (8-10 weeks)  -MC    Comments: Other Adult Goal- 6 VFE log reveals an average daily score of ~17.5 with pt maxing out at 26.65 on 12/4. The patient reports this was during a visit with his grandchildren in which he was using his voice more than average including singing. " This further supports the restoration of the respiration/phonation/resonance system and the patient's ability to carryover skills learned in therapy.          -              User Key  (r) = Recorded By, (t) = Taken By, (c) = Cosigned By      Initials Name Provider Type    Hedy Chaney, SLP Speech and Language Pathologist                       OP SLP Discharge Summary  Reason for Discharge: all goals and outcomes met, no further needs identified. See above discharge statement.        EDY Khan  12/12/2023

## 2023-12-13 DIAGNOSIS — E04.1 THYROID NODULE: Primary | ICD-10-CM

## 2023-12-22 ENCOUNTER — ON CAMPUS - OUTPATIENT (AMBULATORY)
Dept: URBAN - METROPOLITAN AREA HOSPITAL 2 | Facility: HOSPITAL | Age: 68
End: 2023-12-22

## 2023-12-22 VITALS
DIASTOLIC BLOOD PRESSURE: 74 MMHG | OXYGEN SATURATION: 94 % | HEIGHT: 73 IN | WEIGHT: 219 LBS | RESPIRATION RATE: 16 BRPM | HEART RATE: 59 BPM | SYSTOLIC BLOOD PRESSURE: 116 MMHG | DIASTOLIC BLOOD PRESSURE: 94 MMHG | HEART RATE: 66 BPM | DIASTOLIC BLOOD PRESSURE: 67 MMHG | SYSTOLIC BLOOD PRESSURE: 112 MMHG | OXYGEN SATURATION: 99 % | SYSTOLIC BLOOD PRESSURE: 142 MMHG | HEART RATE: 57 BPM | HEART RATE: 65 BPM | DIASTOLIC BLOOD PRESSURE: 86 MMHG | TEMPERATURE: 98.2 F | HEART RATE: 58 BPM | HEART RATE: 63 BPM | SYSTOLIC BLOOD PRESSURE: 138 MMHG | DIASTOLIC BLOOD PRESSURE: 72 MMHG | RESPIRATION RATE: 19 BRPM | OXYGEN SATURATION: 95 % | SYSTOLIC BLOOD PRESSURE: 137 MMHG | OXYGEN SATURATION: 98 % | DIASTOLIC BLOOD PRESSURE: 71 MMHG | SYSTOLIC BLOOD PRESSURE: 123 MMHG

## 2023-12-22 DIAGNOSIS — R13.10 DYSPHAGIA, UNSPECIFIED: ICD-10-CM

## 2023-12-22 DIAGNOSIS — K21.9 GASTRO-ESOPHAGEAL REFLUX DISEASE WITHOUT ESOPHAGITIS: ICD-10-CM

## 2023-12-22 PROCEDURE — 43235 EGD DIAGNOSTIC BRUSH WASH: CPT | Performed by: INTERNAL MEDICINE

## 2023-12-22 PROCEDURE — 43450 DILATE ESOPHAGUS 1/MULT PASS: CPT | Performed by: INTERNAL MEDICINE

## 2023-12-22 RX ORDER — BACLOFEN 10 MG/1
10 TABLET ORAL
Qty: 30 | Refills: 11 | Status: COMPLETED
Start: 2023-12-22 | End: 2024-02-22

## 2023-12-22 NOTE — SERVICENOTES
TRIAL OF BACLOFEN FOR POSSIBLE REGURGITATION. INCREASE DOSE AS TOLERATED. ALSO CONSIDER TRIAL OF TCA IF NO RESPONSE.

## 2023-12-28 ENCOUNTER — HOSPITAL ENCOUNTER (OUTPATIENT)
Facility: HOSPITAL | Age: 68
Discharge: HOME | End: 2023-12-28
Attending: EMERGENCY MEDICINE
Payer: MEDICARE

## 2023-12-28 VITALS
WEIGHT: 216 LBS | SYSTOLIC BLOOD PRESSURE: 135 MMHG | TEMPERATURE: 98.3 F | DIASTOLIC BLOOD PRESSURE: 82 MMHG | HEIGHT: 73 IN | BODY MASS INDEX: 28.63 KG/M2 | HEART RATE: 61 BPM | RESPIRATION RATE: 16 BRPM | OXYGEN SATURATION: 98 %

## 2023-12-28 DIAGNOSIS — L03.116 CELLULITIS OF LEFT LOWER LEG: Primary | ICD-10-CM

## 2023-12-28 PROCEDURE — G0463 HOSPITAL OUTPT CLINIC VISIT: HCPCS

## 2023-12-28 RX ORDER — CEPHALEXIN 500 MG/1
500 CAPSULE ORAL 4 TIMES DAILY
Qty: 28 CAPSULE | Refills: 0 | Status: SHIPPED | OUTPATIENT
Start: 2023-12-28 | End: 2024-01-04

## 2023-12-28 NOTE — DISCHARGE INSTRUCTIONS
Thank you for letting us care for you today.  Take the prescribed antibiotic medicine you are given as directed until it is gone. Take it even if you feel better. It treats the infection and stops it from returning. Not taking all the medicine can make future infections hard to treat.  Use the Bactroban 3 times a day until healed.  Rest and elevate your leg over the next couple of days.  Please follow-up with your primary care provider for continued evaluation and a wound recheck.  Return to the emergency room for any increased pain, swelling, redness, drainage or any other concerning symptoms.

## 2023-12-28 NOTE — FSED PROVIDER NOTE
[facility]    EMERGENCY DEPARTMENT ENCOUNTER    Room Number:  HALA/A  Date seen:  12/28/2023  Time seen: 14:45 EST  PCP: Jona Vieira MD  Historian: Patient    HPI:  Chief complaint: Wound check  Context:Nehemias Strong Jr. is a 68 y.o. male who presents to the ED with c/o wound check.  Patient reports that he tripped on a bucket several days ago and injured his left lower leg.  He reports that it was healing well and had scabbed over.  He reports over the last 24 hours he started noticed some redness around the healing wound.  He reports that he was concerned that he may have cellulitis and wanted to have it checked out.  Patient denies any numbness or tingling.  The patient is nontoxic in appearance.      Timing: Constant  Duration: 24 hours  Location: Left lower leg  Radiation: Nonradiating  Quality: Erythema  Intensity/Severity: Mild  Associated Symptoms: Wound check  Aggravating Factors: No known aggravating      MEDICAL RECORD REVIEW  Hyperlipidemia, GERD, hypertension, sleep apnea    ALLERGIES  Patient has no known allergies.    PAST MEDICAL HISTORY  Active Ambulatory Problems     Diagnosis Date Noted    Vitamin D deficiency 11/03/2017    Migraine headache 08/16/2019    Kidney stones 08/16/2019    Idiopathic cyst of iris, ciliary body, or anterior chamber 07/15/2015    Hyperlipidemia 08/16/2019    Gastroesophageal reflux disease 01/14/2019    Enlarged prostate 01/02/2018    Bradycardia 08/16/2019    Cardiac arrhythmia 08/16/2019    Allergic rhinitis 01/16/2012    Benign neoplasm of skin of eyelid 03/07/2014    Family history of lung cancer 01/21/2020    Other specified diseases of anus and rectum 03/09/2017    Odynophagia 05/24/2018    Plantar fasciitis 01/21/2020    Rectal prolapse 05/01/2018    Annual physical exam 01/05/2018    Thyroid nodule 04/21/2020    Primary hypertension 10/23/2020    Lipoma 12/21/2020    Obstructive lung disease (generalized)  04/25/2022    Clinical diagnosis of severe acute respiratory syndrome coronavirus 2 (SARS-CoV-2) disease 05/11/2022    Encounter for Federal Aviation Administration (FAA) examination 05/15/2023    Dvrtclos of lg int w/o perforation or abscess w/o bleeding 04/12/2011    Coronary artery calcification 07/31/2023    Dysphagia 11/16/2023     Resolved Ambulatory Problems     Diagnosis Date Noted    Sinus bradycardia 08/23/2013    Low back pain 10/17/2017    Benign lipomatous neoplasm of skin and subcutaneous tissue 10/16/2012    Left lower lobe pneumonia 08/29/2019    Encounter for physical examination related to employment 12/03/2019    Exposure to sexually transmitted disease (STD) 04/06/2012    Dermatitis 08/28/2015    Photodermatitis 08/12/2018    Hematuria 01/02/2018    High risk sexual behavior 05/24/2018    Multiple injuries 09/12/2014    Need for other prophylactic vaccination and inoculation against single diseases 05/01/2018    Overweight (BMI 25.0-29.9) 01/21/2020    Pain in extremity 10/11/2018    Penile lesion 05/24/2018    Underweight 01/21/2020    Rhinosinusitis 12/04/2013    Nevus 11/14/2012    Encounter for general adult medical examination without abnormal findings 03/13/2013    Screening for colon cancer 04/10/2013    Screening for prostate cancer 09/29/2017    Encounter for Federal Aviation Administration (FAA) examination 06/09/2020    Lipoma of torso 12/21/2020    Umbilical hernia without obstruction and without gangrene 09/12/2022     Past Medical History:   Diagnosis Date    COVID 05/2022    History of kidney stones     Hypertension 2017    Kidney stone     Onychomycosis     Sleep apnea     Umbilical hernia 09/2022       PAST SURGICAL HISTORY  Past Surgical History:   Procedure Laterality Date    BRONCHOSCOPY N/A 9/21/2023    Procedure: BRONCHOSCOPY with left lower lobe bronchioalveolar lavage;  Surgeon: Jasper Bobo MD;  Location: [facility] ENDOSCOPY;  Service: Pulmonary;  Laterality: N/A;     COLONOSCOPY      DENTAL PROCEDURE      FINGER SURGERY  2022    cyst removal    HERNIA REPAIR Right     inguinal w/ Lilia    MASS EXCISION N/A 2021    Procedure: lipoma excisions to abdomen, bilateral upper lateral thighs and bialteral arms;  Surgeon: Williams Stafford DO;  Location: [facility] MAIN OR;  Service: General;  Laterality: N/A;    THYROID SURGERY      UMBILICAL HERNIA REPAIR N/A 2022    Procedure: UMBILICAL HERNIA REPAIR with mesh;  Surgeon: Robe Torres MD;  Location: [facility] MAIN OR;  Service: General;  Laterality: N/A;    US GUIDED FINE NEEDLE ASPIRATION      Dr PRATHER       FAMILY HISTORY  Family History   Problem Relation Age of Onset    Lung cancer Mother         large cell    Cancer Mother         Large cell melanoma from lung cancer, smoking related. , 3/26/94    No Known Problems Father     Birth defects Brother         hole between left and right ventricles at birth.  at age 6 in , 3 days after experimental surgery.    No Known Problems Maternal Grandmother     No Known Problems Maternal Grandfather     No Known Problems Paternal Grandmother     No Known Problems Paternal Grandfather     No Known Problems Sister        SOCIAL HISTORY  Social History     Socioeconomic History    Marital status:     Number of children: 7   Tobacco Use    Smoking status: Never     Passive exposure: Never    Smokeless tobacco: Never   Vaping Use    Vaping Use: Never used   Substance and Sexual Activity    Alcohol use: No    Drug use: Never    Sexual activity: Yes     Partners: Female     Birth control/protection: Condom       REVIEW OF SYSTEMS  Review of Systems    All systems reviewed and negative except for those discussed in HPI.     PHYSICAL EXAM    I have reviewed the triage vital signs and nursing notes.    ED Triage Vitals [23 1306]   Temp Heart Rate Resp BP SpO2   98.3 °F (36.8 °C) 61 16 135/82 98 %      Temp src Heart Rate Source Patient Position BP Location  FiO2 (%)   Oral Monitor Sitting Left arm --       Physical Exam  Constitutional:       Appearance: Normal appearance. He is not toxic-appearing.   HENT:      Mouth/Throat:      Mouth: Mucous membranes are moist.   Eyes:      Pupils: Pupils are equal, round, and reactive to light.   Cardiovascular:      Rate and Rhythm: Normal rate and regular rhythm.      Pulses: Normal pulses.      Heart sounds: Normal heart sounds.   Pulmonary:      Effort: Pulmonary effort is normal.      Breath sounds: Normal breath sounds.   Skin:     General: Skin is warm.      Findings: Erythema present.          Neurological:      General: No focal deficit present.      Mental Status: He is alert.   Psychiatric:         Mood and Affect: Mood normal.         Behavior: Behavior normal.         Vital signs and nursing notes reviewed.        LAB RESULTS  No results found for this or any previous visit (from the past 24 hour(s)).    Ordered the above labs and independently reviewed the results.      RADIOLOGY RESULTS  No Radiology Exams Resulted Within Past 24 Hours       I ordered the above noted radiological studies. Independently reviewed by me and discussed with radiologist.  See dictation above for official radiology interpretation.      Orders placed during this visit:  No orders of the defined types were placed in this encounter.          PROCEDURES    Procedures        MEDICATIONS GIVEN IN ER    Medications - No data to display      PROGRESS, DATA ANALYSIS, CONSULTS, AND MEDICAL DECISION MAKING    All labs have been independently reviewed by me.  All radiology studies have been reviewed by me.   EKG's independently reviewed by me.  Discussion below represents my analysis of pertinent findings related to patient's condition, differential diagnosis, treatment plan and final disposition.    I rechecked the patient.  I discussed the patient's labs, radiology findings (including all incidental findings), diagnosis, and plan for discharge.  CODY  repeat exam reveals no new worrisome changes from my initial exam findings.  The patient understands that the fact that they are being discharged does not denote that nothing is abnormal, it indicates that no clinical emergency is present and that they must follow-up as directed in order to properly maintain their health.  Follow-up instructions (specifically listed below) and return to ER precautions were given at this time.  I specifically instructed the patient to follow-up with their PCP.  The patient understands and agrees with the plan, and is ready for discharge.  All questions answered.         AS OF 14:50 EST VITALS:    BP - 135/82  HR - 61  TEMP - 98.3 °F (36.8 °C) (Oral)  02 SATS - 98%    Medical Decision Making  MEDICAL DECISION  This patient presents with initial presentation of very minimal erythema concerning for beginning cellulitis. Sensitivity/pain to light touch around the erythematous area. No lymphangitic spread visible and no fluid pockets or fluctuance concerning for abscess noted. Low concern for osteomyelitis or DVT. No immune compromise, bullae, pain out of proportion, or rapid progression concerning for necrotizing fasciitis. Patient to be discharged home with keflex with follow up with their PMD.  I have also sent the patient home with a prescription for Bactroban.  Patient was given strict return precautions with understanding.    Problems Addressed:  Cellulitis of left lower leg: complicated acute illness or injury    Risk  Prescription drug management.          DIAGNOSIS  Final diagnoses:   Cellulitis of left lower leg       New Medications Ordered This Visit   Medications    cephalexin (KEFLEX) 500 MG capsule     Sig: Take 1 capsule by mouth 4 (Four) Times a Day for 7 days.     Dispense:  28 capsule     Refill:  0    mupirocin (BACTROBAN) 2 % ointment     Sig: Apply 1 application  topically to the appropriate area as directed 3 (Three) Times a Day.     Dispense:  30 g     Refill:  0            I performed hand hygiene on entry into the pt room and upon exit.     Note Disclaimer: At [facility], we believe that sharing information builds trust and better relationships. You are receiving this note because you recently visited [facility]. It is possible you will see health information before a provider has talked with you about it. This kind of information can be easy to misunderstand. To help you fully understand what it means for your health, we urge you to discuss this note with your provider.         Part of this note may be an electronic transcription/translation of spoken language to printed text using the Dragon Dictation System.     Appropriate PPE worn during exam.    Dictated utilizing Dragon dictation     Note Disclaimer: At [facility], we believe that sharing information builds trust and better relationships. You are receiving this note because you recently visited [facility]. It is possible you will see health information before a provider has talked with you about it. This kind of information can be easy to misunderstand. To help you fully understand what it means for your health, we urge you to discuss this note with your provider.

## 2024-01-03 ENCOUNTER — OFFICE VISIT (OUTPATIENT)
Dept: FAMILY MEDICINE CLINIC | Facility: CLINIC | Age: 69
End: 2024-01-03
Payer: MEDICARE

## 2024-01-03 VITALS
HEART RATE: 66 BPM | SYSTOLIC BLOOD PRESSURE: 110 MMHG | RESPIRATION RATE: 18 BRPM | WEIGHT: 220.2 LBS | HEIGHT: 73 IN | BODY MASS INDEX: 29.18 KG/M2 | OXYGEN SATURATION: 97 % | DIASTOLIC BLOOD PRESSURE: 74 MMHG

## 2024-01-03 DIAGNOSIS — L03.116 CELLULITIS OF LEFT LOWER EXTREMITY: Primary | ICD-10-CM

## 2024-01-03 PROCEDURE — 1160F RVW MEDS BY RX/DR IN RCRD: CPT | Performed by: NURSE PRACTITIONER

## 2024-01-03 PROCEDURE — 3074F SYST BP LT 130 MM HG: CPT | Performed by: NURSE PRACTITIONER

## 2024-01-03 PROCEDURE — 99213 OFFICE O/P EST LOW 20 MIN: CPT | Performed by: NURSE PRACTITIONER

## 2024-01-03 PROCEDURE — 3078F DIAST BP <80 MM HG: CPT | Performed by: NURSE PRACTITIONER

## 2024-01-03 PROCEDURE — 1159F MED LIST DOCD IN RCRD: CPT | Performed by: NURSE PRACTITIONER

## 2024-01-03 RX ORDER — BACLOFEN 10 MG/1
30 TABLET ORAL
COMMUNITY
Start: 2023-12-22

## 2024-01-03 RX ORDER — CEPHALEXIN 500 MG/1
500 CAPSULE ORAL 4 TIMES DAILY
Qty: 12 CAPSULE | Refills: 0 | Status: SHIPPED | OUTPATIENT
Start: 2024-01-03

## 2024-01-03 NOTE — PROGRESS NOTES
"Chief Complaint  Wound Check (Left shin. 2 weeks ago, tripped and scraped. Worried of infection //)  Subjective        Nehemias Strong . presents to [facility]  History of Present Illness  Pt comes in today for follow [place] [facility]. Went to [facility] on 12/28 with c/o redness and wound to left lower leg. Happened around 12/16 while playing [employer] ball with son. Tripped over bucket of balls and hit shin on corner of bucket.   Has had some redness and tenderness in that area since.   Went to [facility] and was prescribed keflex and bactroban.   He is very concerned that it isn't getting much better.   Wound Check    Rash  This is a new problem. The current episode started 1 to 4 weeks ago. The problem has been worsening since onset. The affected locations include the left leg. The rash is characterized by burning, pain and bruising. He was exposed to recent URI. Associated symptoms include congestion, coughing, fatigue, joint pain, rhinorrhea, shortness of breath and a sore throat. Pertinent negatives include no anorexia, diarrhea, facial edema, fever, nail changes or vomiting.     Review of Systems   Constitutional:  Positive for fatigue. Negative for fever.   HENT:  Positive for congestion, rhinorrhea and sore throat.    Respiratory:  Positive for cough and shortness of breath.    Gastrointestinal:  Negative for anorexia, diarrhea and vomiting.   Musculoskeletal:  Positive for joint pain.   Skin:  Positive for rash. Negative for nail changes.     Objective     Vital Signs:   /74 (BP Location: Left arm, Patient Position: Sitting, Cuff Size: Adult)   Pulse 66   Resp 18   Ht 185.4 cm (73\")   Wt 99.9 kg (220 lb 3.2 oz)   SpO2 97%   BMI 29.05 kg/m²       BP Readings from Last 3 Encounters:   01/03/24 110/74   12/28/23 135/82   11/16/23 122/76       Wt Readings from Last 3 Encounters:   01/03/24 99.9 kg (220 lb 3.2 oz)   12/28/23 98 kg (216 lb)   11/16/23 99.7 kg (219 lb 12.8 oz) "     Physical Exam  Constitutional:       Appearance: He is well-developed.   Eyes:      Pupils: Pupils are equal, round, and reactive to light.   Cardiovascular:      Rate and Rhythm: Normal rate and regular rhythm.   Pulmonary:      Effort: Pulmonary effort is normal.      Breath sounds: Normal breath sounds.   Skin:     Comments: See pic    Neurological:      Mental Status: He is alert and oriented to person, place, and time.        Result Review :                 Assessment and Plan    Diagnoses and all orders for this visit:    1. Cellulitis of left lower extremity (Primary)  -     cephalexin (Keflex) 500 MG capsule; Take 1 capsule by mouth 4 (Four) Times a Day.  Dispense: 12 capsule; Refill: 0    Finish anbx for 10 days  Keep clean  Monitor and follow up if no improvement  During this office visit, we discussed the pertinent aspects of the visit and treatment recommendations. Pt verbalizes understanding. Follow up was discussed. Patient was given the opportunity to ask questions and discuss other concerns.         Follow Up   Return if symptoms worsen or fail to improve.  Patient was given instructions and counseling regarding his condition or for health maintenance advice. Please see specific information pulled into the AVS if appropriate.       Answers submitted by the patient for this visit:  Primary Reason for Visit (Submitted on 1/2/2024)  What is the primary reason for your visit?: Rash

## 2024-01-17 ENCOUNTER — OFFICE VISIT (OUTPATIENT)
Dept: FAMILY MEDICINE CLINIC | Facility: CLINIC | Age: 69
End: 2024-01-17
Payer: MEDICARE

## 2024-01-17 VITALS
HEIGHT: 73 IN | BODY MASS INDEX: 29.82 KG/M2 | HEART RATE: 59 BPM | SYSTOLIC BLOOD PRESSURE: 130 MMHG | DIASTOLIC BLOOD PRESSURE: 86 MMHG | RESPIRATION RATE: 16 BRPM | OXYGEN SATURATION: 97 % | WEIGHT: 225 LBS

## 2024-01-17 DIAGNOSIS — R05.3 CHRONIC COUGH: Primary | ICD-10-CM

## 2024-01-17 PROCEDURE — 99213 OFFICE O/P EST LOW 20 MIN: CPT | Performed by: FAMILY MEDICINE

## 2024-01-17 PROCEDURE — 3079F DIAST BP 80-89 MM HG: CPT | Performed by: FAMILY MEDICINE

## 2024-01-17 PROCEDURE — 1160F RVW MEDS BY RX/DR IN RCRD: CPT | Performed by: FAMILY MEDICINE

## 2024-01-17 PROCEDURE — 1159F MED LIST DOCD IN RCRD: CPT | Performed by: FAMILY MEDICINE

## 2024-01-17 PROCEDURE — 3075F SYST BP GE 130 - 139MM HG: CPT | Performed by: FAMILY MEDICINE

## 2024-01-17 NOTE — PROGRESS NOTES
Chief Complaint   Patient presents with    Cough    Cellulitis     Left Prince     HPI  Nehemias Strong Jr. is a 68 y.o. male that presents for   Chief Complaint   Patient presents with    Cough    Cellulitis     Left Shin     Chronic cough: patient recently completed speech therapy, which was very helpful. Hoarseness has improved markedly as well as cough. Underwent EGD 12/2023 and found to be unremarkable aside from some esophageal stenosis, which was dilated. Everette trialed a course of baclofen, which didn't seem to help. He continues on pantoprazole 40 BID but discontinued Advair 1.5 months ago as it didn't seem to make any difference.     Review of Systems   HENT:  Negative for voice change.    Respiratory:  Positive for cough. Negative for shortness of breath.      The following portions of the patient's history were reviewed and updated as appropriate: problem list, past medical history, past surgical history, allergies, current medication    Problem List Tab  Patient History Tab  Immunizations Tab  Medications Tab  Chart Review Tab  Care Everywhere Tab  Synopsis Tab    PE  Vitals:    01/17/24 1409   BP: 130/86   Pulse: 59   Resp: 16   SpO2: 97%     Body mass index is 29.69 kg/m².  General: Well nourished, NAD  Head: AT/NC  Eyes: EOMI, anicteric sclera  Resp: CTAB, SCR, BS equal  CV: RRR w/o m/r/g; 2+ pulses  GI: Soft, NT/ND, +BS  MSK: FROM, no deformity, trace LLE edema  Skin: Warm, dry, intact  Neuro: Alert and oriented. No focal deficits  Psych: Appropriate mood and affect    Imaging  No Images in the past 120 days found..    Assessment & Plan   Nehemias Strong Jr. is a 68 y.o. male that presents for   Chief Complaint   Patient presents with    Cough    Cellulitis     Left Prince     Diagnoses and all orders for this visit:    1. Chronic cough (Primary): markedly improved since last visit. EGD unrevealing. No medication ever seemed to help. Hoarseness resolved- ST beneficial   - Reduce pantoprazole 40mg  to daily    -- Consider stopping at GI f/u next month   - Already off Advair. Consider stopping Singulair at [facility] in 3 months       Return in about 3 months (around 4/17/2024) for Medicare Wellness.

## 2024-02-22 ENCOUNTER — OFFICE (AMBULATORY)
Dept: URBAN - METROPOLITAN AREA CLINIC 64 | Facility: CLINIC | Age: 69
End: 2024-02-22
Payer: OTHER GOVERNMENT

## 2024-02-22 VITALS
HEIGHT: 73 IN | SYSTOLIC BLOOD PRESSURE: 125 MMHG | WEIGHT: 228 LBS | DIASTOLIC BLOOD PRESSURE: 77 MMHG | HEART RATE: 58 BPM

## 2024-02-22 DIAGNOSIS — R13.10 DYSPHAGIA, UNSPECIFIED: ICD-10-CM

## 2024-02-22 PROCEDURE — 99212 OFFICE O/P EST SF 10 MIN: CPT | Performed by: NURSE PRACTITIONER

## 2024-03-28 ENCOUNTER — HOSPITAL ENCOUNTER (OUTPATIENT)
Facility: HOSPITAL | Age: 69
Discharge: HOME | End: 2024-03-28
Attending: EMERGENCY MEDICINE | Admitting: EMERGENCY MEDICINE
Payer: MEDICARE

## 2024-03-28 VITALS
WEIGHT: 221.3 LBS | DIASTOLIC BLOOD PRESSURE: 78 MMHG | HEART RATE: 60 BPM | OXYGEN SATURATION: 98 % | SYSTOLIC BLOOD PRESSURE: 132 MMHG | RESPIRATION RATE: 18 BRPM | HEIGHT: 73 IN | TEMPERATURE: 97.9 F | BODY MASS INDEX: 29.33 KG/M2

## 2024-03-28 DIAGNOSIS — J06.9 UPPER RESPIRATORY TRACT INFECTION, UNSPECIFIED TYPE: ICD-10-CM

## 2024-03-28 DIAGNOSIS — H00.012 HORDEOLUM EXTERNUM OF RIGHT LOWER EYELID: Primary | ICD-10-CM

## 2024-03-28 LAB
FLUAV SUBTYP SPEC NAA+PROBE: NOT DETECTED
FLUBV RNA ISLT QL NAA+PROBE: NOT DETECTED
SARS-COV-2 RNA RESP QL NAA+PROBE: NOT DETECTED
STREP A PCR: NOT DETECTED

## 2024-03-28 PROCEDURE — 87651 STREP A DNA AMP PROBE: CPT | Performed by: EMERGENCY MEDICINE

## 2024-03-28 PROCEDURE — 99213 OFFICE O/P EST LOW 20 MIN: CPT | Performed by: EMERGENCY MEDICINE

## 2024-03-28 PROCEDURE — G0463 HOSPITAL OUTPT CLINIC VISIT: HCPCS | Performed by: EMERGENCY MEDICINE

## 2024-03-28 PROCEDURE — 87636 SARSCOV2 & INF A&B AMP PRB: CPT | Performed by: EMERGENCY MEDICINE

## 2024-03-28 RX ORDER — ERYTHROMYCIN 5 MG/G
OINTMENT OPHTHALMIC
Qty: 1 G | Refills: 0 | Status: SHIPPED | OUTPATIENT
Start: 2024-03-28 | End: 2024-04-04

## 2024-03-28 RX ORDER — GUAIFENESIN 200 MG/1
400 TABLET ORAL EVERY 8 HOURS PRN
Qty: 18 TABLET | Refills: 0 | Status: SHIPPED | OUTPATIENT
Start: 2024-03-28 | End: 2024-03-31

## 2024-03-28 NOTE — FSED PROVIDER NOTE
Subjective   History of Present Illness  68-year-old  male presents emerged department for cough, congestion, sore throat, right eyelid discomfort/complaint.  Patient reports symptoms began approximately 2 to 3 days prior.  No chest pain or shortness of breath.  No headache, neck pain, photophobia.  Patient was also playing with his grandson and was scratched in the eye and sustained 2 bumps to his right lower eyelid.  No nausea, vomiting, diarrhea, abdominal pain.  No urinary symptoms.    Review of Systems   All other systems reviewed and are negative.      Past Medical History:   Diagnosis Date    Bradycardia 08/16/2019    Followed by cardiologist with holter monitor every 6 months.    Cardiac arrhythmia 08/16/2019    COVID 05/2022    Gastroesophageal reflux disease 01/14/2019    History of kidney stones     Hyperlipidemia 08/16/2019    Hypertension 2017    Losartan    Kidney stone     follows w/ [facility]    Onychomycosis     follow w/ podiatry- Dr Stock    Rectal prolapse 05/01/2018    Sleep apnea     Cpap    Thyroid nodule     Dr PRATHER    Umbilical hernia 09/2022       No Known Allergies    Past Surgical History:   Procedure Laterality Date    BRONCHOSCOPY N/A 9/21/2023    Procedure: BRONCHOSCOPY with left lower lobe bronchioalveolar lavage;  Surgeon: Jasper Bobo MD;  Location: [facility] ENDOSCOPY;  Service: Pulmonary;  Laterality: N/A;    COLONOSCOPY      DENTAL PROCEDURE      FINGER SURGERY  03/2022    cyst removal    HERNIA REPAIR Right     inguinal w/ Lilia    MASS EXCISION N/A 01/12/2021    Procedure: lipoma excisions to abdomen, bilateral upper lateral thighs and bialteral arms;  Surgeon: Williams Stafford DO;  Location: [facility] MAIN OR;  Service: General;  Laterality: N/A;    THYROID SURGERY      UMBILICAL HERNIA REPAIR N/A 09/30/2022    Procedure: UMBILICAL HERNIA REPAIR with mesh;  Surgeon: Robe Torres MD;  Location: [facility] MAIN OR;  Service: General;  Laterality: N/A;    US GUIDED FINE  NEEDLE ASPIRATION      Dr PRATHER       Family History   Problem Relation Age of Onset    Lung cancer Mother         large cell    Cancer Mother         Large cell melanoma from lung cancer, smoking related. , 3/26/94    No Known Problems Father     Birth defects Brother         hole between left and right ventricles at birth.  at age 6 in , 3 days after experimental surgery.    No Known Problems Maternal Grandmother     No Known Problems Maternal Grandfather     No Known Problems Paternal Grandmother     No Known Problems Paternal Grandfather     No Known Problems Sister        Social History     Socioeconomic History    Marital status:     Number of children: 7   Tobacco Use    Smoking status: Never     Passive exposure: Never    Smokeless tobacco: Never   Vaping Use    Vaping status: Never Used   Substance and Sexual Activity    Alcohol use: No    Drug use: Never    Sexual activity: Yes     Partners: Female     Birth control/protection: Condom           Objective   Physical Exam  Vitals and nursing note reviewed.   Constitutional:       General: He is not in acute distress.     Appearance: Normal appearance. He is normal weight.   HENT:      Head: Normocephalic and atraumatic.      Right Ear: Tympanic membrane, ear canal and external ear normal.      Left Ear: Tympanic membrane, ear canal and external ear normal.      Nose: Nose normal. Congestion present.      Mouth/Throat:      Mouth: Mucous membranes are moist.      Pharynx: Oropharynx is clear.   Eyes:      General: No scleral icterus.        Right eye: No discharge.         Left eye: No discharge.      Extraocular Movements: Extraocular movements intact.      Conjunctiva/sclera: Conjunctivae normal.      Pupils: Pupils are equal, round, and reactive to light.      Comments: 2 papules located on the right lower lid   Cardiovascular:      Rate and Rhythm: Normal rate.      Pulses: Normal pulses.   Pulmonary:      Effort: Pulmonary effort is  normal. No respiratory distress.      Breath sounds: No stridor. No wheezing, rhonchi or rales.   Chest:      Chest wall: No tenderness.   Abdominal:      General: Abdomen is flat.   Musculoskeletal:         General: Normal range of motion.      Cervical back: Normal range of motion. No rigidity.   Skin:     General: Skin is warm.      Capillary Refill: Capillary refill takes less than 2 seconds.      Findings: No rash.   Neurological:      General: No focal deficit present.      Mental Status: He is alert.         Procedures           ED Course                                           Medical Decision Making  URI symptoms, right eye complaint in a 68-year-old male with no past medical history.  On exam patient is nontoxic, afebrile has clear lungs, nasal congestion, 2 papules to the right lower lip consistent with possible hordeolum.  Will perform point-of-care testing.  No indication for labs or imaging.  Anticipate discharge on decongestion as well as antibiotics for possible hordeolum.    Problems Addressed:  Hordeolum externum of right lower eyelid: complicated acute illness or injury  Upper respiratory tract infection, unspecified type: complicated acute illness or injury    Risk  OTC drugs.  Prescription drug management.        Final diagnoses:   Hordeolum externum of right lower eyelid   Upper respiratory tract infection, unspecified type       ED Disposition  ED Disposition       ED Disposition   Discharge    Condition   Stable    Comment   --               Jona Vieira MD  [address] [place] [id number] [phone]    Schedule an appointment as soon as possible for a visit       [place] [address] [place] [id number] [phone]    If symptoms worsen         Medication List        New Prescriptions      erythromycin 5 MG/GM ophthalmic ointment  Commonly known as: ROMYCIN  Administer  to the right eye Every 4 (Four)  Hours While Awake for 7 days.     guaiFENesin 200 MG tablet  Take 2 tablets by mouth Every 8 (Eight) Hours As Needed for Cough for up to 3 days.               Where to Get Your Medications        These medications were sent to [id number] - [facility] [place]  [address] [address] AT [facility] & [place] - [phone]  - [phone]   [address] [place] [address] [place] [address]      Phone: [phone]   erythromycin 5 MG/GM ophthalmic ointment  guaiFENesin 200 MG tablet

## 2024-04-17 ENCOUNTER — HOSPITAL ENCOUNTER (EMERGENCY)
Facility: HOSPITAL | Age: 69
Discharge: HOME | End: 2024-04-17
Attending: EMERGENCY MEDICINE
Payer: MEDICARE

## 2024-04-17 ENCOUNTER — APPOINTMENT (OUTPATIENT)
Dept: CT IMAGING | Facility: HOSPITAL | Age: 69
End: 2024-04-17
Payer: MEDICARE

## 2024-04-17 VITALS
TEMPERATURE: 98.4 F | OXYGEN SATURATION: 97 % | RESPIRATION RATE: 18 BRPM | HEART RATE: 59 BPM | DIASTOLIC BLOOD PRESSURE: 77 MMHG | WEIGHT: 219.3 LBS | BODY MASS INDEX: 29.06 KG/M2 | SYSTOLIC BLOOD PRESSURE: 109 MMHG | HEIGHT: 73 IN

## 2024-04-17 DIAGNOSIS — R19.7 DIARRHEA, UNSPECIFIED TYPE: ICD-10-CM

## 2024-04-17 DIAGNOSIS — K57.90 DIVERTICULOSIS: Primary | ICD-10-CM

## 2024-04-17 DIAGNOSIS — R10.30 LOWER ABDOMINAL PAIN: ICD-10-CM

## 2024-04-17 LAB
ALBUMIN SERPL-MCNC: 4.3 G/DL (ref 3.5–5.2)
ALBUMIN/GLOB SERPL: 2 G/DL
ALP SERPL-CCNC: 68 U/L (ref 39–117)
ALT SERPL W P-5'-P-CCNC: 28 U/L (ref 1–41)
ANION GAP SERPL CALCULATED.3IONS-SCNC: 8.3 MMOL/L (ref 5–15)
AST SERPL-CCNC: 27 U/L (ref 1–40)
BASOPHILS # BLD AUTO: 0.01 10*3/MM3 (ref 0–0.2)
BASOPHILS NFR BLD AUTO: 0.2 % (ref 0–1.5)
BILIRUB SERPL-MCNC: 0.7 MG/DL (ref 0–1.2)
BILIRUB UR QL STRIP: NEGATIVE
BUN SERPL-MCNC: 18 MG/DL (ref 8–23)
BUN/CREAT SERPL: 14.3 (ref 7–25)
C TRACH RRNA CVX QL NAA+PROBE: NOT DETECTED
CALCIUM SPEC-SCNC: 8.6 MG/DL (ref 8.6–10.5)
CHLORIDE SERPL-SCNC: 104 MMOL/L (ref 98–107)
CLARITY UR: CLEAR
CO2 SERPL-SCNC: 24.7 MMOL/L (ref 22–29)
COLOR UR: YELLOW
CREAT SERPL-MCNC: 1.26 MG/DL (ref 0.76–1.27)
D-LACTATE SERPL-SCNC: 1 MMOL/L (ref 0.5–2)
DEPRECATED RDW RBC AUTO: 44.3 FL (ref 37–54)
EGFRCR SERPLBLD CKD-EPI 2021: 62.9 ML/MIN/1.73
EOSINOPHIL # BLD AUTO: 0.18 10*3/MM3 (ref 0–0.4)
EOSINOPHIL NFR BLD AUTO: 3.6 % (ref 0.3–6.2)
ERYTHROCYTE [DISTWIDTH] IN BLOOD BY AUTOMATED COUNT: 13.5 % (ref 12.3–15.4)
FLUAV SUBTYP SPEC NAA+PROBE: NOT DETECTED
FLUBV RNA ISLT QL NAA+PROBE: NOT DETECTED
GLOBULIN UR ELPH-MCNC: 2.2 GM/DL
GLUCOSE SERPL-MCNC: 113 MG/DL (ref 65–99)
GLUCOSE UR STRIP-MCNC: NEGATIVE MG/DL
HCT VFR BLD AUTO: 41.8 % (ref 37.5–51)
HGB BLD-MCNC: 13.5 G/DL (ref 13–17.7)
HGB UR QL STRIP.AUTO: NEGATIVE
IMM GRANULOCYTES # BLD AUTO: 0 10*3/MM3 (ref 0–0.05)
IMM GRANULOCYTES NFR BLD AUTO: 0 % (ref 0–0.5)
KETONES UR QL STRIP: ABNORMAL
LEUKOCYTE ESTERASE UR QL STRIP.AUTO: NEGATIVE
LIPASE SERPL-CCNC: 39 U/L (ref 13–60)
LYMPHOCYTES # BLD AUTO: 1.21 10*3/MM3 (ref 0.7–3.1)
LYMPHOCYTES NFR BLD AUTO: 24.2 % (ref 19.6–45.3)
MCH RBC QN AUTO: 28.5 PG (ref 26.6–33)
MCHC RBC AUTO-ENTMCNC: 32.3 G/DL (ref 31.5–35.7)
MCV RBC AUTO: 88.4 FL (ref 79–97)
MONOCYTES # BLD AUTO: 0.69 10*3/MM3 (ref 0.1–0.9)
MONOCYTES NFR BLD AUTO: 13.8 % (ref 5–12)
N GONORRHOEA RRNA SPEC QL NAA+PROBE: NOT DETECTED
NEUTROPHILS NFR BLD AUTO: 2.92 10*3/MM3 (ref 1.7–7)
NEUTROPHILS NFR BLD AUTO: 58.2 % (ref 42.7–76)
NITRITE UR QL STRIP: NEGATIVE
PH UR STRIP.AUTO: 5.5 [PH] (ref 5–8)
PLATELET # BLD AUTO: 173 10*3/MM3 (ref 140–450)
PMV BLD AUTO: 9.2 FL (ref 6–12)
POTASSIUM SERPL-SCNC: 4.1 MMOL/L (ref 3.5–5.2)
PROT SERPL-MCNC: 6.5 G/DL (ref 6–8.5)
PROT UR QL STRIP: ABNORMAL
RBC # BLD AUTO: 4.73 10*6/MM3 (ref 4.14–5.8)
SARS-COV-2 RNA RESP QL NAA+PROBE: NOT DETECTED
SODIUM SERPL-SCNC: 137 MMOL/L (ref 136–145)
SP GR UR STRIP: >=1.03 (ref 1–1.03)
UROBILINOGEN UR QL STRIP: ABNORMAL
WBC NRBC COR # BLD AUTO: 5.01 10*3/MM3 (ref 3.4–10.8)

## 2024-04-17 PROCEDURE — 83690 ASSAY OF LIPASE: CPT | Performed by: EMERGENCY MEDICINE

## 2024-04-17 PROCEDURE — 74177 CT ABD & PELVIS W/CONTRAST: CPT

## 2024-04-17 PROCEDURE — 85025 COMPLETE CBC W/AUTO DIFF WBC: CPT | Performed by: EMERGENCY MEDICINE

## 2024-04-17 PROCEDURE — 99284 EMERGENCY DEPT VISIT MOD MDM: CPT | Performed by: EMERGENCY MEDICINE

## 2024-04-17 PROCEDURE — 25810000003 SODIUM CHLORIDE 0.9 % SOLUTION: Performed by: EMERGENCY MEDICINE

## 2024-04-17 PROCEDURE — 87491 CHLMYD TRACH DNA AMP PROBE: CPT | Performed by: EMERGENCY MEDICINE

## 2024-04-17 PROCEDURE — 96360 HYDRATION IV INFUSION INIT: CPT

## 2024-04-17 PROCEDURE — 99285 EMERGENCY DEPT VISIT HI MDM: CPT

## 2024-04-17 PROCEDURE — 87636 SARSCOV2 & INF A&B AMP PRB: CPT | Performed by: EMERGENCY MEDICINE

## 2024-04-17 PROCEDURE — 80053 COMPREHEN METABOLIC PANEL: CPT | Performed by: EMERGENCY MEDICINE

## 2024-04-17 PROCEDURE — 87591 N.GONORRHOEAE DNA AMP PROB: CPT | Performed by: EMERGENCY MEDICINE

## 2024-04-17 PROCEDURE — 81003 URINALYSIS AUTO W/O SCOPE: CPT | Performed by: EMERGENCY MEDICINE

## 2024-04-17 PROCEDURE — 25510000001 IOPAMIDOL PER 1 ML: Performed by: EMERGENCY MEDICINE

## 2024-04-17 PROCEDURE — 83605 ASSAY OF LACTIC ACID: CPT | Performed by: EMERGENCY MEDICINE

## 2024-04-17 RX ORDER — AMOXICILLIN AND CLAVULANATE POTASSIUM 875; 125 MG/1; MG/1
1 TABLET, FILM COATED ORAL 2 TIMES DAILY
Qty: 20 TABLET | Refills: 0 | Status: SHIPPED | OUTPATIENT
Start: 2024-04-17 | End: 2024-04-24

## 2024-04-17 RX ADMIN — IOPAMIDOL 100 ML: 755 INJECTION, SOLUTION INTRAVENOUS at 10:52

## 2024-04-17 RX ADMIN — SODIUM CHLORIDE 1000 ML: 9 INJECTION, SOLUTION INTRAVENOUS at 09:38

## 2024-04-17 NOTE — FSED PROVIDER NOTE
Subjective   History of Present Illness  The patient is a 68-year-old  male with past medical history significant for GERD, hypertension and hyperlipidemia, who presents emergency room with complaints of lower abdominal pain and diarrhea.  Patient states that he has had increasing lower abdominal pain and diarrhea over the past 4 days.  Patient reports that 3 days ago, his diarrhea was constant.  He reports severe lower abdominal cramping when this was going on.  He had no blood in his stool.  He has since gotten a little bit better.  He reports continued lower abdominal discomfort with frequent stools.    He also reports that he is possibly concerned for STD although he admits that he had sexual intercourse that was protected.  He still wants to be checked out.  He has had no symptoms.        Review of Systems   Constitutional: Negative.  Negative for chills, fatigue and fever.   Eyes: Negative.    Respiratory:  Negative for cough, chest tightness and shortness of breath.    Cardiovascular:  Negative for chest pain and palpitations.   Gastrointestinal:  Positive for abdominal pain and diarrhea. Negative for nausea and vomiting.   Genitourinary: Negative.    Musculoskeletal: Negative.    Skin: Negative.  Negative for rash.   Neurological: Negative.  Negative for syncope, weakness, numbness and headaches.   Psychiatric/Behavioral: Negative.     All other systems reviewed and are negative.      Past Medical History:   Diagnosis Date    Bradycardia 08/16/2019    Followed by cardiologist with holter monitor every 6 months.    Cardiac arrhythmia 08/16/2019    COVID 05/2022    Gastroesophageal reflux disease 01/14/2019    History of kidney stones     Hyperlipidemia 08/16/2019    Hypertension 2017    Losartan    Kidney stone     follows w/ [facility]    Onychomycosis     follow w/ podiatry- Dr Stock    Rectal prolapse 05/01/2018    Sleep apnea     Cpap    Thyroid nodule     Dr PRATHER    Umbilical hernia 09/2022       No  Known Allergies    Past Surgical History:   Procedure Laterality Date    BRONCHOSCOPY N/A 2023    Procedure: BRONCHOSCOPY with left lower lobe bronchioalveolar lavage;  Surgeon: Jasper Bobo MD;  Location: [facility] ENDOSCOPY;  Service: Pulmonary;  Laterality: N/A;    COLONOSCOPY      DENTAL PROCEDURE      FINGER SURGERY  2022    cyst removal    HERNIA REPAIR Right     inguinal w/ Lilia    MASS EXCISION N/A 2021    Procedure: lipoma excisions to abdomen, bilateral upper lateral thighs and bialteral arms;  Surgeon: Williams Stafford DO;  Location: [facility] MAIN OR;  Service: General;  Laterality: N/A;    THYROID SURGERY      UMBILICAL HERNIA REPAIR N/A 2022    Procedure: UMBILICAL HERNIA REPAIR with mesh;  Surgeon: Robe Torres MD;  Location: [facility] MAIN OR;  Service: General;  Laterality: N/A;    US GUIDED FINE NEEDLE ASPIRATION      Dr PRATHER       Family History   Problem Relation Age of Onset    Lung cancer Mother         large cell    Cancer Mother         Large cell melanoma from lung cancer, smoking related. , 3/26/94    No Known Problems Father     Birth defects Brother         hole between left and right ventricles at birth.  at age 6 in , 3 days after experimental surgery.    No Known Problems Maternal Grandmother     No Known Problems Maternal Grandfather     No Known Problems Paternal Grandmother     No Known Problems Paternal Grandfather     No Known Problems Sister        Social History     Socioeconomic History    Marital status:     Number of children: 7   Tobacco Use    Smoking status: Never     Passive exposure: Never    Smokeless tobacco: Never   Vaping Use    Vaping status: Never Used   Substance and Sexual Activity    Alcohol use: No    Drug use: Never    Sexual activity: Yes     Partners: Female     Birth control/protection: Condom           Objective   Physical Exam  Vitals and nursing note reviewed.   Constitutional:       General: He is in  acute distress.      Appearance: He is not ill-appearing.   HENT:      Head: Normocephalic.      Right Ear: External ear normal.      Left Ear: External ear normal.      Nose: Nose normal.      Mouth/Throat:      Mouth: Mucous membranes are moist.   Eyes:      Extraocular Movements: Extraocular movements intact.   Cardiovascular:      Rate and Rhythm: Normal rate and regular rhythm.      Pulses: Normal pulses.   Pulmonary:      Effort: Pulmonary effort is normal. No respiratory distress.   Abdominal:      General: Abdomen is flat.      Tenderness: There is abdominal tenderness in the suprapubic area and left lower quadrant.   Musculoskeletal:         General: No swelling, tenderness, deformity or signs of injury. Normal range of motion.      Cervical back: No tenderness.   Skin:     General: Skin is warm.      Capillary Refill: Capillary refill takes less than 2 seconds.   Neurological:      General: No focal deficit present.      Mental Status: He is alert and oriented to person, place, and time. Mental status is at baseline.   Psychiatric:         Mood and Affect: Mood normal.         Procedures           ED Course  ED Course as of 04/17/24 1145   Wed Apr 17, 2024   0941 Negative COVID and flu. [KZ]   0956 Patient's white blood cell count is normal.  Patient has mild ketones in urine. [KZ]   1014 Patient's labs are completely normal. [KZ]   1126 CT scan shows diverticulosis with with other nonemergent findings. [KZ]      ED Course User Index  [KZ] Beau Bowman MD                                           Medical Decision Making  Patient presents to the emergency room with LEFT lower quadrant abdominal pain.  See HPI for exact details and associated symptoms.  Given his examination, diverticulitis is leading diagnosis.  Atypical appendicitis is in the differential.  Other diagnoses would include mesenteric adenitis, epiploic appendagitis, colitis, kidney stone, UTI and or abdominal pain of unknown etiology.  I  did give consideration to testicular torsion, but the patient's symptoms and duration of his pain do not match with that.  No testicular pain.  A work-up has been ordered which will include CT scan imaging and blood work.  Patient offered pain medication.     Patient's workup shows diverticulosis without diverticulitis.  Given his diarrhea and pain, we still will go ahead and treat.    Problems Addressed:  Diarrhea, unspecified type: complicated acute illness or injury  Diverticulosis: complicated acute illness or injury  Lower abdominal pain: complicated acute illness or injury    Amount and/or Complexity of Data Reviewed  Labs: ordered. Decision-making details documented in ED Course.  Radiology: ordered. Decision-making details documented in ED Course.    Risk  Prescription drug management.        Final diagnoses:   Diverticulosis   Lower abdominal pain   Diarrhea, unspecified type       ED Disposition  ED Disposition       ED Disposition   Discharge    Condition   Stable    Comment   --               Jona Vieira MD  [address] [facility] [address]  [facility] [address]  [place] [address] [phone]    Schedule an appointment as soon as possible for a visit   As needed, For repeat evaluation    Braden Hightower MD  [address] [facility] [place] [address] [phone]    Schedule an appointment as soon as possible for a visit   As needed         Medication List        New Prescriptions      amoxicillin-clavulanate 875-125 MG per tablet  Commonly known as: AUGMENTIN  Take 1 tablet by mouth 2 (Two) Times a Day for 10 days.               Where to Get Your Medications        These medications were sent to [id number] [place] - [address] [address] AT [facility] & [place] - [phone] [facility] [phone] [address] [place] [address]      Phone: [phone]   amoxicillin-clavulanate 875-125 MG per tablet

## 2024-04-17 NOTE — ED NOTES
Pt comes in today for diarrhea and abdominal cramping that started Tuesday morning. Pt states he has been eating a bland diet but has not taken anything for his diarrhea. Pt feels he is dehyrated.

## 2024-04-17 NOTE — Clinical Note
[facility] FSED [address] [place] [address] [place] [address]  Phone: [phone]    Nehemias Strong was seen and treated in our emergency department on 4/17/2024.  He may return to work on 04/18/2024.         Thank you for choosing [facility].    Beau Bowman MD

## 2024-04-17 NOTE — DISCHARGE INSTRUCTIONS
Take antibiotics as prescribed for your condition.  Return to the emergency room for any emergent concerns.  Follow-up with primary care physician and/or gastroenterology for repeat evaluation.

## 2024-04-22 DIAGNOSIS — I10 PRIMARY HYPERTENSION: ICD-10-CM

## 2024-04-22 RX ORDER — LOSARTAN POTASSIUM 25 MG/1
25 TABLET ORAL DAILY
Qty: 90 TABLET | Refills: 1 | Status: SHIPPED | OUTPATIENT
Start: 2024-04-22

## 2024-04-24 ENCOUNTER — OFFICE VISIT (OUTPATIENT)
Dept: FAMILY MEDICINE CLINIC | Facility: CLINIC | Age: 69
End: 2024-04-24
Payer: MEDICARE

## 2024-04-24 ENCOUNTER — LAB (OUTPATIENT)
Dept: FAMILY MEDICINE CLINIC | Facility: CLINIC | Age: 69
End: 2024-04-24
Payer: MEDICARE

## 2024-04-24 VITALS
DIASTOLIC BLOOD PRESSURE: 68 MMHG | BODY MASS INDEX: 29.37 KG/M2 | OXYGEN SATURATION: 97 % | HEIGHT: 73 IN | SYSTOLIC BLOOD PRESSURE: 132 MMHG | WEIGHT: 221.6 LBS | RESPIRATION RATE: 18 BRPM | HEART RATE: 56 BPM

## 2024-04-24 DIAGNOSIS — K21.9 GASTROESOPHAGEAL REFLUX DISEASE, UNSPECIFIED WHETHER ESOPHAGITIS PRESENT: ICD-10-CM

## 2024-04-24 DIAGNOSIS — E04.1 THYROID NODULE: ICD-10-CM

## 2024-04-24 DIAGNOSIS — Z00.00 MEDICARE ANNUAL WELLNESS VISIT, SUBSEQUENT: Primary | ICD-10-CM

## 2024-04-24 DIAGNOSIS — R35.1 BENIGN PROSTATIC HYPERPLASIA WITH NOCTURIA: ICD-10-CM

## 2024-04-24 DIAGNOSIS — E78.5 HYPERLIPIDEMIA, UNSPECIFIED HYPERLIPIDEMIA TYPE: ICD-10-CM

## 2024-04-24 DIAGNOSIS — I10 PRIMARY HYPERTENSION: ICD-10-CM

## 2024-04-24 DIAGNOSIS — N40.1 BENIGN PROSTATIC HYPERPLASIA WITH NOCTURIA: ICD-10-CM

## 2024-04-24 DIAGNOSIS — E55.9 VITAMIN D DEFICIENCY, UNSPECIFIED: ICD-10-CM

## 2024-04-24 PROBLEM — Z02.89 ENCOUNTER FOR FEDERAL AVIATION ADMINISTRATION (FAA) EXAMINATION: Status: RESOLVED | Noted: 2023-05-15 | Resolved: 2024-04-24

## 2024-04-24 LAB
BASOPHILS # BLD AUTO: 0.03 10*3/MM3 (ref 0–0.2)
BASOPHILS NFR BLD AUTO: 0.5 % (ref 0–1.5)
DEPRECATED RDW RBC AUTO: 39.7 FL (ref 37–54)
EOSINOPHIL # BLD AUTO: 0.26 10*3/MM3 (ref 0–0.4)
EOSINOPHIL NFR BLD AUTO: 3.9 % (ref 0.3–6.2)
ERYTHROCYTE [DISTWIDTH] IN BLOOD BY AUTOMATED COUNT: 12.7 % (ref 12.3–15.4)
HCT VFR BLD AUTO: 42.6 % (ref 37.5–51)
HGB BLD-MCNC: 13.9 G/DL (ref 13–17.7)
IMM GRANULOCYTES # BLD AUTO: 0.02 10*3/MM3 (ref 0–0.05)
IMM GRANULOCYTES NFR BLD AUTO: 0.3 % (ref 0–0.5)
LYMPHOCYTES # BLD AUTO: 2.79 10*3/MM3 (ref 0.7–3.1)
LYMPHOCYTES NFR BLD AUTO: 41.9 % (ref 19.6–45.3)
MCH RBC QN AUTO: 28.1 PG (ref 26.6–33)
MCHC RBC AUTO-ENTMCNC: 32.6 G/DL (ref 31.5–35.7)
MCV RBC AUTO: 86.1 FL (ref 79–97)
MONOCYTES # BLD AUTO: 0.51 10*3/MM3 (ref 0.1–0.9)
MONOCYTES NFR BLD AUTO: 7.7 % (ref 5–12)
NEUTROPHILS NFR BLD AUTO: 3.05 10*3/MM3 (ref 1.7–7)
NEUTROPHILS NFR BLD AUTO: 45.7 % (ref 42.7–76)
NRBC BLD AUTO-RTO: 0 /100 WBC (ref 0–0.2)
PLATELET # BLD AUTO: 222 10*3/MM3 (ref 140–450)
PMV BLD AUTO: 9.7 FL (ref 6–12)
RBC # BLD AUTO: 4.95 10*6/MM3 (ref 4.14–5.8)
WBC NRBC COR # BLD AUTO: 6.66 10*3/MM3 (ref 3.4–10.8)

## 2024-04-24 PROCEDURE — 36415 COLL VENOUS BLD VENIPUNCTURE: CPT | Performed by: FAMILY MEDICINE

## 2024-04-24 PROCEDURE — 82306 VITAMIN D 25 HYDROXY: CPT | Performed by: FAMILY MEDICINE

## 2024-04-24 PROCEDURE — 84443 ASSAY THYROID STIM HORMONE: CPT | Performed by: FAMILY MEDICINE

## 2024-04-24 PROCEDURE — 84439 ASSAY OF FREE THYROXINE: CPT | Performed by: FAMILY MEDICINE

## 2024-04-24 PROCEDURE — 80061 LIPID PANEL: CPT | Performed by: FAMILY MEDICINE

## 2024-04-24 PROCEDURE — 85025 COMPLETE CBC W/AUTO DIFF WBC: CPT | Performed by: FAMILY MEDICINE

## 2024-04-24 PROCEDURE — 80053 COMPREHEN METABOLIC PANEL: CPT | Performed by: FAMILY MEDICINE

## 2024-04-24 PROCEDURE — 82607 VITAMIN B-12: CPT | Performed by: FAMILY MEDICINE

## 2024-04-24 NOTE — PROGRESS NOTES
The ABCs of the Annual Wellness Visit  Subsequent Medicare Wellness Visit    Subjective    Nehemias Strong Jr. is a 68 y.o. male who presents for a Subsequent Medicare Wellness Visit.    The following portions of the patient's history were reviewed and   updated as appropriate: allergies, current medications, past family history, past medical history, past social history, past surgical history, and problem list.    Compared to one year ago, the patient feels his physical   health is the same.    Compared to one year ago, the patient feels his mental   health is the same.    Recent Hospitalizations:  This patient has NOT had a [facility] admission within the last 365 days.    Current Medical Providers:  Patient Care Team:  Jona Vieira MD as PCP - General (Internal Medicine)  Alejandro Smith MD as Consulting Physician (Cardiac Electrophysiology)  Luis Carlos Travis MD as Consulting Physician (Urology)  Robe Gaviria MD as Consulting Physician (Gastroenterology)    Outpatient Medications Prior to Visit   Medication Sig Dispense Refill    aspirin 81 MG chewable tablet Chew 1 tablet Daily. Stopped already  LAST DOSE 9-21-22      atorvastatin (LIPITOR) 20 MG tablet Take 1 tablet by mouth Daily.      losartan (COZAAR) 25 MG tablet TAKE 1 TABLET BY MOUTH DAILY 90 tablet 1    Multiple Vitamin (MULTIVITAMIN) capsule Take 1 capsule by mouth Daily. Stop 9-23-22      Sodium Chloride-Sodium Bicarb (AYR SALINE NASAL RINSE NA) into the nostril(s) as directed by provider.      amoxicillin-clavulanate (AUGMENTIN) 875-125 MG per tablet Take 1 tablet by mouth 2 (Two) Times a Day for 10 days. 20 tablet 0    Cholecalciferol 10 MCG (400 UNIT) tablet 0      montelukast (SINGULAIR) 10 MG tablet TAKE 1 TABLET BY MOUTH EVERY NIGHT 90 tablet 1    pantoprazole (PROTONIX) 40 MG EC tablet TAKE 1 TABLET BY MOUTH TWICE DAILY 180 tablet 0     No facility-administered medications prior to visit.     No opioid  "medication identified on active medication list. I have reviewed chart for other potential  high risk medication/s and harmful drug interactions in the elderly.      Aspirin is on active medication list. Aspirin use is indicated based on review of current medical condition/s. Pros and cons of this therapy have been discussed today. Benefits of this medication outweigh potential harm.  Patient has been encouraged to continue taking this medication.  .    Patient Active Problem List   Diagnosis    Vitamin D deficiency    Migraine headache    Kidney stones    Idiopathic cyst of iris, ciliary body, or anterior chamber    Hyperlipidemia    Gastroesophageal reflux disease    Benign prostatic hyperplasia with nocturia    Bradycardia    Cardiac arrhythmia    Allergic rhinitis    Benign neoplasm of skin of eyelid    Family history of lung cancer    Other specified diseases of anus and rectum    Odynophagia    Plantar fasciitis    Rectal prolapse    Thyroid nodule    Primary hypertension    Lipoma    Obstructive lung disease (generalized)    Clinical diagnosis of severe acute respiratory syndrome coronavirus 2 (SARS-CoV-2) disease    Dvrtclos of lg int w/o perforation or abscess w/o bleeding    Coronary artery calcification    Dysphagia     Advance Care Planning   Advance Care Planning     Advance Directive is not on file.  ACP discussion was held with the patient during this visit. Patient has an advance directive (not in EMR), copy requested.     Objective    Vitals:    04/24/24 1511   BP: 132/68   Pulse: 56   Resp: 18   SpO2: 97%   Weight: 101 kg (221 lb 9.6 oz)   Height: 185.4 cm (73\")     Estimated body mass index is 29.24 kg/m² as calculated from the following:    Height as of this encounter: 185.4 cm (73\").    Weight as of this encounter: 101 kg (221 lb 9.6 oz).    BMI is >= 25 and <30. (Overweight) The following options were offered after discussion;: weight loss educational material (shared in after visit " summary)    Does the patient have evidence of cognitive impairment? No  Lab Results   Component Value Date    TRIG 111 2024    HDL 34 (L) 2024    LDL 60 2024    VLDL 21 2024        HEALTH RISK ASSESSMENT    Smoking Status:  Social History     Tobacco Use   Smoking Status Never    Passive exposure: Never   Smokeless Tobacco Never     Alcohol Consumption:  Social History     Substance and Sexual Activity   Alcohol Use No     Fall Risk Screen:  BINTA Fall Risk Assessment was completed, and patient is at LOW risk for falls.Assessment completed on:2024    Depression Screenin/24/2024     3:10 PM   PHQ-2/PHQ-9 Depression Screening   Little Interest or Pleasure in Doing Things 0-->not at all   Feeling Down, Depressed or Hopeless 0-->not at all   PHQ-9: Brief Depression Severity Measure Score 0     Health Habits and Functional and Cognitive Screenin/17/2024     8:52 AM   Functional & Cognitive Status   Do you have difficulty preparing food and eating? No   Do you have difficulty bathing yourself, getting dressed or grooming yourself? No   Do you have difficulty using the toilet? No   Do you have difficulty moving around from place to place? No   Do you have trouble with steps or getting out of a bed or a chair? No   Current Diet Limited Junk Food   Dental Exam Up to date   Eye Exam Up to date   Exercise (times per week) 3 times per week   Current Exercises Include Aerobics;Cardiovascular Workout;Stationary Bicycling/Spin Class;Nicolas Chi   Do you need help using the phone?  No   Are you deaf or do you have serious difficulty hearing?  No   Do you need help to go to places out of walking distance? No   Do you need help shopping? No   Do you need help preparing meals?  No   Do you need help with housework?  No   Do you need help with laundry? No   Do you need help taking your medications? No   Do you need help managing money? No   Do you ever drive or ride in a car without wearing a  seat belt? No   Have you felt unusual stress, anger or loneliness in the last month? No   Who do you live with? Spouse   If you need help, do you have trouble finding someone available to you? No   Have you been bothered in the last four weeks by sexual problems? No   Do you have difficulty concentrating, remembering or making decisions? No     Age-appropriate Screening Schedule:  Refer to the list below for future screening recommendations based on patient's age, sex and/or medical conditions. Orders for these recommended tests are listed in the plan section. The patient has been provided with a written plan.    Health Maintenance   Topic Date Due    RSV Vaccine - Adults (1 - 1-dose 60+ series) Never done    INFLUENZA VACCINE  08/01/2024    ANNUAL WELLNESS VISIT  04/24/2025    LIPID PANEL  04/24/2025    BMI FOLLOWUP  04/24/2025    TDAP/TD VACCINES (2 - Td or Tdap) 09/07/2026    COLORECTAL CANCER SCREENING  03/22/2028    HEPATITIS C SCREENING  Completed    Pneumococcal Vaccine 65+  Completed    ZOSTER VACCINE  Completed    COVID-19 Vaccine  Discontinued        CMS Preventative Services Quick Reference  Risk Factors Identified During Encounter  Immunizations Discussed/Encouraged: Influenza  The above risks/problems have been discussed with the patient.  Pertinent information has been shared with the patient in the After Visit Summary.  An After Visit Summary and PPPS were made available to the patient.    Preventative:  Colonoscopy: 3/2020, due 3/3025  PSA: 1.23 (4/2024)  Shingles: Zostavax 5/2018, Shingrix 2/2020  Pneumonia: PCV20 7/2023  Tdap: 9/2016  RSV: Recommended  Influenza: 10/2022, recommended  COVID: Completed 3 shots Pfizer (10/2021)    Follow Up:   Next Medicare Wellness visit to be scheduled in 1 year.       Additional E&M Note during same encounter follows:  Patient has multiple medical problems which are significant and separately identifiable that require additional work above and beyond the Medicare  "Wellness Visit.      Chief Complaint  Medicare Wellness-subsequent    Subjective        HPI  Nehemias Strong Jr. is also being seen today for multiple medical problems    HTN: 132/68 today. Maintained on losartan 25 daily. No LH/dizziness, CP, or SOB.      HLD: maintained on atorvastatin 20mg daily. No concerns     GERD: no longer taking pantoprazole 40mg. No symptoms of heartburn/reflux.       Thyroid nodule: Incidental finding and work-up of hemoptysis-as seen on 2/2020 CT chest. R-sided, 1.7cm in size. 5/2020 US revealed 2.4cm mixed cystic and solid nodule. This is followed by ENT (Dr PRATHER) who has performed FNA and is following w/ serial US. 12/2023 CT chest w/ 1.5cm thyroid nodule- stable. Does report some discomfort swallowing and throat clearing.     BPH: patient reports 1-2 episodes of nocturia nightly. Not interested in medication.     Cough now resolved. Does have some throat clearing but largely has improved/resolved on his own       Objective   Vital Signs:  /68   Pulse 56   Resp 18   Ht 185.4 cm (73\")   Wt 101 kg (221 lb 9.6 oz)   SpO2 97%   BMI 29.24 kg/m²     Physical Exam  Constitutional:       General: He is not in acute distress.     Appearance: He is well-developed.   HENT:      Head: Normocephalic and atraumatic.      Right Ear: Tympanic membrane and external ear normal. There is no impacted cerumen.      Left Ear: Tympanic membrane and external ear normal. There is no impacted cerumen.      Nose: Nose normal.      Mouth/Throat:      Mouth: Mucous membranes are moist.      Pharynx: No oropharyngeal exudate or posterior oropharyngeal erythema.   Eyes:      General: No scleral icterus.        Right eye: No discharge.         Left eye: No discharge.      Extraocular Movements: Extraocular movements intact.      Conjunctiva/sclera: Conjunctivae normal.      Pupils: Pupils are equal, round, and reactive to light.   Neck:      Thyroid: No thyromegaly.      Vascular: No carotid bruit. "   Cardiovascular:      Rate and Rhythm: Normal rate and regular rhythm.      Heart sounds: Normal heart sounds. No murmur heard.  Pulmonary:      Effort: Pulmonary effort is normal. No respiratory distress.      Breath sounds: Normal breath sounds. No wheezing or rales.   Abdominal:      General: Bowel sounds are normal. There is no distension.      Palpations: Abdomen is soft.      Tenderness: There is no abdominal tenderness.   Musculoskeletal:         General: No deformity. Normal range of motion.      Cervical back: Normal range of motion and neck supple.   Lymphadenopathy:      Cervical: No cervical adenopathy.   Skin:     General: Skin is warm and dry.      Findings: No rash.   Neurological:      General: No focal deficit present.      Mental Status: He is alert and oriented to person, place, and time. Mental status is at baseline.      Cranial Nerves: No cranial nerve deficit.      Sensory: No sensory deficit.   Psychiatric:         Behavior: Behavior normal.         Thought Content: Thought content normal.             Assessment and Plan   Diagnoses and all orders for this visit:    1. Medicare annual wellness visit, subsequent (Primary)  -     CBC & Differential  -     Comprehensive Metabolic Panel  -     Lipid Panel  -     TSH  -     T4, Free  -     Vitamin D,25-Hydroxy  -     Vitamin B12  -  Counseled regarding diet, exercise, weight loss, and preventative health maintenance items/immunizations above    2. Primary hypertension: 132/68 today  -     Comprehensive Metabolic Panel  - Cont home losartan 25mg daily    3. Hyperlipidemia, unspecified hyperlipidemia type  -     Lipid Panel  - Cont home atorvastatin 20mg daily    4. Gastroesophageal reflux disease, unspecified whether esophagitis present: resolved   - D/C pantoprazole    5. Thyroid nodule: hx negative FNA and repeat imaging negative   - Cont ENT f/u- K   - Cont annual US for surveillance     6. Benign prostatic hyperplasia with nocturia: 1-2  episodes of nocturia and not interested in medication   - Monitor    7. Vitamin D deficiency, unspecified  -     Vitamin D,25-Hydroxy       Follow Up   Return in about 1 year (around 4/24/2025) for Medicare Wellness.  Patient was given instructions and counseling regarding his condition or for health maintenance advice. Please see specific information pulled into the AVS if appropriate.

## 2024-04-25 ENCOUNTER — OFFICE (AMBULATORY)
Dept: URBAN - METROPOLITAN AREA CLINIC 64 | Facility: CLINIC | Age: 69
End: 2024-04-25

## 2024-04-25 VITALS
HEIGHT: 73 IN | DIASTOLIC BLOOD PRESSURE: 78 MMHG | WEIGHT: 219 LBS | SYSTOLIC BLOOD PRESSURE: 128 MMHG | HEART RATE: 52 BPM

## 2024-04-25 DIAGNOSIS — R10.32 LEFT LOWER QUADRANT PAIN: ICD-10-CM

## 2024-04-25 DIAGNOSIS — R19.7 DIARRHEA, UNSPECIFIED: ICD-10-CM

## 2024-04-25 LAB
25(OH)D3 SERPL-MCNC: 56.6 NG/ML (ref 30–100)
ALBUMIN SERPL-MCNC: 4.3 G/DL (ref 3.5–5.2)
ALBUMIN/GLOB SERPL: 1.6 G/DL
ALP SERPL-CCNC: 73 U/L (ref 39–117)
ALT SERPL W P-5'-P-CCNC: 27 U/L (ref 1–41)
ANION GAP SERPL CALCULATED.3IONS-SCNC: 9.6 MMOL/L (ref 5–15)
AST SERPL-CCNC: 24 U/L (ref 1–40)
BILIRUB SERPL-MCNC: 0.3 MG/DL (ref 0–1.2)
BUN SERPL-MCNC: 11 MG/DL (ref 8–23)
BUN/CREAT SERPL: 8.9 (ref 7–25)
CALCIUM SPEC-SCNC: 9.5 MG/DL (ref 8.6–10.5)
CHLORIDE SERPL-SCNC: 103 MMOL/L (ref 98–107)
CHOLEST SERPL-MCNC: 115 MG/DL (ref 0–200)
CO2 SERPL-SCNC: 27.4 MMOL/L (ref 22–29)
CREAT SERPL-MCNC: 1.24 MG/DL (ref 0.76–1.27)
EGFRCR SERPLBLD CKD-EPI 2021: 64.1 ML/MIN/1.73
GLOBULIN UR ELPH-MCNC: 2.7 GM/DL
GLUCOSE SERPL-MCNC: 84 MG/DL (ref 65–99)
HDLC SERPL-MCNC: 34 MG/DL (ref 40–60)
LDLC SERPL CALC-MCNC: 60 MG/DL (ref 0–100)
LDLC/HDLC SERPL: 1.73 {RATIO}
POTASSIUM SERPL-SCNC: 4 MMOL/L (ref 3.5–5.2)
PROT SERPL-MCNC: 7 G/DL (ref 6–8.5)
SODIUM SERPL-SCNC: 140 MMOL/L (ref 136–145)
T4 FREE SERPL-MCNC: 1.09 NG/DL (ref 0.93–1.7)
TRIGL SERPL-MCNC: 111 MG/DL (ref 0–150)
TSH SERPL DL<=0.05 MIU/L-ACNC: 2.23 UIU/ML (ref 0.27–4.2)
VIT B12 BLD-MCNC: 641 PG/ML (ref 211–946)
VLDLC SERPL-MCNC: 21 MG/DL (ref 5–40)

## 2024-04-25 PROCEDURE — 99213 OFFICE O/P EST LOW 20 MIN: CPT

## 2024-07-01 ENCOUNTER — TELEPHONE (OUTPATIENT)
Dept: FAMILY MEDICINE CLINIC | Facility: CLINIC | Age: 69
End: 2024-07-01
Payer: MEDICARE

## 2024-07-01 NOTE — TELEPHONE ENCOUNTER
PT STATES HE IS HAVING TINGLING IN HANDS AND FEET, HAS AWAKENED COVERED IN SWEAT. PT REQUESTS ADVICE. [facility]

## 2024-07-06 ENCOUNTER — HOSPITAL ENCOUNTER (OUTPATIENT)
Facility: HOSPITAL | Age: 69
Discharge: HOME | End: 2024-07-06
Attending: EMERGENCY MEDICINE
Payer: MEDICARE

## 2024-07-06 VITALS
SYSTOLIC BLOOD PRESSURE: 147 MMHG | RESPIRATION RATE: 18 BRPM | TEMPERATURE: 98.1 F | DIASTOLIC BLOOD PRESSURE: 79 MMHG | OXYGEN SATURATION: 97 % | BODY MASS INDEX: 28.37 KG/M2 | HEART RATE: 57 BPM | WEIGHT: 214.1 LBS | HEIGHT: 73 IN

## 2024-07-06 DIAGNOSIS — T50.905A MEDICATION SIDE EFFECT, INITIAL ENCOUNTER: ICD-10-CM

## 2024-07-06 DIAGNOSIS — R61 NIGHT SWEATS: Primary | ICD-10-CM

## 2024-07-06 DIAGNOSIS — B35.6 TINEA CRURIS: ICD-10-CM

## 2024-07-06 PROCEDURE — 99213 OFFICE O/P EST LOW 20 MIN: CPT | Performed by: EMERGENCY MEDICINE

## 2024-07-06 PROCEDURE — G0463 HOSPITAL OUTPT CLINIC VISIT: HCPCS | Performed by: EMERGENCY MEDICINE

## 2024-07-06 RX ORDER — FLUCONAZOLE 150 MG/1
150 TABLET ORAL ONCE
Status: COMPLETED | OUTPATIENT
Start: 2024-07-06 | End: 2024-07-06

## 2024-07-06 RX ORDER — NYSTATIN 100000 [USP'U]/G
POWDER TOPICAL 2 TIMES DAILY
Qty: 60 G | Refills: 3 | Status: SHIPPED | OUTPATIENT
Start: 2024-07-06 | End: 2024-08-05

## 2024-07-06 RX ADMIN — FLUCONAZOLE 150 MG: 150 TABLET ORAL at 09:50

## 2024-07-06 NOTE — DISCHARGE INSTRUCTIONS
If nystatin powder runs out or is unwieldy, you may use Tinactin aerosol powder spray over-the-counter.  Stop topical testosterone for a few days and see if night sweats continue.  If they do not, restart testosterone and see if they return.  Follow-up with your primary care provider as scheduled.

## 2024-07-06 NOTE — ED NOTES
Pt reports night sweats x3 in the last 8 days that has woken him up out of his sleep. Pt states he is supposed to be seen by PCP on 7/16. Pt states that he has been a little anxious intermittently. Traveled within [place] the last month, no exposures that he is aware of. Pt states he has been wearing a blood glucose monitoring and BG running in low 100s. Pt states his daughter has been going through a lengthy divorce that he believes he has been over thinking about. Here today to have his scrotum looked at.

## 2024-07-06 NOTE — FSED PROVIDER NOTE
HPI: 68-year-old male arrives complaining of a rash of his groin.  He also has been experiencing night sweats x 3 in the last 8 days most recently last night.  He was recently prescribed testosterone cream which she has been using over the last couple of weeks.  He had his testosterone levels checked as low and has been experiencing some sexual dysfunction.  He has an appointment with his primary care physician on the 16th.  Recently he was prescribed a blood glucose monitor which has been measuring his sugars to be between 100- 110 consistently.    PMFSH: see problem list... Hypertension, dyslipidemia, retired [employer] ,  x 42 years, 7 children, 1 of whom lives at home.  He has a daughter going through a difficult divorce.  He runs 5 km races.  No history of stroke, heart attack, cancer.  Umbilical herniorrhaphy, right inguinal herniorrhaphy.    ROS: As above.  All other systems are reviewed and negative.    PHYSICAL EXAM: Tall stature lean build BMI 28    ENT moist mucous membranes    Neck supple without thyromegaly    Lungs clear to auscultation bilaterally    Heart regular rate and rhythm without murmur rub or gallop    Abdomen soft nontender    Genitalia circumcised male descended testes no lesions noted on the skin, but he does have tinea cruris with a cheesy exudate in the intertriginous zone of his perineal space    Feet show signs of mild onychomycosis, but no skin scaling or other evidence of athlete's foot    MDM: Feel his night sweats are probably related to the testosterone cream which she should stop temporarily.  Vital signs are normal and patient is suitable for outpatient treatment.  He does have tinea cruris which will be treated with antifungals.    ED COURSE: He was given Diflucan and a prescription for nystatin powder    DIAGNOSIS: Tinea cruris, night sweats, medication side effect    DISPOSITION: Patient is discharged from the ED in good condition.

## 2024-07-16 ENCOUNTER — LAB (OUTPATIENT)
Dept: FAMILY MEDICINE CLINIC | Facility: CLINIC | Age: 69
End: 2024-07-16
Payer: MEDICARE

## 2024-07-16 ENCOUNTER — OFFICE VISIT (OUTPATIENT)
Dept: FAMILY MEDICINE CLINIC | Facility: CLINIC | Age: 69
End: 2024-07-16
Payer: MEDICARE

## 2024-07-16 VITALS
SYSTOLIC BLOOD PRESSURE: 124 MMHG | WEIGHT: 208.4 LBS | HEART RATE: 55 BPM | HEIGHT: 73 IN | OXYGEN SATURATION: 97 % | RESPIRATION RATE: 20 BRPM | BODY MASS INDEX: 27.62 KG/M2 | DIASTOLIC BLOOD PRESSURE: 72 MMHG

## 2024-07-16 DIAGNOSIS — R20.0 NUMBNESS: ICD-10-CM

## 2024-07-16 DIAGNOSIS — M25.552 LEFT HIP PAIN: ICD-10-CM

## 2024-07-16 DIAGNOSIS — R61 NIGHT SWEATS: ICD-10-CM

## 2024-07-16 DIAGNOSIS — B35.6 TINEA CRURIS: ICD-10-CM

## 2024-07-16 DIAGNOSIS — R73.09 ABNORMAL BLOOD SUGAR: Primary | ICD-10-CM

## 2024-07-16 DIAGNOSIS — Z12.5 SCREENING PSA (PROSTATE SPECIFIC ANTIGEN): ICD-10-CM

## 2024-07-16 LAB
ALBUMIN SERPL-MCNC: 4.5 G/DL (ref 3.5–5.2)
ALBUMIN/GLOB SERPL: 1.7 G/DL
ALP SERPL-CCNC: 100 U/L (ref 39–117)
ALT SERPL W P-5'-P-CCNC: 23 U/L (ref 1–41)
ANION GAP SERPL CALCULATED.3IONS-SCNC: 9.2 MMOL/L (ref 5–15)
AST SERPL-CCNC: 22 U/L (ref 1–40)
BASOPHILS # BLD AUTO: 0.03 10*3/MM3 (ref 0–0.2)
BASOPHILS NFR BLD AUTO: 0.6 % (ref 0–1.5)
BILIRUB SERPL-MCNC: 0.6 MG/DL (ref 0–1.2)
BUN SERPL-MCNC: 15 MG/DL (ref 8–23)
BUN/CREAT SERPL: 10.6 (ref 7–25)
CALCIUM SPEC-SCNC: 9.6 MG/DL (ref 8.6–10.5)
CHLORIDE SERPL-SCNC: 104 MMOL/L (ref 98–107)
CO2 SERPL-SCNC: 27.8 MMOL/L (ref 22–29)
CREAT SERPL-MCNC: 1.42 MG/DL (ref 0.76–1.27)
DEPRECATED RDW RBC AUTO: 40 FL (ref 37–54)
EGFRCR SERPLBLD CKD-EPI 2021: 54.5 ML/MIN/1.73
EOSINOPHIL # BLD AUTO: 0.11 10*3/MM3 (ref 0–0.4)
EOSINOPHIL NFR BLD AUTO: 2.1 % (ref 0.3–6.2)
ERYTHROCYTE [DISTWIDTH] IN BLOOD BY AUTOMATED COUNT: 12.6 % (ref 12.3–15.4)
GLOBULIN UR ELPH-MCNC: 2.7 GM/DL
GLUCOSE SERPL-MCNC: 99 MG/DL (ref 65–99)
HBA1C MFR BLD: 5.4 % (ref 4.8–5.6)
HCT VFR BLD AUTO: 46.2 % (ref 37.5–51)
HGB BLD-MCNC: 15.4 G/DL (ref 13–17.7)
IMM GRANULOCYTES # BLD AUTO: 0.01 10*3/MM3 (ref 0–0.05)
IMM GRANULOCYTES NFR BLD AUTO: 0.2 % (ref 0–0.5)
LYMPHOCYTES # BLD AUTO: 1.8 10*3/MM3 (ref 0.7–3.1)
LYMPHOCYTES NFR BLD AUTO: 33.7 % (ref 19.6–45.3)
MCH RBC QN AUTO: 29.3 PG (ref 26.6–33)
MCHC RBC AUTO-ENTMCNC: 33.3 G/DL (ref 31.5–35.7)
MCV RBC AUTO: 87.8 FL (ref 79–97)
MONOCYTES # BLD AUTO: 0.45 10*3/MM3 (ref 0.1–0.9)
MONOCYTES NFR BLD AUTO: 8.4 % (ref 5–12)
NEUTROPHILS NFR BLD AUTO: 2.94 10*3/MM3 (ref 1.7–7)
NEUTROPHILS NFR BLD AUTO: 55 % (ref 42.7–76)
NRBC BLD AUTO-RTO: 0 /100 WBC (ref 0–0.2)
PLATELET # BLD AUTO: 217 10*3/MM3 (ref 140–450)
PMV BLD AUTO: 9.8 FL (ref 6–12)
POTASSIUM SERPL-SCNC: 4.4 MMOL/L (ref 3.5–5.2)
PROT SERPL-MCNC: 7.2 G/DL (ref 6–8.5)
PSA SERPL-MCNC: 11.9 NG/ML (ref 0–4)
RBC # BLD AUTO: 5.26 10*6/MM3 (ref 4.14–5.8)
SODIUM SERPL-SCNC: 141 MMOL/L (ref 136–145)
WBC NRBC COR # BLD AUTO: 5.34 10*3/MM3 (ref 3.4–10.8)

## 2024-07-16 PROCEDURE — 3074F SYST BP LT 130 MM HG: CPT | Performed by: FAMILY MEDICINE

## 2024-07-16 PROCEDURE — 36415 COLL VENOUS BLD VENIPUNCTURE: CPT | Performed by: FAMILY MEDICINE

## 2024-07-16 PROCEDURE — 1160F RVW MEDS BY RX/DR IN RCRD: CPT | Performed by: FAMILY MEDICINE

## 2024-07-16 PROCEDURE — 83036 HEMOGLOBIN GLYCOSYLATED A1C: CPT | Performed by: FAMILY MEDICINE

## 2024-07-16 PROCEDURE — 1126F AMNT PAIN NOTED NONE PRSNT: CPT | Performed by: FAMILY MEDICINE

## 2024-07-16 PROCEDURE — G0103 PSA SCREENING: HCPCS | Performed by: FAMILY MEDICINE

## 2024-07-16 PROCEDURE — 3078F DIAST BP <80 MM HG: CPT | Performed by: FAMILY MEDICINE

## 2024-07-16 PROCEDURE — 99214 OFFICE O/P EST MOD 30 MIN: CPT | Performed by: FAMILY MEDICINE

## 2024-07-16 PROCEDURE — 1159F MED LIST DOCD IN RCRD: CPT | Performed by: FAMILY MEDICINE

## 2024-07-16 PROCEDURE — 85025 COMPLETE CBC W/AUTO DIFF WBC: CPT | Performed by: FAMILY MEDICINE

## 2024-07-16 PROCEDURE — 80053 COMPREHEN METABOLIC PANEL: CPT | Performed by: FAMILY MEDICINE

## 2024-07-16 RX ORDER — BLOOD-GLUCOSE SENSOR
EACH MISCELLANEOUS
COMMUNITY
Start: 2024-07-02

## 2024-07-16 RX ORDER — CRANBERRY FRUIT EXTRACT 650 MG
25 CAPSULE ORAL DAILY
COMMUNITY

## 2024-07-16 RX ORDER — TESTOSTERONE GEL, 1% 10 MG/G
50 GEL TRANSDERMAL DAILY
COMMUNITY

## 2024-07-16 RX ORDER — MULTIPLE VITAMINS W/ MINERALS TAB 9MG-400MCG
1 TAB ORAL DAILY
COMMUNITY

## 2024-07-16 NOTE — PROGRESS NOTES
Chief Complaint   Patient presents with    Prediabetes    Night Sweats    Tingling     Hands & Feets     HPI  Nehemias Strong Jr. is a 68 y.o. male that presents for   Chief Complaint   Patient presents with    Prediabetes    Night Sweats    Tingling     Hands & Feets     Abnormal blood sugars: patient has been seeing a , who has him started on CGM (Libre3). Patient has noticed postprandial sugars over 160 at times as well as dipping into the 50s intermittently. Does feel bad when sugars are low but his phone alarms and he wanted to check on this. Also concerned about how high his sugars get and potential prediabetes    Night sweats: patient reports night sweats for years but these have been worse in the last 3 months. Now occurring 2x/month. Patient has been trying to lose weight and is down nearly 20lbs in the last 6 months.    Tingling: patient reports numbness/tingling in the tip of the L 2nd toe. He is concerned about neuropathy. Patient also has callus over the tip of this toe    Patient reports recently being placed on testosterone replacement by his nutritionist. He has been using this for the last month    Has noticed increased L hip pain w/ running and stretches recently    Review of Systems  Pertinent positives of ROS documented in HPI    The following portions of the patient's history were reviewed and updated as appropriate: problem list, past medical history, past surgical history, allergies, current medications, past social history and past family history.    Problem List Tab  Patient History Tab  Immunizations Tab  Medications Tab  Chart Review Tab  Care Everywhere Tab  Synopsis Tab    PE  Vitals:    07/16/24 0913   BP: 124/72   Pulse: 55   Resp: 20   SpO2: 97%     Body mass index is 27.5 kg/m².  General: Well nourished, NAD  Head: AT/NC  Eyes: EOMI, anicteric sclera  Resp: CTAB, SCR, BS equal  CV: RRR w/o m/r/g; 2+ pulses  GI: Soft, NT/ND, +BS  MSK: FROM, no deformity, no edema  Skin:  Warm, dry, intact  Neuro: Alert and oriented. No focal deficits. Sensation intact  Psych: Appropriate mood and affect    Imaging  CT Abdomen Pelvis With Contrast    Result Date: 4/17/2024  Impression: 1. Sigmoid diverticulosis with no evidence of acute diverticulitis. Few additional nonemergent findings as above 2. No definite acute CT abnormality in the abdomen or pelvis Electronically Signed: German Almanza MD  4/17/2024 11:09 AM EDT  Workstation ID: [id number]      Assessment & Plan   Nehemias Strong Jr. is a 68 y.o. male that presents for   Chief Complaint   Patient presents with    Prediabetes    Night Sweats    Tingling     Hands & Feets     Diagnoses and all orders for this visit:    1. Abnormal blood sugar (Primary): recent CGM sugars concerning for possible prediabetes. Will evaluate w/ labs as below  -     Comprehensive Metabolic Panel  -     Hemoglobin A1c  - Cont diet and exercise efforts    2. Night sweats: lifelong but worse recently. Possibly exacerbated by testosterone supplementation from his nutritionist  -     CBC & Differential    3. Numbness: sounds less c/w neuropathy w/ intact sensation throughout foot. Likely reduced sensation along callus of toe. Recent Vitamin D and B12 normal.   -     Hemoglobin A1c  - Monitor for sore involving the L 2nd toe    4. Screening PSA (prostate specific antigen): last PSA completed over 1 year ago and now maintained on testosterone supplement from outside source  -     PSA Screen    5. Tinea cruris   - Recommend use of nystatin powder    6. Left hip pain: new complaint w/ increase in activity recently  -     XR Hip With or Without Pelvis 2 - 3 View Left; Future       Return if symptoms worsen or fail to improve.

## 2024-07-17 ENCOUNTER — TELEPHONE (OUTPATIENT)
Dept: FAMILY MEDICINE CLINIC | Facility: CLINIC | Age: 69
End: 2024-07-17

## 2024-07-17 ENCOUNTER — LAB (OUTPATIENT)
Dept: FAMILY MEDICINE CLINIC | Facility: CLINIC | Age: 69
End: 2024-07-17
Payer: MEDICARE

## 2024-07-17 DIAGNOSIS — R97.20 ELEVATED PSA: Primary | ICD-10-CM

## 2024-07-17 DIAGNOSIS — M25.552 LEFT HIP PAIN: ICD-10-CM

## 2024-07-17 DIAGNOSIS — R97.20 ELEVATED PSA: ICD-10-CM

## 2024-07-17 LAB
BILIRUB UR QL STRIP: NEGATIVE
CLARITY UR: CLEAR
COLOR UR: YELLOW
GLUCOSE UR STRIP-MCNC: NEGATIVE MG/DL
HGB UR QL STRIP.AUTO: NEGATIVE
KETONES UR QL STRIP: NEGATIVE
LEUKOCYTE ESTERASE UR QL STRIP.AUTO: NEGATIVE
NITRITE UR QL STRIP: NEGATIVE
PH UR STRIP.AUTO: 6 [PH] (ref 5–8)
PROT UR QL STRIP: NEGATIVE
SP GR UR STRIP: 1.01 (ref 1–1.03)
UROBILINOGEN UR QL STRIP: NORMAL

## 2024-07-17 PROCEDURE — 84154 ASSAY OF PSA FREE: CPT | Performed by: FAMILY MEDICINE

## 2024-07-17 PROCEDURE — 84153 ASSAY OF PSA TOTAL: CPT | Performed by: FAMILY MEDICINE

## 2024-07-17 PROCEDURE — 36415 COLL VENOUS BLD VENIPUNCTURE: CPT

## 2024-07-17 PROCEDURE — 81003 URINALYSIS AUTO W/O SCOPE: CPT | Performed by: FAMILY MEDICINE

## 2024-07-17 NOTE — TELEPHONE ENCOUNTER
"Caller: Nehemias Strong Jr. \"Darrell\"    Relationship: Self    Best call back number: [phone]     What test was performed: PSA    When was the test performed: 07-    Where was the test performed: [facility] IN OFFICE    Additional notes: PATIENT STATED HE HAD BLOODWORK PREFORMED ON 07-, AND RECEIVED RESULTS [facility]IS [facility].    PATIENT STATED HIS PSA RESULT WAS 11.9, AND HE IS VERY CONCERNED BY THIS.    PATIENT STATED HIS PREVIOUS PSA RESULT ON 04- WITH [facility] WAS 0.8.    PATIENT STATED THEIR PHONE [phone], AND HE SEES [facility]    PATIENT WOULD LIKE TO KNOW IF THIS MAY BE A SIGN OF A URINARY TRACT INFECTION OR NOT.    PLEASE CONTACT PATIENT TO ADVISE.  "

## 2024-07-18 DIAGNOSIS — M16.9 OSTEOARTHRITIS OF HIP, UNSPECIFIED LATERALITY, UNSPECIFIED OSTEOARTHRITIS TYPE: Primary | ICD-10-CM

## 2024-07-19 LAB
PSA FREE MFR SERPL: 7.7 %
PSA FREE SERPL-MCNC: 0.68 NG/ML
PSA SERPL-MCNC: 8.8 NG/ML (ref 0–4)

## 2024-07-26 ENCOUNTER — TELEPHONE (OUTPATIENT)
Dept: FAMILY MEDICINE CLINIC | Facility: CLINIC | Age: 69
End: 2024-07-26
Payer: MEDICARE

## 2024-07-26 NOTE — TELEPHONE ENCOUNTER
Dr. Vieira referred patient to Dr. Oconnor for his left hip. Dr. Oconnor called because the referral didn't go thru right and the fax kept repeating things. Can you please refax the referral and info to them at fax [phone]?

## 2024-07-26 NOTE — TELEPHONE ENCOUNTER
Loren with Dr Oconnor's office called back and said we did NOT need to send info after all. Please disregard.

## 2024-08-08 ENCOUNTER — OFFICE (AMBULATORY)
Dept: URBAN - METROPOLITAN AREA CLINIC 64 | Facility: CLINIC | Age: 69
End: 2024-08-08
Payer: MEDICARE

## 2024-08-08 VITALS
WEIGHT: 204 LBS | DIASTOLIC BLOOD PRESSURE: 80 MMHG | HEART RATE: 49 BPM | SYSTOLIC BLOOD PRESSURE: 132 MMHG | HEIGHT: 73 IN

## 2024-08-08 DIAGNOSIS — Z86.010 PERSONAL HISTORY OF COLONIC POLYPS: ICD-10-CM

## 2024-08-08 DIAGNOSIS — K21.9 GASTRO-ESOPHAGEAL REFLUX DISEASE WITHOUT ESOPHAGITIS: ICD-10-CM

## 2024-08-08 PROCEDURE — 99214 OFFICE O/P EST MOD 30 MIN: CPT | Performed by: INTERNAL MEDICINE

## 2024-10-18 DIAGNOSIS — I10 PRIMARY HYPERTENSION: ICD-10-CM

## 2024-10-18 RX ORDER — LOSARTAN POTASSIUM 25 MG/1
25 TABLET ORAL DAILY
Qty: 90 TABLET | Refills: 1 | Status: SHIPPED | OUTPATIENT
Start: 2024-10-18

## 2024-10-22 ENCOUNTER — TRANSCRIBE ORDERS (OUTPATIENT)
Dept: ADMINISTRATIVE | Facility: HOSPITAL | Age: 69
End: 2024-10-22
Payer: MEDICARE

## 2024-10-22 ENCOUNTER — HOSPITAL ENCOUNTER (OUTPATIENT)
Dept: CARDIOLOGY | Facility: HOSPITAL | Age: 69
Discharge: HOME | End: 2024-10-22
Payer: MEDICARE

## 2024-10-22 ENCOUNTER — LAB (OUTPATIENT)
Dept: LAB | Facility: HOSPITAL | Age: 69
End: 2024-10-22
Payer: MEDICARE

## 2024-10-22 DIAGNOSIS — R73.09 IMPAIRED GLUCOSE TOLERANCE TEST: ICD-10-CM

## 2024-10-22 DIAGNOSIS — Z01.818 OTHER SPECIFIED PRE-OPERATIVE EXAMINATION: ICD-10-CM

## 2024-10-22 DIAGNOSIS — Z01.818 OTHER SPECIFIED PRE-OPERATIVE EXAMINATION: Primary | ICD-10-CM

## 2024-10-22 LAB
ALBUMIN SERPL-MCNC: 4.7 G/DL (ref 3.5–5.2)
ANION GAP SERPL CALCULATED.3IONS-SCNC: 7.5 MMOL/L (ref 5–15)
BASOPHILS # BLD AUTO: 0.03 10*3/MM3 (ref 0–0.2)
BASOPHILS NFR BLD AUTO: 0.5 % (ref 0–1.5)
BUN SERPL-MCNC: 15 MG/DL (ref 8–23)
BUN/CREAT SERPL: 12.9 (ref 7–25)
CALCIUM SPEC-SCNC: 10 MG/DL (ref 8.6–10.5)
CHLORIDE SERPL-SCNC: 104 MMOL/L (ref 98–107)
CO2 SERPL-SCNC: 30.5 MMOL/L (ref 22–29)
CREAT SERPL-MCNC: 1.16 MG/DL (ref 0.76–1.27)
DEPRECATED RDW RBC AUTO: 45.7 FL (ref 37–54)
EGFRCR SERPLBLD CKD-EPI 2021: 69.5 ML/MIN/1.73
EOSINOPHIL # BLD AUTO: 0.04 10*3/MM3 (ref 0–0.4)
EOSINOPHIL NFR BLD AUTO: 0.7 % (ref 0.3–6.2)
ERYTHROCYTE [DISTWIDTH] IN BLOOD BY AUTOMATED COUNT: 13.7 % (ref 12.3–15.4)
GLUCOSE SERPL-MCNC: 97 MG/DL (ref 65–99)
HBA1C MFR BLD: 5.64 % (ref 4.8–5.6)
HCT VFR BLD AUTO: 45.9 % (ref 37.5–51)
HGB BLD-MCNC: 14.6 G/DL (ref 13–17.7)
IMM GRANULOCYTES # BLD AUTO: 0.02 10*3/MM3 (ref 0–0.05)
IMM GRANULOCYTES NFR BLD AUTO: 0.3 % (ref 0–0.5)
LYMPHOCYTES # BLD AUTO: 1.97 10*3/MM3 (ref 0.7–3.1)
LYMPHOCYTES NFR BLD AUTO: 34.1 % (ref 19.6–45.3)
MCH RBC QN AUTO: 28.8 PG (ref 26.6–33)
MCHC RBC AUTO-ENTMCNC: 31.8 G/DL (ref 31.5–35.7)
MCV RBC AUTO: 90.5 FL (ref 79–97)
MONOCYTES # BLD AUTO: 0.39 10*3/MM3 (ref 0.1–0.9)
MONOCYTES NFR BLD AUTO: 6.8 % (ref 5–12)
MRSA DNA SPEC QL NAA+PROBE: NORMAL
NEUTROPHILS NFR BLD AUTO: 3.32 10*3/MM3 (ref 1.7–7)
NEUTROPHILS NFR BLD AUTO: 57.6 % (ref 42.7–76)
NRBC BLD AUTO-RTO: 0 /100 WBC (ref 0–0.2)
PLATELET # BLD AUTO: 215 10*3/MM3 (ref 140–450)
PMV BLD AUTO: 9.7 FL (ref 6–12)
POTASSIUM SERPL-SCNC: 4.5 MMOL/L (ref 3.5–5.2)
RBC # BLD AUTO: 5.07 10*6/MM3 (ref 4.14–5.8)
SODIUM SERPL-SCNC: 142 MMOL/L (ref 136–145)
WBC NRBC COR # BLD AUTO: 5.77 10*3/MM3 (ref 3.4–10.8)

## 2024-10-22 PROCEDURE — 80048 BASIC METABOLIC PNL TOTAL CA: CPT

## 2024-10-22 PROCEDURE — 87641 MR-STAPH DNA AMP PROBE: CPT

## 2024-10-22 PROCEDURE — 83036 HEMOGLOBIN GLYCOSYLATED A1C: CPT

## 2024-10-22 PROCEDURE — 93005 ELECTROCARDIOGRAM TRACING: CPT | Performed by: ORTHOPAEDIC SURGERY

## 2024-10-22 PROCEDURE — 36415 COLL VENOUS BLD VENIPUNCTURE: CPT

## 2024-10-22 PROCEDURE — 85025 COMPLETE CBC W/AUTO DIFF WBC: CPT

## 2024-10-22 PROCEDURE — 82040 ASSAY OF SERUM ALBUMIN: CPT

## 2024-10-27 LAB
QT INTERVAL: 433 MS
QTC INTERVAL: 394 MS

## 2024-12-11 ENCOUNTER — HOSPITAL ENCOUNTER (OUTPATIENT)
Facility: HOSPITAL | Age: 69
Discharge: HOME | End: 2024-12-11
Attending: EMERGENCY MEDICINE | Admitting: EMERGENCY MEDICINE
Payer: MEDICARE

## 2024-12-11 VITALS
RESPIRATION RATE: 16 BRPM | TEMPERATURE: 98.3 F | WEIGHT: 202.8 LBS | OXYGEN SATURATION: 99 % | SYSTOLIC BLOOD PRESSURE: 119 MMHG | DIASTOLIC BLOOD PRESSURE: 74 MMHG | HEIGHT: 73 IN | BODY MASS INDEX: 26.88 KG/M2 | HEART RATE: 59 BPM

## 2024-12-11 DIAGNOSIS — J02.9 PHARYNGITIS, UNSPECIFIED ETIOLOGY: Primary | ICD-10-CM

## 2024-12-11 PROCEDURE — G0463 HOSPITAL OUTPT CLINIC VISIT: HCPCS | Performed by: EMERGENCY MEDICINE

## 2024-12-11 PROCEDURE — 87636 SARSCOV2 & INF A&B AMP PRB: CPT | Performed by: EMERGENCY MEDICINE

## 2024-12-11 PROCEDURE — 25010000002 DEXAMETHASONE PER 1 MG: Performed by: EMERGENCY MEDICINE

## 2024-12-11 PROCEDURE — 87081 CULTURE SCREEN ONLY: CPT | Performed by: EMERGENCY MEDICINE

## 2024-12-11 PROCEDURE — 87651 STREP A DNA AMP PROBE: CPT | Performed by: EMERGENCY MEDICINE

## 2024-12-11 RX ADMIN — DEXAMETHASONE SODIUM PHOSPHATE 10 MG: 10 INJECTION, SOLUTION INTRAMUSCULAR; INTRAVENOUS at 12:28

## 2024-12-11 NOTE — FSED PROVIDER NOTE
Subjective   History of Present Illness  69-year-old male complains of sore throat for 2 days has had some mild greenish discharge that has resolved and become clear.  No cough no fevers no chills no dyspnea  Review of Systems  As noted in HPI  Past Medical History:   Diagnosis Date    Bradycardia 2019    Followed by cardiologist with holter monitor every 6 months.    Cardiac arrhythmia 2019    COVID 2022    Gastroesophageal reflux disease 2019    History of kidney stones     Hyperlipidemia 2019    Hypertension 2017    Losartan    Kidney stone     follows w/ [facility]    Onychomycosis     follow w/ podiatry- Dr Stock    Rectal prolapse 2018    Sleep apnea     Cpap    Thyroid nodule     Dr PRATHER    Umbilical hernia 2022       No Known Allergies    Past Surgical History:   Procedure Laterality Date    BRONCHOSCOPY N/A 2023    Procedure: BRONCHOSCOPY with left lower lobe bronchioalveolar lavage;  Surgeon: Jasper Bobo MD;  Location: [facility] ENDOSCOPY;  Service: Pulmonary;  Laterality: N/A;    COLONOSCOPY      DENTAL PROCEDURE      FINGER SURGERY  2022    cyst removal    HERNIA REPAIR Right     inguinal w/ Lilia    MASS EXCISION N/A 2021    Procedure: lipoma excisions to abdomen, bilateral upper lateral thighs and bialteral arms;  Surgeon: Williams Stafford DO;  Location: [facility] MAIN OR;  Service: General;  Laterality: N/A;    THYROID SURGERY      UMBILICAL HERNIA REPAIR N/A 2022    Procedure: UMBILICAL HERNIA REPAIR with mesh;  Surgeon: Robe Torres MD;  Location: [facility] MAIN OR;  Service: General;  Laterality: N/A;    US GUIDED FINE NEEDLE ASPIRATION      Dr PRATHER       Family History   Problem Relation Age of Onset    Lung cancer Mother         large cell    Cancer Mother         Large cell melanoma from lung cancer, smoking related. , 3/26/94    No Known Problems Father     Birth defects Brother         hole between left and right ventricles at  birth.  at age 6 in , 3 days after experimental surgery.    No Known Problems Maternal Grandmother     No Known Problems Maternal Grandfather     No Known Problems Paternal Grandmother     No Known Problems Paternal Grandfather     No Known Problems Sister        Social History     Socioeconomic History    Marital status:     Number of children: 7   Tobacco Use    Smoking status: Never     Passive exposure: Never    Smokeless tobacco: Never   Vaping Use    Vaping status: Never Used   Substance and Sexual Activity    Alcohol use: No    Drug use: Never    Sexual activity: Yes     Partners: Female     Birth control/protection: Condom           Objective   Physical Exam  Nursing notes reviewed.  INITIAL VITAL SIGNS: Reviewed by me.  Pulse ox normal  GENERAL: Alert. Well developed and well nourished. No respiratory distress.  HEAD: Normocephalic.   EYES: No conjunctival injection.  ENT: Oral mucosa is moist.  Oropharynx is slightly erythematous with midline uvula normal tonsil pillars no unilateral swelling  NECK/BACK: Supple. Full range of motion.  No significant lymphadenopathy  RESPIRATORY: Non-labored respirations.  EXTREMITIES: No deformity.  SKIN: Warm and dry. No rashes. No diaphoresis.  NEUROLOGIC: Alert. Normal gait    Procedures           ED Course  ED Course as of 24 1225   Wed Dec 11, 2024   1223 H&P as above, no evidence for PTA.  Rapid strep negative COVID flu negative.  Giving a dose of Decadron for comfort, will send a throat culture [RO]      ED Course User Index  [RO] Alejandro Aguilar MD                                           Medical Decision Making  Problems Addressed:  Pharyngitis, unspecified etiology: complicated acute illness or injury    Amount and/or Complexity of Data Reviewed  Labs: ordered. Decision-making details documented in ED Course.    Risk  Prescription drug management.        Final diagnoses:   Pharyngitis, unspecified etiology       ED Disposition  ED  Disposition       ED Disposition   Discharge    Condition   Good    Comment   --               Jona Vieira MD  [address]  [place] [id number] [phone]    Call   As needed         Medication List      No changes were made to your prescriptions during this visit.

## 2024-12-13 LAB — BACTERIA SPEC AEROBE CULT: NORMAL

## 2025-02-19 ENCOUNTER — TRANSCRIBE ORDERS (OUTPATIENT)
Dept: PULMONOLOGY | Facility: HOSPITAL | Age: 70
End: 2025-02-19
Payer: MEDICARE

## 2025-02-19 DIAGNOSIS — J84.115 RESPIRATORY BRONCHIOLITIS INTERSTITIAL LUNG DISEASE: Primary | ICD-10-CM

## 2025-02-19 DIAGNOSIS — R05.3 CHRONIC COUGH: ICD-10-CM

## 2025-02-25 ENCOUNTER — HOSPITAL ENCOUNTER (OUTPATIENT)
Dept: PET IMAGING | Facility: HOSPITAL | Age: 70
Discharge: HOME | End: 2025-02-25
Admitting: INTERNAL MEDICINE
Payer: MEDICARE

## 2025-02-25 DIAGNOSIS — J84.115 RESPIRATORY BRONCHIOLITIS INTERSTITIAL LUNG DISEASE: ICD-10-CM

## 2025-02-25 DIAGNOSIS — R05.3 CHRONIC COUGH: ICD-10-CM

## 2025-02-25 PROCEDURE — 71250 CT THORAX DX C-: CPT

## 2025-03-28 DIAGNOSIS — I10 PRIMARY HYPERTENSION: ICD-10-CM

## 2025-03-28 RX ORDER — LOSARTAN POTASSIUM 25 MG/1
25 TABLET ORAL DAILY
Qty: 90 TABLET | Refills: 1 | Status: SHIPPED | OUTPATIENT
Start: 2025-03-28

## 2025-04-30 ENCOUNTER — OFFICE VISIT (OUTPATIENT)
Dept: FAMILY MEDICINE CLINIC | Facility: CLINIC | Age: 70
End: 2025-04-30
Payer: MEDICARE

## 2025-04-30 ENCOUNTER — LAB (OUTPATIENT)
Dept: FAMILY MEDICINE CLINIC | Facility: CLINIC | Age: 70
End: 2025-04-30
Payer: MEDICARE

## 2025-04-30 VITALS
OXYGEN SATURATION: 98 % | HEIGHT: 73 IN | BODY MASS INDEX: 28.97 KG/M2 | RESPIRATION RATE: 20 BRPM | WEIGHT: 218.6 LBS | SYSTOLIC BLOOD PRESSURE: 122 MMHG | DIASTOLIC BLOOD PRESSURE: 74 MMHG | HEART RATE: 52 BPM

## 2025-04-30 DIAGNOSIS — Z00.00 MEDICARE ANNUAL WELLNESS VISIT, SUBSEQUENT: Primary | ICD-10-CM

## 2025-04-30 DIAGNOSIS — N40.1 BENIGN PROSTATIC HYPERPLASIA WITH NOCTURIA: ICD-10-CM

## 2025-04-30 DIAGNOSIS — E04.1 THYROID NODULE: ICD-10-CM

## 2025-04-30 DIAGNOSIS — E55.9 VITAMIN D DEFICIENCY, UNSPECIFIED: ICD-10-CM

## 2025-04-30 DIAGNOSIS — R35.1 BENIGN PROSTATIC HYPERPLASIA WITH NOCTURIA: ICD-10-CM

## 2025-04-30 DIAGNOSIS — I10 PRIMARY HYPERTENSION: ICD-10-CM

## 2025-04-30 DIAGNOSIS — E78.5 HYPERLIPIDEMIA, UNSPECIFIED HYPERLIPIDEMIA TYPE: ICD-10-CM

## 2025-04-30 DIAGNOSIS — R97.20 ELEVATED PSA: ICD-10-CM

## 2025-04-30 LAB
25(OH)D3 SERPL-MCNC: 67.8 NG/ML (ref 30–100)
ALBUMIN SERPL-MCNC: 4.7 G/DL (ref 3.5–5.2)
ALBUMIN/GLOB SERPL: 1.7 G/DL
ALP SERPL-CCNC: 90 U/L (ref 39–117)
ALT SERPL W P-5'-P-CCNC: 26 U/L (ref 1–41)
ANION GAP SERPL CALCULATED.3IONS-SCNC: 9.7 MMOL/L (ref 5–15)
AST SERPL-CCNC: 33 U/L (ref 1–40)
BASOPHILS # BLD AUTO: 0.03 10*3/MM3 (ref 0–0.2)
BASOPHILS NFR BLD AUTO: 0.5 % (ref 0–1.5)
BILIRUB SERPL-MCNC: 0.7 MG/DL (ref 0–1.2)
BUN SERPL-MCNC: 18 MG/DL (ref 8–23)
BUN/CREAT SERPL: 14.9 (ref 7–25)
CALCIUM SPEC-SCNC: 9.9 MG/DL (ref 8.6–10.5)
CHLORIDE SERPL-SCNC: 102 MMOL/L (ref 98–107)
CHOLEST SERPL-MCNC: 131 MG/DL (ref 0–200)
CO2 SERPL-SCNC: 27.3 MMOL/L (ref 22–29)
CREAT SERPL-MCNC: 1.21 MG/DL (ref 0.76–1.27)
DEPRECATED RDW RBC AUTO: 41.8 FL (ref 37–54)
EGFRCR SERPLBLD CKD-EPI 2021: 65.6 ML/MIN/1.73
EOSINOPHIL # BLD AUTO: 0.1 10*3/MM3 (ref 0–0.4)
EOSINOPHIL NFR BLD AUTO: 1.8 % (ref 0.3–6.2)
ERYTHROCYTE [DISTWIDTH] IN BLOOD BY AUTOMATED COUNT: 13.3 % (ref 12.3–15.4)
GLOBULIN UR ELPH-MCNC: 2.8 GM/DL
GLUCOSE SERPL-MCNC: 89 MG/DL (ref 65–99)
HBA1C MFR BLD: 5.7 % (ref 4.8–5.6)
HCT VFR BLD AUTO: 48.8 % (ref 37.5–51)
HDLC SERPL-MCNC: 50 MG/DL (ref 40–60)
HGB BLD-MCNC: 16 G/DL (ref 13–17.7)
IMM GRANULOCYTES # BLD AUTO: 0.01 10*3/MM3 (ref 0–0.05)
IMM GRANULOCYTES NFR BLD AUTO: 0.2 % (ref 0–0.5)
LDLC SERPL CALC-MCNC: 64 MG/DL (ref 0–100)
LDLC/HDLC SERPL: 1.26 {RATIO}
LYMPHOCYTES # BLD AUTO: 2.19 10*3/MM3 (ref 0.7–3.1)
LYMPHOCYTES NFR BLD AUTO: 39.7 % (ref 19.6–45.3)
MCH RBC QN AUTO: 28.3 PG (ref 26.6–33)
MCHC RBC AUTO-ENTMCNC: 32.8 G/DL (ref 31.5–35.7)
MCV RBC AUTO: 86.4 FL (ref 79–97)
MONOCYTES # BLD AUTO: 0.47 10*3/MM3 (ref 0.1–0.9)
MONOCYTES NFR BLD AUTO: 8.5 % (ref 5–12)
NEUTROPHILS NFR BLD AUTO: 2.72 10*3/MM3 (ref 1.7–7)
NEUTROPHILS NFR BLD AUTO: 49.3 % (ref 42.7–76)
NRBC BLD AUTO-RTO: 0 /100 WBC (ref 0–0.2)
PLATELET # BLD AUTO: 223 10*3/MM3 (ref 140–450)
PMV BLD AUTO: 10 FL (ref 6–12)
POTASSIUM SERPL-SCNC: 4.1 MMOL/L (ref 3.5–5.2)
PROT SERPL-MCNC: 7.5 G/DL (ref 6–8.5)
PSA SERPL-MCNC: 1.38 NG/ML (ref 0–4)
RBC # BLD AUTO: 5.65 10*6/MM3 (ref 4.14–5.8)
SODIUM SERPL-SCNC: 139 MMOL/L (ref 136–145)
T4 FREE SERPL-MCNC: 1.03 NG/DL (ref 0.92–1.68)
TESTOST SERPL-MCNC: 396 NG/DL (ref 193–740)
TRIGL SERPL-MCNC: 89 MG/DL (ref 0–150)
TSH SERPL DL<=0.05 MIU/L-ACNC: 1.06 UIU/ML (ref 0.27–4.2)
VIT B12 BLD-MCNC: 900 PG/ML (ref 211–946)
VLDLC SERPL-MCNC: 17 MG/DL (ref 5–40)
WBC NRBC COR # BLD AUTO: 5.52 10*3/MM3 (ref 3.4–10.8)

## 2025-04-30 PROCEDURE — 84443 ASSAY THYROID STIM HORMONE: CPT | Performed by: FAMILY MEDICINE

## 2025-04-30 PROCEDURE — 85025 COMPLETE CBC W/AUTO DIFF WBC: CPT | Performed by: FAMILY MEDICINE

## 2025-04-30 PROCEDURE — 84153 ASSAY OF PSA TOTAL: CPT | Performed by: FAMILY MEDICINE

## 2025-04-30 PROCEDURE — 36415 COLL VENOUS BLD VENIPUNCTURE: CPT | Performed by: FAMILY MEDICINE

## 2025-04-30 PROCEDURE — 80061 LIPID PANEL: CPT | Performed by: FAMILY MEDICINE

## 2025-04-30 PROCEDURE — 83036 HEMOGLOBIN GLYCOSYLATED A1C: CPT | Performed by: FAMILY MEDICINE

## 2025-04-30 PROCEDURE — 84403 ASSAY OF TOTAL TESTOSTERONE: CPT | Performed by: FAMILY MEDICINE

## 2025-04-30 PROCEDURE — 80053 COMPREHEN METABOLIC PANEL: CPT | Performed by: FAMILY MEDICINE

## 2025-04-30 PROCEDURE — 82607 VITAMIN B-12: CPT | Performed by: FAMILY MEDICINE

## 2025-04-30 PROCEDURE — 84439 ASSAY OF FREE THYROXINE: CPT | Performed by: FAMILY MEDICINE

## 2025-04-30 PROCEDURE — 82306 VITAMIN D 25 HYDROXY: CPT | Performed by: FAMILY MEDICINE

## 2025-04-30 RX ORDER — IPRATROPIUM BROMIDE AND ALBUTEROL SULFATE 2.5; .5 MG/3ML; MG/3ML
SOLUTION RESPIRATORY (INHALATION)
COMMUNITY
Start: 2025-02-27

## 2025-04-30 NOTE — PROGRESS NOTES
Subjective   The ABCs of the Annual Wellness Visit  Medicare Wellness Visit    Nehemias Strong Jr. is a 69 y.o. patient who presents for a Medicare Wellness Visit.    The following portions of the patient's history were reviewed and   updated as appropriate: allergies, current medications, past family history, past medical history, past social history, past surgical history, and problem list.    Compared to one year ago, the patient's physical   health is the same.  Compared to one year ago, the patient's mental   health is the same.    Recent Hospitalizations:  This patient has not had a [facility] admission record on file within the last 365 days.    Current Medical Providers:  Patient Care Team:  Jona Vieira MD as PCP - General (Internal Medicine)  Alejandro Smith MD as Consulting Physician (Cardiac Electrophysiology)  Luis Carlos Travis MD as Consulting Physician (Urology)  Robe Gaviria MD as Consulting Physician (Gastroenterology)    Outpatient Medications Prior to Visit   Medication Sig Dispense Refill    aspirin 81 MG chewable tablet Chew 1 tablet Daily. Every Other Day      atorvastatin (LIPITOR) 20 MG tablet Take 1 tablet by mouth Daily.      Berberine Chloride (BERBERINE HCI PO) Take  by mouth.      Continuous Glucose Sensor (FreeStyle Iraj 3 Sensor) misc USE TO CHECK BLOOD SUGAR FOUR TIMES DAILY AS NEEDED      DHEA 25 MG capsule Take 25 mg by mouth Daily.      ipratropium-albuterol (DUO-NEB) 0.5-2.5 mg/3 ml nebulizer USE 1 VIAL VIA NEBULIZER EVERY 6 HOURS AS NEEDED FOR SHORTNESS OF BREATH      losartan (COZAAR) 25 MG tablet TAKE 1 TABLET BY MOUTH DAILY 90 tablet 1    multivitamin with minerals (PHYTOMULTI PO) Take 1 tablet by mouth Daily.      NON FORMULARY GI-Resolve 6.7 oz powder      Sodium Chloride-Sodium Bicarb (AYR SALINE NASAL RINSE NA) into the nostril(s) as directed by provider.      Cholecalciferol 10 MCG (400 UNIT) tablet 0      Multiple Vitamin  (MULTIVITAMIN) capsule Take 1 capsule by mouth Daily. Stop 9-23-22 (Patient not taking: Reported on 4/30/2025)      NON FORMULARY Metagenics Concentrated Ultra Prostagen (Patient not taking: Reported on 4/30/2025)      Pregnenolone 50 MG tablet Take 30 mg by mouth Daily. (Patient not taking: Reported on 4/30/2025)      testosterone (ANDROGEL) 50 MG/5GM (1%) gel gel Place 50 mg on the skin as directed by provider Daily. Compounded with chrysin (Patient not taking: Reported on 4/30/2025)       No facility-administered medications prior to visit.     No opioid medication identified on active medication list. I have reviewed chart for other potential  high risk medication/s and harmful drug interactions in the elderly.      Aspirin is on active medication list. Aspirin use is indicated based on review of current medical condition/s. Pros and cons of this therapy have been discussed today. Benefits of this medication outweigh potential harm.  Patient has been encouraged to continue taking this medication.  .    Patient Active Problem List   Diagnosis    Vitamin D deficiency    Migraine headache    Kidney stones    Idiopathic cyst of iris, ciliary body, or anterior chamber    Hyperlipidemia    Gastroesophageal reflux disease    Benign prostatic hyperplasia with nocturia    Bradycardia    Cardiac arrhythmia    Allergic rhinitis    Benign neoplasm of skin of eyelid    Family history of lung cancer    Other specified diseases of anus and rectum    Odynophagia    Plantar fasciitis    Rectal prolapse    Thyroid nodule    Primary hypertension    Lipoma    Obstructive lung disease (generalized)    Clinical diagnosis of severe acute respiratory syndrome coronavirus 2 (SARS-CoV-2) disease    Dvrtclos of lg int w/o perforation or abscess w/o bleeding    Coronary artery calcification    Dysphagia     Advance Care Planning Advance Directive is on file.  ACP discussion was held with the patient during this visit. Patient has an  "advance directive in EMR which is still valid.       Objective   Vitals:    25 1028   BP: 122/74   Pulse: 52   Resp: 20   SpO2: 98%   Weight: 99.2 kg (218 lb 9.6 oz)   Height: 185.4 cm (73\")   PainSc: 4    PainLoc: Back     Estimated body mass index is 28.84 kg/m² as calculated from the following:    Height as of this encounter: 185.4 cm (73\").    Weight as of this encounter: 99.2 kg (218 lb 9.6 oz).    BMI is >= 25 and <30. (Overweight) The following options were offered after discussion;: weight loss educational material (shared in after visit summary)     Does the patient have evidence of cognitive impairment? No                                                                                             Health  Risk Assessment    Smoking Status:  Social History     Tobacco Use   Smoking Status Never    Passive exposure: Never   Smokeless Tobacco Never   Tobacco Comments    None     Alcohol Consumption:  Social History     Substance and Sexual Activity   Alcohol Use No     Fall Risk Screen  STEADI Fall Risk Assessment was completed, and patient is at LOW risk for falls.Assessment completed on:2025    Depression Screening   Little interest or pleasure in doing things? Not at all   Feeling down, depressed, or hopeless? Not at all   PHQ-2 Total Score 0      Health Habits and Functional and Cognitive Screenin/24/2025    10:54 PM   Functional & Cognitive Status   Do you have difficulty preparing food and eating? No   Do you have difficulty bathing yourself, getting dressed or grooming yourself? No   Do you have difficulty using the toilet? No   Do you have difficulty moving around from place to place? No   Do you have trouble with steps or getting out of a bed or a chair? No   Current Diet Well Balanced Diet   Dental Exam Up to date   Eye Exam Up to date   Exercise (times per week) 4 times per week   Current Exercises Include Aerobics;Cardiovascular Workout;Home Exercise Program (TV, Computer, " Etc.);Pilates;Yard Work   Do you need help using the phone?  No   Are you deaf or do you have serious difficulty hearing?  No   Do you need help to go to places out of walking distance? No   Do you need help shopping? No   Do you need help preparing meals?  No   Do you need help with housework?  No   Do you need help with laundry? No   Do you need help taking your medications? No   Do you need help managing money? No   Do you ever drive or ride in a car without wearing a seat belt? No   Have you felt unusual stress, anger or loneliness in the last month? No   Who do you live with? Spouse   If you need help, do you have trouble finding someone available to you? No   Have you been bothered in the last four weeks by sexual problems? No   Do you have difficulty concentrating, remembering or making decisions? No           Age-appropriate Screening Schedule:  Refer to the list below for future screening recommendations based on patient's age, sex and/or medical conditions. Orders for these recommended tests are listed in the plan section. The patient has been provided with a written plan.    Health Maintenance List  Health Maintenance   Topic Date Due    LIPID PANEL  04/24/2025    INFLUENZA VACCINE  07/01/2025    ANNUAL WELLNESS VISIT  04/30/2026    TDAP/TD VACCINES (2 - Td or Tdap) 09/07/2026    COLORECTAL CANCER SCREENING  03/22/2028    HEPATITIS C SCREENING  Completed    Pneumococcal Vaccine 50+  Completed    ZOSTER VACCINE  Completed    COVID-19 Vaccine  Discontinued                                                                                                                                              CMS Preventative Services Quick Reference  Risk Factors Identified During Encounter  Immunizations Discussed/Encouraged: Influenza    The above risks/problems have been discussed with the patient.  Pertinent information has been shared with the patient in the After Visit Summary.  An After Visit Summary and PPPS were  "made available to the patient.    Preventative:  Colonoscopy: 3/2023, due 3/2030  PSA: 8.8 (7/2024), follows w/ urology- [facility]  Shingles: Zostavax 5/2018, Shingrix 2/2020  Pneumonia: PCV20 7/2023  Tdap: 9/2016  RSV: 5/2024  Influenza: 9/2024, recommended  COVID: Completed 3 shots Pfizer (10/2021)    Follow Up:   Next Medicare Wellness visit to be scheduled in 1 year.     Additional E&M Note during same encounter follows:  Patient has additional, significant, and separately identifiable condition(s)/problem(s) that require work above and beyond the Medicare Wellness Visit     Chief Complaint  Medicare Wellness-subsequent, Diabetes, and Leg Injury (Discoloration left chin)    Subjective   HPI  Darrell is also being seen today for additional medical problem/s.        HTN: 122/74 today. Maintained on losartan 25 daily. No LH/dizziness, CP, or SOB.      HLD: maintained on atorvastatin 20mg daily. No concerns      Thyroid nodule: Incidental finding and work-up of hemoptysis-as seen on 2/2020 CT chest. R-sided, 1.7cm in size. 5/2020 US revealed 2.4cm mixed cystic and solid nodule. This is followed by ENT (Dr PRATHER) who has performed FNA and is following w/ serial US. 12/2023 CT chest w/ 1.5cm thyroid nodule- stable. Further appeared stable on 2/2025 CT report. Does report some discomfort swallowing and throat clearing.      BPH: patient reports 1 episode of nocturia nightly. Not interested in medication.      Elevated PSA: PSA bumped to 11 last year. Testosterone supplement was discontinued and 7/2024 PSA down to 8.8. Denies changes in urinary habits    Cough now resolved. Does have some throat clearing but largely has improved/resolved on his own. Perhaps prolonged symptom of COVID    Objective   Vital Signs:  /74   Pulse 52   Resp 20   Ht 185.4 cm (73\")   Wt 99.2 kg (218 lb 9.6 oz)   SpO2 98%   BMI 28.84 kg/m²   Physical Exam  Constitutional:       General: He is not in acute distress.     Appearance: He is " well-developed.   HENT:      Head: Normocephalic and atraumatic.      Right Ear: Tympanic membrane and external ear normal. There is no impacted cerumen.      Left Ear: Tympanic membrane and external ear normal. There is no impacted cerumen.      Nose: Nose normal.      Mouth/Throat:      Mouth: Mucous membranes are moist.      Pharynx: No oropharyngeal exudate or posterior oropharyngeal erythema.   Eyes:      General: No scleral icterus.        Right eye: No discharge.         Left eye: No discharge.      Extraocular Movements: Extraocular movements intact.      Conjunctiva/sclera: Conjunctivae normal.      Pupils: Pupils are equal, round, and reactive to light.   Neck:      Thyroid: No thyromegaly.      Vascular: No carotid bruit.   Cardiovascular:      Rate and Rhythm: Normal rate and regular rhythm.      Heart sounds: Normal heart sounds. No murmur heard.  Pulmonary:      Effort: Pulmonary effort is normal. No respiratory distress.      Breath sounds: Normal breath sounds. No wheezing or rales.   Abdominal:      General: Bowel sounds are normal. There is no distension.      Palpations: Abdomen is soft.      Tenderness: There is no abdominal tenderness.   Musculoskeletal:         General: No deformity. Normal range of motion.      Cervical back: Normal range of motion and neck supple.   Lymphadenopathy:      Cervical: No cervical adenopathy.   Skin:     General: Skin is warm and dry.      Findings: No rash.   Neurological:      General: No focal deficit present.      Mental Status: He is alert and oriented to person, place, and time. Mental status is at baseline.      Cranial Nerves: No cranial nerve deficit.      Sensory: No sensory deficit.   Psychiatric:         Behavior: Behavior normal.         Thought Content: Thought content normal.        Assessment and Plan Additional age appropriate preventative wellness advice topics were discussed during today's preventative wellness exam(some topics already addressed  during AWV portion of the note above):    Physical Activity: Advised cardiovascular activity 150 minutes per week as tolerated. (example brisk walk for 30 minutes, 5 days a week).     Medicare annual wellness visit, subsequent  - Counseled regarding diet, exercise, weight loss, and preventative health maintenance items/immunizations below  Orders:    CBC & Differential    Comprehensive Metabolic Panel    Hemoglobin A1c    Lipid Panel    TSH    T4, Free    Vitamin D,25-Hydroxy    Vitamin B12    Testosterone    Primary hypertension  122/74 today  - Cont home losartan 25 daily  Hyperlipidemia, unspecified hyperlipidemia type  - Cont home atorvastatin 20mg daily  Orders:    Lipid Panel    Thyroid nodule  Stable on repeat imaging. Reportedly normal FNA previously  - Cont ENT f/u- Dr PRATHER  Benign prostatic hyperplasia with nocturia  - Monitor  Elevated PSA  Perhaps 2/2 testosterone supplementation, which has now been discontinued. Most recently trending down to 8.8 from 11  Orders:    PSA DIAGNOSTIC    Vitamin D deficiency, unspecified  Orders:    Vitamin D,25-Hydroxy            Follow Up   Return in about 1 year (around 4/30/2026) for Medicare Wellness.  Patient was given instructions and counseling regarding his condition or for health maintenance advice. Please see specific information pulled into the AVS if appropriate.

## 2025-05-01 ENCOUNTER — OFFICE VISIT (OUTPATIENT)
Dept: FAMILY MEDICINE CLINIC | Facility: CLINIC | Age: 70
End: 2025-05-01
Payer: MEDICARE

## 2025-05-01 ENCOUNTER — CLINICAL SUPPORT (OUTPATIENT)
Dept: FAMILY MEDICINE CLINIC | Facility: CLINIC | Age: 70
End: 2025-05-01
Payer: MEDICARE

## 2025-05-01 VITALS
TEMPERATURE: 97.2 F | BODY MASS INDEX: 28.68 KG/M2 | WEIGHT: 216.4 LBS | SYSTOLIC BLOOD PRESSURE: 161 MMHG | OXYGEN SATURATION: 95 % | HEIGHT: 73 IN | RESPIRATION RATE: 15 BRPM | HEART RATE: 69 BPM | DIASTOLIC BLOOD PRESSURE: 84 MMHG

## 2025-05-01 VITALS — DIASTOLIC BLOOD PRESSURE: 84 MMHG | SYSTOLIC BLOOD PRESSURE: 124 MMHG

## 2025-05-01 DIAGNOSIS — I10 PRIMARY HYPERTENSION: Primary | ICD-10-CM

## 2025-05-01 DIAGNOSIS — I25.10 CORONARY ARTERY CALCIFICATION: ICD-10-CM

## 2025-05-01 DIAGNOSIS — J44.9 OBSTRUCTIVE LUNG DISEASE (GENERALIZED): ICD-10-CM

## 2025-05-01 DIAGNOSIS — R35.1 BENIGN PROSTATIC HYPERPLASIA WITH NOCTURIA: ICD-10-CM

## 2025-05-01 DIAGNOSIS — N20.0 KIDNEY STONES: ICD-10-CM

## 2025-05-01 DIAGNOSIS — R00.1 BRADYCARDIA: ICD-10-CM

## 2025-05-01 DIAGNOSIS — N40.1 BENIGN PROSTATIC HYPERPLASIA WITH NOCTURIA: ICD-10-CM

## 2025-05-01 DIAGNOSIS — R73.03 PRE-DIABETES: ICD-10-CM

## 2025-05-01 PROCEDURE — 1126F AMNT PAIN NOTED NONE PRSNT: CPT | Performed by: FAMILY MEDICINE

## 2025-05-01 PROCEDURE — 3079F DIAST BP 80-89 MM HG: CPT | Performed by: FAMILY MEDICINE

## 2025-05-01 PROCEDURE — 99214 OFFICE O/P EST MOD 30 MIN: CPT | Performed by: FAMILY MEDICINE

## 2025-05-01 PROCEDURE — 3077F SYST BP >= 140 MM HG: CPT | Performed by: FAMILY MEDICINE

## 2025-05-01 NOTE — ASSESSMENT & PLAN NOTE
Poor control on Cozaar 25 mg daily; he tolerates this well without side effects but I am concerned that he may need more drugs encouraged to watch salt, exercise more and lose weight.  Advised patient to see Dr. Vieira.  He was given a CACI form for Dr. Vieira to fill out.

## 2025-05-01 NOTE — ASSESSMENT & PLAN NOTE
Hgb A1c on 5-6-25 was 5.7.  Advised patient to see Dr. Vieira.  Also a CACI form for Pre-diabetes was given to patient.

## 2025-05-01 NOTE — PROGRESS NOTES
Subjective   Nehemias Strong Jr. is a 69 y.o. male.   No chief complaint on file.    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    Past Medical History:   Diagnosis Date    Bradycardia 08/16/2019    Followed by cardiologist with holter monitor every 6 months.    Cardiac arrhythmia 08/16/2019    COVID 05/2022    Gastroesophageal reflux disease 01/14/2019    History of kidney stones     Hyperlipidemia 08/16/2019    Hypertension 2017    Losartan    Kidney stone     follows w/ [facility]    Onychomycosis     follow w/ podiatry- Dr Stock    Rectal prolapse 05/01/2018    Sleep apnea     Cpap    Thyroid nodule     Dr PRATHER    Umbilical hernia 09/2022       Past Surgical History:   Procedure Laterality Date    BRONCHOSCOPY N/A 09/21/2023    Procedure: BRONCHOSCOPY with left lower lobe bronchioalveolar lavage;  Surgeon: Jasper Bobo MD;  Location: [facility] ENDOSCOPY;  Service: Pulmonary;  Laterality: N/A;    COLONOSCOPY      DENTAL PROCEDURE      FINGER SURGERY  03/2022    cyst removal    HERNIA REPAIR Right     inguinal w/ Lilia    MASS EXCISION N/A 01/12/2021    Procedure: lipoma excisions to abdomen, bilateral upper lateral thighs and bialteral arms;  Surgeon: Williams Stafford DO;  Location: [facility] MAIN OR;  Service: General;  Laterality: N/A;    THYROID SURGERY      TOTAL HIP ARTHROPLASTY Left 11/13/2024    UMBILICAL HERNIA REPAIR N/A 09/30/2022    Procedure: UMBILICAL HERNIA REPAIR with mesh;  Surgeon: Robe Torres MD;  Location: [facility] MAIN OR;  Service: General;  Laterality: N/A;    US GUIDED FINE NEEDLE ASPIRATION      Dr PRATHER        Family History   Problem Relation Age of Onset    Lung cancer  Mother         large cell    Cancer Mother         Large cell melanoma from lung cancer, smoking related. , 3/26/94    No Known Problems Father     Birth defects Brother         hole between left and right ventricles at birth.  at age 6 in , 3 days after experimental surgery.    No Known Problems Maternal Grandmother     No Known Problems Maternal Grandfather     No Known Problems Paternal Grandmother     No Known Problems Paternal Grandfather     No Known Problems Sister         Social History     Socioeconomic History    Marital status:     Number of children: 7   Tobacco Use    Smoking status: Never     Passive exposure: Never    Smokeless tobacco: Never    Tobacco comments:     None   Vaping Use    Vaping status: Never Used   Substance and Sexual Activity    Alcohol use: No    Drug use: Never    Sexual activity: Yes     Partners: Female     Birth control/protection: Condom       Outpatient Medications Prior to Visit   Medication Sig Dispense Refill    aspirin 81 MG chewable tablet Chew 1 tablet Daily. Every Other Day      atorvastatin (LIPITOR) 20 MG tablet Take 1 tablet by mouth Daily.      Berberine Chloride (BERBERINE HCI PO) Take  by mouth.      Continuous Glucose Sensor (FreeStyle Iraj 3 Sensor) misc USE TO CHECK BLOOD SUGAR FOUR TIMES DAILY AS NEEDED      DHEA 25 MG capsule Take 25 mg by mouth Daily.      ipratropium-albuterol (DUO-NEB) 0.5-2.5 mg/3 ml nebulizer USE 1 VIAL VIA NEBULIZER EVERY 6 HOURS AS NEEDED FOR SHORTNESS OF BREATH      losartan (COZAAR) 25 MG tablet TAKE 1 TABLET BY MOUTH DAILY 90 tablet 1    multivitamin with minerals (PHYTOMULTI PO) Take 1 tablet by mouth Daily.      NON FORMULARY GI-Resolve 6.7 oz powder      Sodium Chloride-Sodium Bicarb (AYR SALINE NASAL RINSE NA) into the nostril(s) as directed by provider.       No facility-administered medications prior to visit.        Review of Systems    Objective   Visit Vitals  /84 (BP Location: Left arm,  "Patient Position: Sitting, Cuff Size: Adult)   Pulse 69   Temp 97.2 °F (36.2 °C)   Resp 15   Ht 185.4 cm (73\")   Wt 98.2 kg (216 lb 6.4 oz)   SpO2 95%   BMI 28.55 kg/m²     Physical Exam  Vitals and nursing note reviewed.   Constitutional:       Appearance: He is well-developed.   HENT:      Head: Normocephalic.   Neck:      Thyroid: No thyromegaly.      Vascular: No carotid bruit.      Trachea: Trachea normal.   Cardiovascular:      Rate and Rhythm: Normal rate and regular rhythm.      Heart sounds: No murmur heard.     No friction rub. No gallop.   Pulmonary:      Effort: Pulmonary effort is normal. No respiratory distress.      Breath sounds: Normal breath sounds. No wheezing.   Chest:      Chest wall: No tenderness.   Musculoskeletal:      Cervical back: Neck supple.   Skin:     General: Skin is dry.      Findings: No rash.      Nails: There is no clubbing.   Neurological:      Mental Status: He is alert and oriented to person, place, and time.   Psychiatric:         Behavior: Behavior is cooperative.         Assessment & Plan   Problem List Items Addressed This Visit          Unprioritized    Benign prostatic hyperplasia with nocturia    Current Assessment & Plan   Patient is followed by Dr. Travis.  Patient will request for record to be sent to us.          Bradycardia    Overview   Followed by cardiologist with holter monitor every 6 months.         Coronary artery calcification    Current Assessment & Plan   He has been follow-up with cardiologist         Kidney stones    Overview   Lab kidney stone was approximately 2017.  Resolved at this time. Patient was seen by Urologist, Dr. Travis and KLEBER and US  faxed to [employer].         Obstructive lung disease (generalized)    Current Assessment & Plan   Doing well with DuoNeb         Pre-diabetes    Current Assessment & Plan   Hgb A1c on 5-6-25 was 5.7.  Advised patient to see Dr. Vieira.  Also a CACI form for Pre-diabetes was given to patient.          Primary " hypertension - Primary    Current Assessment & Plan   Poor control on Cozaar 25 mg daily; he tolerates this well without side effects but I am concerned that he may need more drugs encouraged to watch salt, exercise more and lose weight.  Advised patient to see Dr. Vieira.  He was given a CACI form for Dr. Vieira to fill out.

## 2025-05-05 ENCOUNTER — TELEPHONE (OUTPATIENT)
Dept: FAMILY MEDICINE CLINIC | Facility: CLINIC | Age: 70
End: 2025-05-05
Payer: MEDICARE

## 2025-05-05 NOTE — TELEPHONE ENCOUNTER
Spoke to patient per Dr Sweet that could be easier for the patient. He stated he would just go and see his primary care for this.

## 2025-05-05 NOTE — TELEPHONE ENCOUNTER
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  Please call him at [phone]

## 2025-05-06 ENCOUNTER — OFFICE VISIT (OUTPATIENT)
Dept: FAMILY MEDICINE CLINIC | Facility: CLINIC | Age: 70
End: 2025-05-06
Payer: MEDICARE

## 2025-05-06 VITALS
OXYGEN SATURATION: 98 % | DIASTOLIC BLOOD PRESSURE: 78 MMHG | RESPIRATION RATE: 18 BRPM | HEIGHT: 73 IN | HEART RATE: 58 BPM | BODY MASS INDEX: 28.57 KG/M2 | WEIGHT: 215.6 LBS | SYSTOLIC BLOOD PRESSURE: 136 MMHG

## 2025-05-06 DIAGNOSIS — Z00.00 PHYSICAL EXAM: Primary | ICD-10-CM

## 2025-05-06 NOTE — PROGRESS NOTES
Chief Complaint   Patient presents with    paperwork     HPI  Nehemias Strong Jr. is a 69 y.o. 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 No complaints    Review of Systems  Pertinent positives of ROS documented in HPI    The following portions of the patient's history were reviewed and updated as appropriate: problem list, past medical history, past surgical history, allergies, current medication    Problem List Tab  Patient History Tab  Immunizations Tab  Medications Tab  Chart Review Tab  Care Everywhere Tab  Synopsis Tab    PE  Vitals:    05/06/25 0818   BP: 136/78   Pulse: 58   Resp: 18   SpO2: 98%     Body mass index is 28.44 kg/m².  General: Well nourished, NAD  Head: AT/NC  Eyes: EOMI, anicteric sclera  ENT: MMM w/o erythema. TM clear bilaterally  Neck: Supple, no thyromegaly or LAD  Resp: CTAB, SCR, BS equal  CV: RRR w/o m/r/g; 2+ pulses  GI: Soft, NT/ND, +BS  MSK: FROM, no deformity, no edema  Skin: Warm, dry, intact. Scar to L forearm. Discolored, thickened toenails bilaterally  Neuro: Alert and oriented. No focal deficits  Psych: Appropriate mood and affect    Imaging  CT Chest Without Contrast Diagnostic  Result Date: 2/28/2025  Impression: 1. No acute intrathoracic process. 2. No findings of chronic interstitial lung disease. 3. Coronary atherosclerotic calcifications most pronounced in the LAD. 4. Additional chronic findings above. Electronically Signed: Osman Boggs MD  2/28/2025 7:36 AM EST  Workstation ID: [id number]    Assessment & Plan   Nehemias Strong Jr. is a 69 y.o. male that presents for   Chief Complaint   Patient presents with    paperwork     Diagnoses and all orders for this visit:    1. Physical exam (Primary)   - FAA paperwork and physical exam completed completed       Return if symptoms worsen or fail to improve.  20 minutes spent on the day of service face-to-face with the  patient obtaining history, performing physical, reviewing chart/data, placing orders, and documenting.

## 2025-08-10 ENCOUNTER — HOSPITAL ENCOUNTER (OUTPATIENT)
Facility: HOSPITAL | Age: 70
Discharge: HOME | End: 2025-08-10
Attending: EMERGENCY MEDICINE | Admitting: EMERGENCY MEDICINE
Payer: MEDICARE

## 2025-08-10 VITALS
WEIGHT: 205.3 LBS | HEART RATE: 47 BPM | OXYGEN SATURATION: 98 % | HEIGHT: 73 IN | SYSTOLIC BLOOD PRESSURE: 136 MMHG | RESPIRATION RATE: 16 BRPM | DIASTOLIC BLOOD PRESSURE: 69 MMHG | TEMPERATURE: 97.9 F | BODY MASS INDEX: 27.21 KG/M2

## 2025-08-10 DIAGNOSIS — L25.9 CONTACT DERMATITIS, UNSPECIFIED CONTACT DERMATITIS TYPE, UNSPECIFIED TRIGGER: Primary | ICD-10-CM

## 2025-08-10 PROCEDURE — 25010000002 METHYLPREDNISOLONE PER 125 MG

## 2025-08-10 PROCEDURE — G0463 HOSPITAL OUTPT CLINIC VISIT: HCPCS

## 2025-08-10 RX ORDER — METHYLPREDNISOLONE 4 MG/1
TABLET ORAL
Qty: 21 TABLET | Refills: 0 | Status: SHIPPED | OUTPATIENT
Start: 2025-08-10

## 2025-08-10 RX ORDER — METHYLPREDNISOLONE SODIUM SUCCINATE 125 MG/2ML
80 INJECTION, POWDER, LYOPHILIZED, FOR SOLUTION INTRAMUSCULAR; INTRAVENOUS ONCE
Status: COMPLETED | OUTPATIENT
Start: 2025-08-10 | End: 2025-08-10

## 2025-08-10 RX ADMIN — METHYLPREDNISOLONE SODIUM SUCCINATE 80 MG: 125 INJECTION, POWDER, FOR SOLUTION INTRAMUSCULAR; INTRAVENOUS at 14:05

## 2025-08-29 DIAGNOSIS — I10 PRIMARY HYPERTENSION: ICD-10-CM

## 2025-08-29 RX ORDER — LOSARTAN POTASSIUM 25 MG/1
25 TABLET ORAL DAILY
Qty: 90 TABLET | Refills: 1 | Status: SHIPPED | OUTPATIENT
Start: 2025-08-29

## (undated) DEVICE — PROXIMATE RH ROTATING HEAD SKIN STAPLERS (35 WIDE) CONTAINS 35 STAINLESS STEEL STAPLES: Brand: PROXIMATE

## (undated) DEVICE — GLV SURG BIOGEL LTX PF 7 1/2

## (undated) DEVICE — KT SURG TURNOVER 050

## (undated) DEVICE — UNDERGLV SURG BIOGEL INDICAT PF 8 GRN

## (undated) DEVICE — SLV SCD CALF HEMOFORCE DVT THERP REPROC MD

## (undated) DEVICE — DRSNG SURESITE WNDW 4X4.5

## (undated) DEVICE — TBG PENCL TELESCP MEGADYNE SMOKE EVAC 15FT

## (undated) DEVICE — DRSNG WND BORDR/ADHS NONADHR/GZ LF 4X4IN STRL

## (undated) DEVICE — 3M™ STERI-STRIP™ REINFORCED ADHESIVE SKIN CLOSURES, R1547, 1/2 IN X 4 IN (12 MM X 100 MM), 6 STRIPS/ENVELOPE: Brand: 3M™ STERI-STRIP™

## (undated) DEVICE — SOL IRR NACL 0.9PCT 1000ML

## (undated) DEVICE — SUT VIC 2/0 SH 27IN

## (undated) DEVICE — SPNG GZ WOVN 4X4IN 12PLY 10/BX STRL

## (undated) DEVICE — SUT VIC DEXON PRE 4 4/0 18IN J496G

## (undated) DEVICE — BAPTIST FLOYD BRONCHOSCOPY: Brand: MEDLINE INDUSTRIES, INC.

## (undated) DEVICE — ELECTRD BLD EZ CLN MOD XLNG 2.75IN

## (undated) DEVICE — ADHS LIQ MASTISOL 2/3ML

## (undated) DEVICE — SUT VIC 3/0 SH 27IN J416H

## (undated) DEVICE — DRAPE SHEET ULTRAGARD: Brand: MEDLINE

## (undated) DEVICE — SUT VIC FS2 4/0 27IN J422H

## (undated) DEVICE — DECANTER: Brand: UNBRANDED

## (undated) DEVICE — CUFF SCD HEMOFORCE SEQ CALF STD MD

## (undated) DEVICE — PK MINOR LAPAROTOMY 50

## (undated) DEVICE — UNDYED BRAIDED (POLYGLACTIN 910), SYNTHETIC ABSORBABLE SUTURE: Brand: COATED VICRYL

## (undated) DEVICE — GOWN,REINFORCE,POLY,SIRUS,BREATH SLV,XLG: Brand: MEDLINE

## (undated) DEVICE — GLV SURG BIOGEL SENSR LTX PF SZ7.5

## (undated) DEVICE — ELECTRD NDL MEGADYNE EZCLEAN NOSE 7CM

## (undated) DEVICE — SOL IRRIG H2O 1000ML STRL

## (undated) DEVICE — TOWEL,OR,DSP,ST,WHITE,DLX,XR,4/PK,20PK/C: Brand: MEDLINE

## (undated) DEVICE — SUT ETHIB 0/0 MO6 I8IN CX45D